# Patient Record
Sex: MALE | Race: BLACK OR AFRICAN AMERICAN | NOT HISPANIC OR LATINO | URBAN - METROPOLITAN AREA
[De-identification: names, ages, dates, MRNs, and addresses within clinical notes are randomized per-mention and may not be internally consistent; named-entity substitution may affect disease eponyms.]

---

## 2017-01-04 ENCOUNTER — OUTPATIENT (OUTPATIENT)
Dept: OUTPATIENT SERVICES | Facility: HOSPITAL | Age: 82
LOS: 1 days | Discharge: HOME | End: 2017-01-04

## 2017-01-04 ENCOUNTER — APPOINTMENT (OUTPATIENT)
Dept: HEMATOLOGY ONCOLOGY | Facility: CLINIC | Age: 82
End: 2017-01-04

## 2017-01-04 VITALS
DIASTOLIC BLOOD PRESSURE: 75 MMHG | RESPIRATION RATE: 12 BRPM | TEMPERATURE: 97 F | HEART RATE: 57 BPM | SYSTOLIC BLOOD PRESSURE: 146 MMHG | WEIGHT: 158 LBS

## 2017-01-04 DIAGNOSIS — R39.9 UNSPECIFIED SYMPTOMS AND SIGNS INVOLVING THE GENITOURINARY SYSTEM: ICD-10-CM

## 2017-01-04 RX ORDER — TAMSULOSIN HYDROCHLORIDE 0.4 MG/1
0.4 CAPSULE ORAL
Qty: 90 | Refills: 3 | Status: ACTIVE | COMMUNITY
Start: 2017-01-04 | End: 1900-01-01

## 2017-01-05 LAB — PSA FLD-MCNC: 1.73 NG/ML

## 2017-06-27 DIAGNOSIS — C61 MALIGNANT NEOPLASM OF PROSTATE: ICD-10-CM

## 2017-07-12 ENCOUNTER — OUTPATIENT (OUTPATIENT)
Dept: OUTPATIENT SERVICES | Facility: HOSPITAL | Age: 82
LOS: 1 days | Discharge: HOME | End: 2017-07-12

## 2017-07-12 ENCOUNTER — APPOINTMENT (OUTPATIENT)
Dept: HEMATOLOGY ONCOLOGY | Facility: CLINIC | Age: 82
End: 2017-07-12

## 2017-07-12 VITALS
DIASTOLIC BLOOD PRESSURE: 69 MMHG | RESPIRATION RATE: 12 BRPM | SYSTOLIC BLOOD PRESSURE: 119 MMHG | BODY MASS INDEX: 20.3 KG/M2 | WEIGHT: 145 LBS | HEIGHT: 71 IN | TEMPERATURE: 97.6 F

## 2017-07-17 DIAGNOSIS — C61 MALIGNANT NEOPLASM OF PROSTATE: ICD-10-CM

## 2017-07-17 LAB — PSA FREE MFR FLD: 2.6 NG/ML

## 2018-01-10 ENCOUNTER — APPOINTMENT (OUTPATIENT)
Dept: HEMATOLOGY ONCOLOGY | Facility: CLINIC | Age: 83
End: 2018-01-10
Payer: MEDICARE

## 2018-01-10 ENCOUNTER — OUTPATIENT (OUTPATIENT)
Dept: OUTPATIENT SERVICES | Facility: HOSPITAL | Age: 83
LOS: 1 days | Discharge: HOME | End: 2018-01-10

## 2018-01-10 VITALS
HEIGHT: 71 IN | RESPIRATION RATE: 12 BRPM | HEART RATE: 60 BPM | SYSTOLIC BLOOD PRESSURE: 117 MMHG | TEMPERATURE: 96.7 F | BODY MASS INDEX: 21 KG/M2 | WEIGHT: 150 LBS | DIASTOLIC BLOOD PRESSURE: 65 MMHG

## 2018-01-10 PROCEDURE — 99213 OFFICE O/P EST LOW 20 MIN: CPT

## 2018-01-11 DIAGNOSIS — C61 MALIGNANT NEOPLASM OF PROSTATE: ICD-10-CM

## 2018-01-11 LAB — PSA FREE MFR FLD: 2.96 NG/ML

## 2018-02-28 ENCOUNTER — MEDICATION RENEWAL (OUTPATIENT)
Age: 83
End: 2018-02-28

## 2018-05-16 ENCOUNTER — OUTPATIENT (OUTPATIENT)
Dept: OUTPATIENT SERVICES | Facility: HOSPITAL | Age: 83
LOS: 1 days | Discharge: HOME | End: 2018-05-16

## 2018-05-16 ENCOUNTER — APPOINTMENT (OUTPATIENT)
Dept: HEMATOLOGY ONCOLOGY | Facility: CLINIC | Age: 83
End: 2018-05-16
Payer: MEDICARE

## 2018-05-16 VITALS
DIASTOLIC BLOOD PRESSURE: 70 MMHG | RESPIRATION RATE: 12 BRPM | BODY MASS INDEX: 21.84 KG/M2 | TEMPERATURE: 96 F | HEART RATE: 55 BPM | SYSTOLIC BLOOD PRESSURE: 144 MMHG | HEIGHT: 71 IN | WEIGHT: 156 LBS

## 2018-05-17 LAB — PSA SERPL-MCNC: 4.5 NG/ML

## 2018-05-18 DIAGNOSIS — C61 MALIGNANT NEOPLASM OF PROSTATE: ICD-10-CM

## 2018-06-04 ENCOUNTER — LABORATORY RESULT (OUTPATIENT)
Age: 83
End: 2018-06-04

## 2018-06-04 ENCOUNTER — OUTPATIENT (OUTPATIENT)
Dept: OUTPATIENT SERVICES | Facility: HOSPITAL | Age: 83
LOS: 1 days | Discharge: HOME | End: 2018-06-04

## 2018-06-04 ENCOUNTER — APPOINTMENT (OUTPATIENT)
Dept: HEMATOLOGY ONCOLOGY | Facility: CLINIC | Age: 83
End: 2018-06-04

## 2018-06-04 DIAGNOSIS — Z87.81 PRESENCE OF OTHER BONE AND TENDON IMPLANTS: ICD-10-CM

## 2018-06-04 DIAGNOSIS — Z86.79 PERSONAL HISTORY OF OTHER DISEASES OF THE CIRCULATORY SYSTEM: ICD-10-CM

## 2018-06-04 DIAGNOSIS — Z96.7 PRESENCE OF OTHER BONE AND TENDON IMPLANTS: ICD-10-CM

## 2018-06-04 DIAGNOSIS — Z87.438 PERSONAL HISTORY OF OTHER DISEASES OF MALE GENITAL ORGANS: ICD-10-CM

## 2018-06-04 LAB
HCT VFR BLD CALC: 36.1 %
HGB BLD-MCNC: 12 G/DL
MCHC RBC-ENTMCNC: 29.8 PG
MCHC RBC-ENTMCNC: 33.2 G/DL
MCV RBC AUTO: 89.6 FL
PLATELET # BLD AUTO: 299 K/UL
PMV BLD: 9.1 FL
RBC # BLD: 4.03 M/UL
RBC # FLD: 13.9 %
WBC # FLD AUTO: 5.15 K/UL

## 2018-06-04 RX ORDER — ATORVASTATIN CALCIUM 20 MG/1
20 TABLET, FILM COATED ORAL
Refills: 0 | Status: ACTIVE | COMMUNITY

## 2018-06-04 RX ORDER — CHROMIUM 200 MCG
1000 TABLET ORAL
Refills: 0 | Status: ACTIVE | COMMUNITY

## 2018-06-04 RX ORDER — HYDROCHLOROTHIAZIDE 12.5 MG/1
12.5 CAPSULE ORAL
Refills: 0 | Status: ACTIVE | COMMUNITY

## 2018-06-04 RX ORDER — AMLODIPINE BESYLATE 10 MG/1
10 TABLET ORAL
Refills: 0 | Status: ACTIVE | COMMUNITY

## 2018-06-06 LAB
ALBUMIN SERPL ELPH-MCNC: 4.4 G/DL
ALP BLD-CCNC: 59 U/L
ALT SERPL-CCNC: 17 U/L
ANION GAP SERPL CALC-SCNC: 18 MMOL/L
AST SERPL-CCNC: 27 U/L
BILIRUB SERPL-MCNC: 0.3 MG/DL
BUN SERPL-MCNC: 22 MG/DL
CALCIUM SERPL-MCNC: 10 MG/DL
CHLORIDE SERPL-SCNC: 102 MMOL/L
CO2 SERPL-SCNC: 25 MMOL/L
CREAT SERPL-MCNC: 1.2 MG/DL
GLUCOSE SERPL-MCNC: 107 MG/DL
POTASSIUM SERPL-SCNC: 3.6 MMOL/L
PROT SERPL-MCNC: 7.5 G/DL
SODIUM SERPL-SCNC: 145 MMOL/L

## 2018-06-06 NOTE — REVIEW OF SYSTEMS
[Joint Pain] : joint pain [Muscle Pain] : muscle pain [Negative] : Neurological [Fatigue] : no fatigue [FreeTextEntry3] : Hearing loss

## 2018-06-06 NOTE — PHYSICAL EXAM
[Restricted in physically strenuous activity but ambulatory and able to carry out work of a light or sedentary nature] : Status 1- Restricted in physically strenuous activity but ambulatory and able to carry out work of a light or sedentary nature, e.g., light house work, office work [Thin] : thin [Normal] : grossly intact [de-identified] : Hearing aids

## 2018-06-06 NOTE — HISTORY OF PRESENT ILLNESS
[de-identified] : CC: I have pain in my hips\par \par This is a 86 year old male who presents for an initial evaluation for prostate cancer at the request of Dr. Barnes. He was diagnosed with prostate cancer in 2011 and was treated with external beam radiation and brachytherapy . In 1/2013  he had biochemical recurrence with PSA  as high as 111 and he has been on   Trelstar  since than.  Imaging at that time just showed pulmonary nodules. They do not know the [place] score.\par \par CT chest 1/2013: 5.5 mm pulmonary nodule new in medial right, stable 4.8 mm nodule anterior right lobe, 10 mm groudnglass nodule,.other benign noduels seen \par CT abdo/pelvis: liver and renal cysts.\par Bone scan 2/2013: No bony mets\par \par His PSA has been increasing and he is here to see me.\par \par 05- is most recent 4.5 1.43 is from  8/2015\par ?? 4.50 ? 2.96 ? 2.60 ? 1.730 ? 1.59 ? 1.20 ? 1.43 \par  HIs PSA doubiling time is PSA Doubling Time\par \par MSK base on PSA \par Doubling Time 19.4 months \par [place] Log (PSA) 0.0 \par Velocity 0.1 ng/mL/mo \par \par Years\par \par Doubling Time 1.6 years \par [place] Log (PSA) 0.4 \par Velocity 1.0 ng/mL/yr \par \par \par His main complaints are pain in his hips and discomfort in thighs on ambulation. He remains active

## 2018-06-06 NOTE — REASON FOR VISIT
[Initial Consultation] : an initial consultation [Spouse] : spouse [FreeTextEntry2] : Prostate Cancer

## 2018-06-06 NOTE — ASSESSMENT
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 If he has mets will start him on abiraterone with prednisone\par -will check CBC, CMP as well\par -he is on D instructed him to start Calcium\par -if no bony mets on scans will get DEXA\par -RTC in 3 weeks \par -information on  apalutimide and abiraterone provided

## 2018-06-06 NOTE — CONSULT LETTER
[Dear  ___] : Dear  [unfilled], [Consult Letter:] : I had the pleasure of evaluating your patient, [unfilled]. [Please see my note below.] : Please see my note below. [Sincerely,] : Sincerely, [FreeTextEntry3] : Nathaniel

## 2018-06-07 DIAGNOSIS — C61 MALIGNANT NEOPLASM OF PROSTATE: ICD-10-CM

## 2018-06-10 ENCOUNTER — FORM ENCOUNTER (OUTPATIENT)
Age: 83
End: 2018-06-10

## 2018-06-11 ENCOUNTER — OUTPATIENT (OUTPATIENT)
Dept: OUTPATIENT SERVICES | Facility: HOSPITAL | Age: 83
LOS: 1 days | Discharge: HOME | End: 2018-06-11

## 2018-06-11 DIAGNOSIS — C61 MALIGNANT NEOPLASM OF PROSTATE: ICD-10-CM

## 2018-06-25 ENCOUNTER — FORM ENCOUNTER (OUTPATIENT)
Age: 83
End: 2018-06-25

## 2018-06-26 ENCOUNTER — OUTPATIENT (OUTPATIENT)
Dept: OUTPATIENT SERVICES | Facility: HOSPITAL | Age: 83
LOS: 1 days | Discharge: HOME | End: 2018-06-26

## 2018-06-26 DIAGNOSIS — C61 MALIGNANT NEOPLASM OF PROSTATE: ICD-10-CM

## 2018-07-10 ENCOUNTER — APPOINTMENT (OUTPATIENT)
Dept: HEMATOLOGY ONCOLOGY | Facility: CLINIC | Age: 83
End: 2018-07-10

## 2018-10-08 ENCOUNTER — APPOINTMENT (OUTPATIENT)
Dept: UROLOGY | Facility: CLINIC | Age: 83
End: 2018-10-08
Payer: MEDICARE

## 2018-10-08 VITALS
WEIGHT: 156 LBS | HEART RATE: 65 BPM | DIASTOLIC BLOOD PRESSURE: 62 MMHG | SYSTOLIC BLOOD PRESSURE: 97 MMHG | HEIGHT: 71 IN | BODY MASS INDEX: 21.84 KG/M2

## 2018-10-08 RX ORDER — CHROMIUM 200 MCG
1000 TABLET ORAL
Refills: 0 | Status: ACTIVE | COMMUNITY

## 2018-10-17 ENCOUNTER — APPOINTMENT (OUTPATIENT)
Dept: HEMATOLOGY ONCOLOGY | Facility: CLINIC | Age: 83
End: 2018-10-17

## 2018-10-17 ENCOUNTER — LABORATORY RESULT (OUTPATIENT)
Age: 83
End: 2018-10-17

## 2018-10-17 ENCOUNTER — OUTPATIENT (OUTPATIENT)
Dept: OUTPATIENT SERVICES | Facility: HOSPITAL | Age: 83
LOS: 1 days | Discharge: HOME | End: 2018-10-17

## 2018-10-17 VITALS
DIASTOLIC BLOOD PRESSURE: 63 MMHG | RESPIRATION RATE: 12 BRPM | SYSTOLIC BLOOD PRESSURE: 146 MMHG | TEMPERATURE: 96.6 F | BODY MASS INDEX: 21.84 KG/M2 | WEIGHT: 156 LBS | HEIGHT: 71 IN | HEART RATE: 64 BPM

## 2018-10-17 DIAGNOSIS — C61 MALIGNANT NEOPLASM OF PROSTATE: ICD-10-CM

## 2018-10-17 RX ORDER — PNV NO.95/FERROUS FUM/FOLIC AC 28MG-0.8MG
500-400 TABLET ORAL
Qty: 120 | Refills: 3 | Status: ACTIVE | COMMUNITY
Start: 2018-10-17 | End: 1900-01-01

## 2018-10-17 RX ORDER — PREDNISONE 5 MG/1
5 TABLET ORAL DAILY
Qty: 90 | Refills: 3 | Status: ACTIVE | COMMUNITY
Start: 2018-10-17 | End: 1900-01-01

## 2018-10-17 RX ORDER — ABIRATERONE ACETATE 500 MG/1
500 TABLET, FILM COATED ORAL DAILY
Qty: 60 | Refills: 6 | Status: ACTIVE | COMMUNITY
Start: 2018-10-17 | End: 1900-01-01

## 2018-10-18 LAB
25(OH)D3 SERPL-MCNC: 76 NG/ML
ALBUMIN SERPL ELPH-MCNC: 4.7 G/DL
ALP BLD-CCNC: 61 U/L
ALT SERPL-CCNC: 20 U/L
ANION GAP SERPL CALC-SCNC: 16 MMOL/L
AST SERPL-CCNC: 26 U/L
BILIRUB SERPL-MCNC: 0.3 MG/DL
BUN SERPL-MCNC: 14 MG/DL
CALCIUM SERPL-MCNC: 9.5 MG/DL
CALCIUM SERPL-MCNC: 9.8 MG/DL
CHLORIDE SERPL-SCNC: 103 MMOL/L
CO2 SERPL-SCNC: 26 MMOL/L
CREAT SERPL-MCNC: 1.2 MG/DL
GLUCOSE SERPL-MCNC: 99 MG/DL
HCT VFR BLD CALC: 36 %
HGB BLD-MCNC: 11.8 G/DL
MCHC RBC-ENTMCNC: 30.2 PG
MCHC RBC-ENTMCNC: 32.8 G/DL
MCV RBC AUTO: 92.1 FL
PARATHYROID HORMONE INTACT: 33 PG/ML
PLATELET # BLD AUTO: 262 K/UL
PMV BLD: 8.7 FL
POTASSIUM SERPL-SCNC: 4.4 MMOL/L
PROT SERPL-MCNC: 7.5 G/DL
PSA SERPL-MCNC: 11.72 NG/ML
RBC # BLD: 3.91 M/UL
RBC # FLD: 15 %
SODIUM SERPL-SCNC: 145 MMOL/L
TESTOST SERPL-MCNC: 22.2 NG/DL
WBC # FLD AUTO: 5.69 K/UL

## 2018-10-19 NOTE — HISTORY OF PRESENT ILLNESS
[de-identified] : CC: I have pain in my hips\par \par This is a 86 year old male who presents for an initial evaluation for prostate cancer at the request of Dr. Barnes. He was diagnosed with prostate cancer in 2011 and was treated with external beam radiation and brachytherapy . In 1/2013  he had biochemical recurrence with PSA  as high as 111 and he has been on   Trelstar  since than.  Imaging at that time just showed pulmonary nodules. They do not know the [place] score.\par \par CT chest 1/2013: 5.5 mm pulmonary nodule new in medial right, stable 4.8 mm nodule anterior right lobe, 10 mm groudnglass nodule,.other benign noduels seen \par CT abdo/pelvis: liver and renal cysts.\par Bone scan 2/2013: No bony mets\par \par His PSA has been increasing and he is here to see me.\par \par 05- is most recent 4.5 1.43 is from  8/2015\par ?? 4.50 ? 2.96 ? 2.60 ? 1.730 ? 1.59 ? 1.20 ? 1.43 \par  HIs PSA doubiling time is PSA Doubling Time\par \par MSK base on PSA \par Doubling Time 19.4 months \par [place] Log (PSA) 0.0 \par Velocity 0.1 ng/mL/mo \par \par Years\par \par Doubling Time 1.6 years \par [place] Log (PSA) 0.4 \par Velocity 1.0 ng/mL/yr \par \par \par His main complaints are pain in his hips and discomfort in thighs on ambulation. He remains active  [de-identified] : 10/17/18\par He returns for follow up, several months later. He imaging in 6/2018 that showed Bone scan: 2 definite new bony abnormalities (right  side of T12 and left fifth posterior rib) and probably a new bony  abnormality (C5 or C6) suspicious for metastatic bone disease by pattern  of distribution and change over time. CT chest: Since November 2006.   Multiple blastic lesions, T2, T12 and left fifth rib, consistent with  osseous metastases (positive on recent bone scan).  Additional 5 mm sclerotic lesion, T6 vertebral body.   New approximately 11 mm groundglass nodule, right apex (series 3/87) Follow-up CT scan in 3 months time to assess stability suggested.  Stable partially calcified bilateral hilar and lower right paratracheal  lymph nodes., Likely result of previous granulomatous infection. CTa bdomen: New blastic metastatic lesion in the T12 vertebral body as identified on  nuclear medicine bone scan dated June 11, 2018  No evidence of abdominal or pelvic adenopathy.

## 2018-10-19 NOTE — ASSESSMENT
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 I could offer him Prov[facility] but they need to travel  so will start him on abiraterone with prednisone 5 mg daily they are aware of side effect we discussed last time and we did again today and litirature provided\par -on DT wit Dr Barnes \par -will check CBC, CMP as PSA \par -he is on D and Calcium\par -he has no teeth and given bone mets will start Xgeva, risk  discussed including osteonecrosis of jaw and wishes to start with next visit , will check PTH and Vitamin D \par -RTC in 4 weeks

## 2018-10-19 NOTE — REVIEW OF SYSTEMS
[Joint Pain] : joint pain [Muscle Pain] : muscle pain [Fatigue] : no fatigue [Negative] : Psychiatric [FreeTextEntry3] : Hearing loss

## 2018-10-19 NOTE — PHYSICAL EXAM
[Restricted in physically strenuous activity but ambulatory and able to carry out work of a light or sedentary nature] : Status 1- Restricted in physically strenuous activity but ambulatory and able to carry out work of a light or sedentary nature, e.g., light house work, office work [Thin] : thin [Normal] : grossly intact [de-identified] : Hearing aids

## 2018-10-22 DIAGNOSIS — C61 MALIGNANT NEOPLASM OF PROSTATE: ICD-10-CM

## 2018-11-14 ENCOUNTER — APPOINTMENT (OUTPATIENT)
Dept: HEMATOLOGY ONCOLOGY | Facility: CLINIC | Age: 83
End: 2018-11-14

## 2018-11-14 ENCOUNTER — APPOINTMENT (OUTPATIENT)
Dept: INFUSION THERAPY | Facility: CLINIC | Age: 83
End: 2018-11-14

## 2018-11-20 ENCOUNTER — APPOINTMENT (OUTPATIENT)
Dept: HEMATOLOGY ONCOLOGY | Facility: CLINIC | Age: 83
End: 2018-11-20

## 2019-01-14 ENCOUNTER — APPOINTMENT (OUTPATIENT)
Dept: UROLOGY | Facility: CLINIC | Age: 84
End: 2019-01-14

## 2019-02-05 ENCOUNTER — OUTPATIENT (OUTPATIENT)
Dept: OUTPATIENT SERVICES | Facility: HOSPITAL | Age: 84
LOS: 1 days | Discharge: HOME | End: 2019-02-05

## 2019-02-05 DIAGNOSIS — C61 MALIGNANT NEOPLASM OF PROSTATE: ICD-10-CM

## 2019-04-01 ENCOUNTER — APPOINTMENT (OUTPATIENT)
Dept: UROLOGY | Facility: CLINIC | Age: 84
End: 2019-04-01
Payer: MEDICARE

## 2019-04-01 VITALS — BODY MASS INDEX: 21.84 KG/M2 | WEIGHT: 156 LBS | HEIGHT: 71 IN

## 2019-04-01 DIAGNOSIS — C61 MALIGNANT NEOPLASM OF PROSTATE: ICD-10-CM

## 2019-04-03 RX ORDER — TAMSULOSIN HYDROCHLORIDE 0.4 MG/1
0.4 CAPSULE ORAL
Qty: 90 | Refills: 3 | Status: ACTIVE | COMMUNITY
Start: 2018-02-28 | End: 1900-01-01

## 2019-07-01 ENCOUNTER — APPOINTMENT (OUTPATIENT)
Dept: UROLOGY | Facility: CLINIC | Age: 84
End: 2019-07-01

## 2019-07-26 ENCOUNTER — INPATIENT (INPATIENT)
Facility: HOSPITAL | Age: 84
LOS: 0 days | Discharge: LEFT BEFORE DISCHARGE | End: 2019-07-26
Attending: INTERNAL MEDICINE | Admitting: INTERNAL MEDICINE
Payer: MEDICARE

## 2019-07-26 VITALS
SYSTOLIC BLOOD PRESSURE: 139 MMHG | RESPIRATION RATE: 18 BRPM | OXYGEN SATURATION: 97 % | TEMPERATURE: 97 F | DIASTOLIC BLOOD PRESSURE: 68 MMHG | HEART RATE: 86 BPM

## 2019-07-26 VITALS
RESPIRATION RATE: 18 BRPM | TEMPERATURE: 97 F | OXYGEN SATURATION: 100 % | DIASTOLIC BLOOD PRESSURE: 51 MMHG | HEART RATE: 57 BPM | SYSTOLIC BLOOD PRESSURE: 94 MMHG

## 2019-07-26 LAB
ALBUMIN SERPL ELPH-MCNC: 3.4 G/DL — LOW (ref 3.5–5.2)
ALP SERPL-CCNC: 79 U/L — SIGNIFICANT CHANGE UP (ref 30–115)
ALT FLD-CCNC: 31 U/L — SIGNIFICANT CHANGE UP (ref 0–41)
ANION GAP SERPL CALC-SCNC: 12 MMOL/L — SIGNIFICANT CHANGE UP (ref 7–14)
APPEARANCE UR: ABNORMAL
APTT BLD: 41.9 SEC — HIGH (ref 27–39.2)
AST SERPL-CCNC: 47 U/L — HIGH (ref 0–41)
BACTERIA # UR AUTO: ABNORMAL
BASE EXCESS BLDV CALC-SCNC: 15.1 MMOL/L — HIGH (ref -2–2)
BASOPHILS # BLD AUTO: 0.03 K/UL — SIGNIFICANT CHANGE UP (ref 0–0.2)
BASOPHILS NFR BLD AUTO: 0.4 % — SIGNIFICANT CHANGE UP (ref 0–1)
BILIRUB SERPL-MCNC: 0.5 MG/DL — SIGNIFICANT CHANGE UP (ref 0.2–1.2)
BILIRUB UR-MCNC: NEGATIVE — SIGNIFICANT CHANGE UP
BUN SERPL-MCNC: 14 MG/DL — SIGNIFICANT CHANGE UP (ref 10–20)
CA-I SERPL-SCNC: 1.12 MMOL/L — SIGNIFICANT CHANGE UP (ref 1.12–1.3)
CALCIUM SERPL-MCNC: 8.9 MG/DL — SIGNIFICANT CHANGE UP (ref 8.5–10.1)
CHLORIDE SERPL-SCNC: 101 MMOL/L — SIGNIFICANT CHANGE UP (ref 98–110)
CO2 SERPL-SCNC: 31 MMOL/L — SIGNIFICANT CHANGE UP (ref 17–32)
COLOR SPEC: YELLOW — SIGNIFICANT CHANGE UP
CREAT SERPL-MCNC: 1.1 MG/DL — SIGNIFICANT CHANGE UP (ref 0.7–1.5)
DIFF PNL FLD: SIGNIFICANT CHANGE UP
EOSINOPHIL # BLD AUTO: 0.1 K/UL — SIGNIFICANT CHANGE UP (ref 0–0.7)
EOSINOPHIL NFR BLD AUTO: 1.2 % — SIGNIFICANT CHANGE UP (ref 0–8)
EPI CELLS # UR: SIGNIFICANT CHANGE UP /HPF (ref 0–5)
GAS PNL BLDV: 141 MMOL/L — SIGNIFICANT CHANGE UP (ref 136–145)
GAS PNL BLDV: SIGNIFICANT CHANGE UP
GLUCOSE SERPL-MCNC: 96 MG/DL — SIGNIFICANT CHANGE UP (ref 70–99)
GLUCOSE UR QL: NEGATIVE — SIGNIFICANT CHANGE UP
HCO3 BLDV-SCNC: 40 MMOL/L — HIGH (ref 22–29)
HCT VFR BLD CALC: 29.8 % — LOW (ref 42–52)
HCT VFR BLDA CALC: 33.6 % — LOW (ref 34–44)
HGB BLD CALC-MCNC: 11 G/DL — LOW (ref 14–18)
HGB BLD-MCNC: 10.4 G/DL — LOW (ref 14–18)
IMM GRANULOCYTES NFR BLD AUTO: 0.2 % — SIGNIFICANT CHANGE UP (ref 0.1–0.3)
INR BLD: 1.06 RATIO — SIGNIFICANT CHANGE UP (ref 0.65–1.3)
KETONES UR-MCNC: ABNORMAL
LACTATE BLDV-MCNC: 1.3 MMOL/L — SIGNIFICANT CHANGE UP (ref 0.5–1.6)
LEUKOCYTE ESTERASE UR-ACNC: NEGATIVE — SIGNIFICANT CHANGE UP
LYMPHOCYTES # BLD AUTO: 2.22 K/UL — SIGNIFICANT CHANGE UP (ref 1.2–3.4)
LYMPHOCYTES # BLD AUTO: 27.4 % — SIGNIFICANT CHANGE UP (ref 20.5–51.1)
MAGNESIUM SERPL-MCNC: 2.2 MG/DL — SIGNIFICANT CHANGE UP (ref 1.8–2.4)
MCHC RBC-ENTMCNC: 30.6 PG — SIGNIFICANT CHANGE UP (ref 27–31)
MCHC RBC-ENTMCNC: 34.9 G/DL — SIGNIFICANT CHANGE UP (ref 32–37)
MCV RBC AUTO: 87.6 FL — SIGNIFICANT CHANGE UP (ref 80–94)
MONOCYTES # BLD AUTO: 0.75 K/UL — HIGH (ref 0.1–0.6)
MONOCYTES NFR BLD AUTO: 9.3 % — SIGNIFICANT CHANGE UP (ref 1.7–9.3)
NEUTROPHILS # BLD AUTO: 4.98 K/UL — SIGNIFICANT CHANGE UP (ref 1.4–6.5)
NEUTROPHILS NFR BLD AUTO: 61.5 % — SIGNIFICANT CHANGE UP (ref 42.2–75.2)
NITRITE UR-MCNC: NEGATIVE — SIGNIFICANT CHANGE UP
NRBC # BLD: 0 /100 WBCS — SIGNIFICANT CHANGE UP (ref 0–0)
PCO2 BLDV: 48 MMHG — SIGNIFICANT CHANGE UP (ref 41–51)
PH BLDV: 7.53 — HIGH (ref 7.26–7.43)
PH UR: 7 — SIGNIFICANT CHANGE UP (ref 5–8)
PLATELET # BLD AUTO: 290 K/UL — SIGNIFICANT CHANGE UP (ref 130–400)
PO2 BLDV: 28 MMHG — SIGNIFICANT CHANGE UP (ref 20–40)
POTASSIUM BLDV-SCNC: 3 MMOL/L — LOW (ref 3.3–5.6)
POTASSIUM SERPL-MCNC: 2.5 MMOL/L — CRITICAL LOW (ref 3.5–5)
POTASSIUM SERPL-SCNC: 2.5 MMOL/L — CRITICAL LOW (ref 3.5–5)
PROT SERPL-MCNC: 6.5 G/DL — SIGNIFICANT CHANGE UP (ref 6–8)
PROT UR-MCNC: ABNORMAL
PROTHROM AB SERPL-ACNC: 12.2 SEC — SIGNIFICANT CHANGE UP (ref 9.95–12.87)
RBC # BLD: 3.4 M/UL — LOW (ref 4.7–6.1)
RBC # FLD: 14.5 % — SIGNIFICANT CHANGE UP (ref 11.5–14.5)
RBC CASTS # UR COMP ASSIST: SIGNIFICANT CHANGE UP /HPF (ref 0–4)
SAO2 % BLDV: 53 % — SIGNIFICANT CHANGE UP
SODIUM SERPL-SCNC: 144 MMOL/L — SIGNIFICANT CHANGE UP (ref 135–146)
SP GR SPEC: 1.02 — SIGNIFICANT CHANGE UP (ref 1.01–1.02)
UROBILINOGEN FLD QL: ABNORMAL
WBC # BLD: 8.1 K/UL — SIGNIFICANT CHANGE UP (ref 4.8–10.8)
WBC # FLD AUTO: 8.1 K/UL — SIGNIFICANT CHANGE UP (ref 4.8–10.8)
WBC UR QL: SIGNIFICANT CHANGE UP /HPF (ref 0–5)

## 2019-07-26 PROCEDURE — 99285 EMERGENCY DEPT VISIT HI MDM: CPT

## 2019-07-26 PROCEDURE — 93010 ELECTROCARDIOGRAM REPORT: CPT

## 2019-07-26 PROCEDURE — 71045 X-RAY EXAM CHEST 1 VIEW: CPT | Mod: 26

## 2019-07-26 RX ORDER — TAMSULOSIN HYDROCHLORIDE 0.4 MG/1
0.4 CAPSULE ORAL AT BEDTIME
Refills: 0 | Status: DISCONTINUED | OUTPATIENT
Start: 2019-07-26 | End: 2019-07-26

## 2019-07-26 RX ORDER — ATORVASTATIN CALCIUM 80 MG/1
1 TABLET, FILM COATED ORAL
Qty: 0 | Refills: 0 | DISCHARGE

## 2019-07-26 RX ORDER — ATORVASTATIN CALCIUM 80 MG/1
20 TABLET, FILM COATED ORAL AT BEDTIME
Refills: 0 | Status: DISCONTINUED | OUTPATIENT
Start: 2019-07-26 | End: 2019-07-26

## 2019-07-26 RX ORDER — POTASSIUM CHLORIDE 20 MEQ
40 PACKET (EA) ORAL
Qty: 40 | Refills: 0
Start: 2019-07-26 | End: 2019-07-30

## 2019-07-26 RX ORDER — TAMSULOSIN HYDROCHLORIDE 0.4 MG/1
1 CAPSULE ORAL
Qty: 0 | Refills: 0 | DISCHARGE

## 2019-07-26 RX ORDER — POTASSIUM CHLORIDE 20 MEQ
20 PACKET (EA) ORAL ONCE
Refills: 0 | Status: COMPLETED | OUTPATIENT
Start: 2019-07-26 | End: 2019-07-26

## 2019-07-26 RX ORDER — AMLODIPINE BESYLATE 2.5 MG/1
1 TABLET ORAL
Qty: 0 | Refills: 0 | DISCHARGE

## 2019-07-26 RX ORDER — POTASSIUM CHLORIDE 20 MEQ
40 PACKET (EA) ORAL ONCE
Refills: 0 | Status: COMPLETED | OUTPATIENT
Start: 2019-07-26 | End: 2019-07-26

## 2019-07-26 RX ADMIN — Medication 20 MILLIEQUIVALENT(S): at 15:29

## 2019-07-26 RX ADMIN — Medication 40 MILLIEQUIVALENT(S): at 17:17

## 2019-07-26 RX ADMIN — Medication 50 MILLIEQUIVALENT(S): at 15:29

## 2019-07-26 NOTE — ED PROVIDER NOTE - PHYSICAL EXAMINATION
VITAL SIGNS: I have reviewed nursing notes and confirm.  CONSTITUTIONAL: Well-developed; well-nourished; in no acute distress.  SKIN: Skin exam is warm and dry, no acute rash.  HEAD: Normocephalic; atraumatic.  EYES: PERRL, EOM intact; conjunctiva and sclera clear. ?exophthalmos  ENT: No nasal discharge; airway clear. TMs clear. dry mm  NECK: Supple; non tender.  CARD: S1, S2 normal; no murmurs, gallops, or rubs. Regular rate and rhythm.  RESP: No wheezes, rales or rhonchi.  ABD: Normal bowel sounds; soft; non-distended; non-tender;  EXT: Normal ROM. No clubbing, cyanosis , +pitting edema. 2+  NEURO: aox3, cn2-12 grossly normal, 5/5 strength all ext, sensation intact,  PSYCH: Cooperative, appropriate.

## 2019-07-26 NOTE — CHART NOTE - NSCHARTNOTEFT_GEN_A_CORE
Before admitting the patient - the patient stated that he wants to leave AMA - patient has capacity and his son is at bedside   he understands the risks of arrythmia and death with the low K that he has and that he needs assistance but he insisted that he wants to leave   he has no active or passive suicidal thoughts Before admitting the patient - the patient stated that he wants to leave AMA - patient has capacity and his son is at bedside   he understands the risks of arrythmia and death with the low K that he has and that he needs assistance but he insisted that he wants to leave   he has no active or passive suicidal thoughts  left a message for Dr Vernon group   he signed an AMA form as well as his son - I sent potassium to his pharmacy and advised urgently to come back if any symptoms and to repeat BMP tomorrow

## 2019-07-26 NOTE — ED ADULT NURSE NOTE - OBJECTIVE STATEMENT
Patient with PMH of hHTN, stage 4 prostate ca (onc at [facility] dr. freddie saxena- on zytiga, HLD, sent in by Dr. Vernon for hypokalemia.  as per son, pt has been unable to care for himself since his wife was hospitalized about a month ago.  pt was seen by Dr. Saxena 7/24 and labs showed k of 2.1.  pt was prescribed kdur and son thinks pt may have taken a pill.  yesterday, pt was at dr. vernon's office and labs drawn.  son was called today to say that his k is still low (doesn't know value), and to go to ED.  as per son, pt lives alone now and the house appeared unkempt.  pt only complains of weakness x 1 month and b/l leg swelling.  pt appears to only be on HCTZ for diuretic

## 2019-07-26 NOTE — ED PROVIDER NOTE - NS ED ROS FT
Review of Systems:  	•	CONSTITUTIONAL - no fever, no diaphoresis, no weight change, weakness  	•	SKIN - no rash  	•	HEMATOLOGIC - no bleeding, no bruising  	•	EYES - no eye pain, no blurred vision  	•	ENT - no change in hearing, no pain  	•	RESPIRATORY - no shortness of breath, no cough  	•	CARDIAC - no chest pain, no palpitations  	•	GI - no abd pain, no nausea, no vomiting, no diarrhea, no constipation, no bleeding  	•	 - no dysuria, no hematuria, no flank pain, no urinary retention  	•	MUSCULOSKELETAL - no joint paint, no redness, +leg swelling  	•	NEUROLOGIC - no focal weakness, no headache, no paresthesias, no dizziness  All other systems negative, unless specified in HPI

## 2019-07-26 NOTE — ED PROVIDER NOTE - CLINICAL SUMMARY MEDICAL DECISION MAKING FREE TEXT BOX
Pt with hypokalemia, appears difficulty living at home by himself since wife , son concerned about his safety at home, will admit to tele

## 2019-07-26 NOTE — ED PROVIDER NOTE - OBJECTIVE STATEMENT
86 yo m with pmh of htn, stage 4 prostate ca (onc at [facility] dr. freddie pal- on zytiga, and "injections"), hld, sent in by dr. negrete for hypokalemia.  as per son, pt has been unable to care for himself since his wife was hospitalized about a month ago.  pt was seen by dr. pal 7/24 and labs showed k of 2.1.  pt was prescribed kdur (?mg) and son thinks pt may have taken a pill.  yesterday, pt was at dr. negrete's office and labs drawn.  son was called today to say that his k is still low (doesn't know value), and to go to ED.  as per son, pt lives alone now and the house appeared unkempt.  pt only complains of weakness x 1 month and b/l leg swelling.  pt appears to only be on HCTZ for diuretic

## 2019-08-01 DIAGNOSIS — I10 ESSENTIAL (PRIMARY) HYPERTENSION: ICD-10-CM

## 2019-08-01 DIAGNOSIS — C61 MALIGNANT NEOPLASM OF PROSTATE: ICD-10-CM

## 2019-08-01 DIAGNOSIS — E78.5 HYPERLIPIDEMIA, UNSPECIFIED: ICD-10-CM

## 2019-08-01 DIAGNOSIS — E87.6 HYPOKALEMIA: ICD-10-CM

## 2019-09-27 ENCOUNTER — EMERGENCY (EMERGENCY)
Facility: HOSPITAL | Age: 84
LOS: 0 days | Discharge: HOME | End: 2019-09-27
Attending: EMERGENCY MEDICINE | Admitting: EMERGENCY MEDICINE
Payer: MEDICARE

## 2019-09-27 VITALS
HEART RATE: 60 BPM | SYSTOLIC BLOOD PRESSURE: 137 MMHG | OXYGEN SATURATION: 100 % | TEMPERATURE: 97 F | DIASTOLIC BLOOD PRESSURE: 65 MMHG | RESPIRATION RATE: 18 BRPM

## 2019-09-27 DIAGNOSIS — R41.0 DISORIENTATION, UNSPECIFIED: ICD-10-CM

## 2019-09-27 DIAGNOSIS — R79.9 ABNORMAL FINDING OF BLOOD CHEMISTRY, UNSPECIFIED: ICD-10-CM

## 2019-09-27 LAB
ALBUMIN SERPL ELPH-MCNC: 4.5 G/DL — SIGNIFICANT CHANGE UP (ref 3.5–5.2)
ALP SERPL-CCNC: 45 U/L — SIGNIFICANT CHANGE UP (ref 30–115)
ALT FLD-CCNC: 11 U/L — SIGNIFICANT CHANGE UP (ref 0–41)
ANION GAP SERPL CALC-SCNC: 12 MMOL/L — SIGNIFICANT CHANGE UP (ref 7–14)
APPEARANCE UR: CLEAR — SIGNIFICANT CHANGE UP
AST SERPL-CCNC: 22 U/L — SIGNIFICANT CHANGE UP (ref 0–41)
BACTERIA # UR AUTO: NEGATIVE — SIGNIFICANT CHANGE UP
BASE EXCESS BLDV CALC-SCNC: 4.1 MMOL/L — HIGH (ref -2–2)
BASOPHILS # BLD AUTO: 0.03 K/UL — SIGNIFICANT CHANGE UP (ref 0–0.2)
BASOPHILS NFR BLD AUTO: 0.7 % — SIGNIFICANT CHANGE UP (ref 0–1)
BILIRUB SERPL-MCNC: 0.4 MG/DL — SIGNIFICANT CHANGE UP (ref 0.2–1.2)
BILIRUB UR-MCNC: NEGATIVE — SIGNIFICANT CHANGE UP
BUN SERPL-MCNC: 19 MG/DL — SIGNIFICANT CHANGE UP (ref 10–20)
CA-I SERPL-SCNC: 1.24 MMOL/L — SIGNIFICANT CHANGE UP (ref 1.12–1.3)
CALCIUM SERPL-MCNC: 9.8 MG/DL — SIGNIFICANT CHANGE UP (ref 8.5–10.1)
CHLORIDE SERPL-SCNC: 104 MMOL/L — SIGNIFICANT CHANGE UP (ref 98–110)
CO2 SERPL-SCNC: 26 MMOL/L — SIGNIFICANT CHANGE UP (ref 17–32)
COLOR SPEC: SIGNIFICANT CHANGE UP
CREAT SERPL-MCNC: 1.1 MG/DL — SIGNIFICANT CHANGE UP (ref 0.7–1.5)
DIFF PNL FLD: ABNORMAL
EOSINOPHIL # BLD AUTO: 0.04 K/UL — SIGNIFICANT CHANGE UP (ref 0–0.7)
EOSINOPHIL NFR BLD AUTO: 0.9 % — SIGNIFICANT CHANGE UP (ref 0–8)
EPI CELLS # UR: 0 /HPF — SIGNIFICANT CHANGE UP (ref 0–5)
GAS PNL BLDV: 145 MMOL/L — SIGNIFICANT CHANGE UP (ref 136–145)
GAS PNL BLDV: SIGNIFICANT CHANGE UP
GLUCOSE SERPL-MCNC: 96 MG/DL — SIGNIFICANT CHANGE UP (ref 70–99)
GLUCOSE UR QL: NEGATIVE — SIGNIFICANT CHANGE UP
HCO3 BLDV-SCNC: 29 MMOL/L — SIGNIFICANT CHANGE UP (ref 22–29)
HCT VFR BLD CALC: 33.7 % — LOW (ref 42–52)
HCT VFR BLDA CALC: 15.3 % — LOW (ref 34–44)
HGB BLD CALC-MCNC: 5 G/DL — LOW (ref 14–18)
HGB BLD-MCNC: 11.2 G/DL — LOW (ref 14–18)
HYALINE CASTS # UR AUTO: 0 /LPF — SIGNIFICANT CHANGE UP (ref 0–7)
IMM GRANULOCYTES NFR BLD AUTO: 0.2 % — SIGNIFICANT CHANGE UP (ref 0.1–0.3)
KETONES UR-MCNC: NEGATIVE — SIGNIFICANT CHANGE UP
LACTATE BLDV-MCNC: 0.9 MMOL/L — SIGNIFICANT CHANGE UP (ref 0.5–1.6)
LACTATE SERPL-SCNC: 0.9 MMOL/L — SIGNIFICANT CHANGE UP (ref 0.5–2.2)
LEUKOCYTE ESTERASE UR-ACNC: NEGATIVE — SIGNIFICANT CHANGE UP
LYMPHOCYTES # BLD AUTO: 1.79 K/UL — SIGNIFICANT CHANGE UP (ref 1.2–3.4)
LYMPHOCYTES # BLD AUTO: 39.1 % — SIGNIFICANT CHANGE UP (ref 20.5–51.1)
MAGNESIUM SERPL-MCNC: 2.1 MG/DL — SIGNIFICANT CHANGE UP (ref 1.8–2.4)
MCHC RBC-ENTMCNC: 30.3 PG — SIGNIFICANT CHANGE UP (ref 27–31)
MCHC RBC-ENTMCNC: 33.2 G/DL — SIGNIFICANT CHANGE UP (ref 32–37)
MCV RBC AUTO: 91.1 FL — SIGNIFICANT CHANGE UP (ref 80–94)
MONOCYTES # BLD AUTO: 0.43 K/UL — SIGNIFICANT CHANGE UP (ref 0.1–0.6)
MONOCYTES NFR BLD AUTO: 9.4 % — HIGH (ref 1.7–9.3)
NEUTROPHILS # BLD AUTO: 2.28 K/UL — SIGNIFICANT CHANGE UP (ref 1.4–6.5)
NEUTROPHILS NFR BLD AUTO: 49.7 % — SIGNIFICANT CHANGE UP (ref 42.2–75.2)
NITRITE UR-MCNC: NEGATIVE — SIGNIFICANT CHANGE UP
NRBC # BLD: 0 /100 WBCS — SIGNIFICANT CHANGE UP (ref 0–0)
PCO2 BLDV: 49 MMHG — SIGNIFICANT CHANGE UP (ref 41–51)
PH BLDV: 7.39 — SIGNIFICANT CHANGE UP (ref 7.26–7.43)
PH UR: 6.5 — SIGNIFICANT CHANGE UP (ref 5–8)
PHOSPHATE SERPL-MCNC: 3.1 MG/DL — SIGNIFICANT CHANGE UP (ref 2.1–4.9)
PLATELET # BLD AUTO: 256 K/UL — SIGNIFICANT CHANGE UP (ref 130–400)
PO2 BLDV: 20 MMHG — SIGNIFICANT CHANGE UP (ref 20–40)
POTASSIUM BLDV-SCNC: 3.8 MMOL/L — SIGNIFICANT CHANGE UP (ref 3.3–5.6)
POTASSIUM SERPL-MCNC: 4.2 MMOL/L — SIGNIFICANT CHANGE UP (ref 3.5–5)
POTASSIUM SERPL-SCNC: 4.2 MMOL/L — SIGNIFICANT CHANGE UP (ref 3.5–5)
PROT SERPL-MCNC: 7.3 G/DL — SIGNIFICANT CHANGE UP (ref 6–8)
PROT UR-MCNC: ABNORMAL
RBC # BLD: 3.7 M/UL — LOW (ref 4.7–6.1)
RBC # FLD: 14.9 % — HIGH (ref 11.5–14.5)
RBC CASTS # UR COMP ASSIST: 14 /HPF — HIGH (ref 0–4)
SAO2 % BLDV: 30 % — SIGNIFICANT CHANGE UP
SODIUM SERPL-SCNC: 142 MMOL/L — SIGNIFICANT CHANGE UP (ref 135–146)
SP GR SPEC: 1.02 — SIGNIFICANT CHANGE UP (ref 1.01–1.02)
UROBILINOGEN FLD QL: SIGNIFICANT CHANGE UP
WBC # BLD: 4.58 K/UL — LOW (ref 4.8–10.8)
WBC # FLD AUTO: 4.58 K/UL — LOW (ref 4.8–10.8)
WBC UR QL: 0 /HPF — SIGNIFICANT CHANGE UP (ref 0–5)

## 2019-09-27 PROCEDURE — 99284 EMERGENCY DEPT VISIT MOD MDM: CPT

## 2019-09-27 PROCEDURE — 93010 ELECTROCARDIOGRAM REPORT: CPT

## 2019-09-27 PROCEDURE — 71045 X-RAY EXAM CHEST 1 VIEW: CPT | Mod: 26

## 2019-09-27 NOTE — ED PROVIDER NOTE - NS ED ROS FT
Constitutional:  See HPI.   Eyes:  No visual changes, eye pain or discharge.  ENMT:  No hearing changes, pain, discharge or infections. No neck pain or stiffness.  Cardiac:  No chest pain, SOB or edema. No chest pain with exertion.  Respiratory:  No cough or respiratory distress. No hemoptysis.  GI:  No nausea, vomiting, diarrhea, abdominal pain.  :  No dysuria, frequency, hematuria  MS:  No joint pain or back pain.  Neuro:  No LOC. No headache or weakness, confused   Skin:  No skin rash.  Except as in HPI, all other review of systems is negative

## 2019-09-27 NOTE — ED ADULT NURSE NOTE - CHIEF COMPLAINT QUOTE
sent by primary MD for possible low potassium, alert and in no distress. alert and oriented x 3, ambulating

## 2019-09-27 NOTE — ED PROVIDER NOTE - NSFOLLOWUPINSTRUCTIONS_ED_ALL_ED_FT
Altered Mental Status    WHAT YOU NEED TO KNOW:    What is altered mental status? Altered mental status (AMS) is a disruption in how your brain works that causes a change in behavior. This change can happen suddenly or over days. AMS ranges from slight confusion to total disorientation and increased sleepiness to coma.     What causes AMS? AMS can be caused by physical, psychological, and environmental factors. In older adults, AMS may be caused by a combination of these factors. The following are some of the most common causes of AMS:    Diseases or conditions in the body that can affect your brain and nervous system:   Hypoxia (low oxygen levels)       Low or high blood sugar levels, or diabetic ketoacidosis      Heart attack       Dehydration, low or high blood sodium levels       Thyroid or adrenal gland disease       Urinary tract infection or renal failure       Diseases or conditions within your skull:   Seizures       Head traumas, concussions       Brain tumors or strokes       Brain disease, such as encephalopathy      High altitude cerebral edema (brain tissue swelling at high altitude)       Other factors:   Burns      Hypothermia (low body temperature), hyperthermia (high body temperature), or heat stroke       Toxic plants, such as mushrooms      Carbon monoxide       Drug or alcohol withdrawal       Psychiatric problems     What factors increase the risk for AMS?     Older adults may have AMS after changes in their medicines, heart attacks, or hip fractures. Infections, such as urinary tract infections or pneumonia, can also increase the risk for AMS.      A medical history of high blood pressure, heart problems, diabetes, or psychiatric illness increases the risk for AMS.     What are the signs and symptoms of AMS? You, or those close to you, will notice changes in how you think and in your awareness, movements, and routines. Some of the more common signs are:     Lack of concentration or forgetfulness       Slow responses       Hallucinations and changes in sleep patterns       Decreased or increased movement       Agitation or rambling speech       Cannot or will not follow reasonable requests       Cannot be aroused from sleep     How is AMS diagnosed? Your healthcare provider will ask you and your family about your usual mental status. He will want know if the changes happened suddenly or over time. He will want to know about recent events, such as falls or other traumas or illnesses. He may ask witnesses about your behavior. Your healthcare provider will ask about the medicines you take and any problems with substance abuse. He will do a physical exam. You may also need the following tests to learn the cause of your AMS:    A neurological exam tells healthcare providers if you are having problems with your brain or nerves. During the exam, your reflexes will be tested.       Vital signs give healthcare providers information about your current health. Healthcare providers will check your blood pressure, heart rate, breathing rate, and temperature. They will also ask about your pain. They will measure the amount of oxygen in your blood with a device called a pulse oximeter.       Blood tests are done to check the function of your liver, kidneys, and lungs. They will also show if you have an infection or other toxins in your body. Your blood glucose level will also be checked.      A chest x-ray is a picture of your lungs and heart. Healthcare providers use the x-ray to look for signs of infection, such as pneumonia, that could be causing your AMS.      A CT scan is also called a CAT scan. An x-ray machine uses a computer to take pictures of your head. The pictures may show a tumor, swelling, or bleeding on the brain.       A lumbar puncture is also called a spinal tap. During a lumbar puncture, you will need to lie still. Healthcare providers may give you medicine to numb a small area of your back. A needle will be put in and fluid will be removed from around your spinal cord. The fluid will be sent to a lab for tests. The tests check for infection, bleeding around your brain and spinal cord, or other problems.     How is AMS treated? Treatment will depend on the cause of your AMS. Treatments with oxygen and medicines may be needed to improve your symptoms. You may be placed in the hospital if your symptoms are severe.     When should I or someone close to me contact my healthcare provider?     You have sudden changes in behavior.       You are more sleepy or confused than usual.       You have questions or concerns about your condition or care.     When should I or someone close to me seek immediate care?     Your speech is slurred or you are rambling.       You have a seizure.       You are not able to move any part of your body freely.       You cannot be woken up.     CARE AGREEMENT:    You have the right to help plan your care. Learn about your health condition and how it may be treated. Discuss treatment options with your healthcare providers to decide what care you want to receive. You always have the right to refuse treatment.

## 2019-09-27 NOTE — ED PROVIDER NOTE - PHYSICAL EXAMINATION
CONSTITUTIONAL: Sitting comfortably, no acute distress.   SKIN: warm, dry  HEAD: Normocephalic; atraumatic.  EYES: PERRL, EOMI, no conjunctival erythema, + Cataracts  ENT: No nasal discharge; airway clear.  NECK: Supple; non tender.  CARD: S1, S2 normal; no murmurs, gallops, or rubs. Regular rate and rhythm.   RESP: No wheezes, rales or rhonchi.  ABD: soft ntnd  EXT: Normal ROM.  No clubbing, cyanosis or edema.   LYMPH: No acute cervical adenopathy.  NEURO: Alert, orientedx2 (Not to time), grossly unremarkable  PSYCH: Cooperative, appropriate.

## 2019-09-27 NOTE — ED PROVIDER NOTE - CLINICAL SUMMARY MEDICAL DECISION MAKING FREE TEXT BOX
86yo M presents for confusion yesterday, baseline mental status today. Labs/urine, including potassium, WNL. No signs/symptoms of infection, UA negative, CXR w/o consolidation. Vitals stable.   Patient to be discharged from ED. Any available test results were discussed with patient and/or family. Verbal instructions given, including instructions to return to ED immediately for any new, worsening, or concerning symptoms. Patient endorsed understanding. Written discharge instructions additionally given, including follow-up plan.

## 2019-09-27 NOTE — ED PROVIDER NOTE - PATIENT PORTAL LINK FT
You can access the FollowMyHealth Patient Portal offered by [facility] by registering at the following website: [email or web address]. By joining [employer]’s FollowMyHealth portal, you will also be able to view your health information using other applications (apps) compatible with our system.

## 2019-09-27 NOTE — ED PROVIDER NOTE - ATTENDING CONTRIBUTION TO CARE
I personally evaluated the patient. I reviewed the Resident’s or Physician Assistant’s note (as assigned above), and agree with the findings and plan except as documented in my note.    88yo M with PMHX prostate CA, HTN, DLD, p/w AMS/confusion. Here with son. Son states yesterday patient seemed more confused, calling to his late wife, not making sense when he speaks which is unusual for him. Called PMD's office, told to go to ED for eval, concern for hypokalemia as pt had prior episodes of confusion when he becomes hypokalemic, last time in July (signed AMA at the time). Currently in ED, patient is AAOx3, son states currently at baseline and not confused. Denies fever, headache, dizziness, lightheadedness, CP, SOB, cough, nausea, vomiting, diarrhea, abd pain, dysuria, leg swelling.    Vital signs reviewed  GENERAL: Patient nontoxic appearing, NAD  HEAD: NCAT  EYES: Anicteric  ENT: MMM  RESPIRATORY: Normal respiratory effort. CTA B/L. No wheezing, rales, rhonchi  CARDIOVASCULAR: Regular rate and rhythm  ABDOMEN: Soft. Nondistended. Nontender. No guarding or rebound.   MUSCULOSKELETAL/EXTREMITIES: Brisk cap refill. 2+ radial pulses. No leg edema.  SKIN:  Warm and dry  NEURO: AAOx3. GCS 15. Speech clear and coherent. Answering questions appropriately. Face symmetric, no facial droop. Strength 5/5 x4. No gross FND.

## 2019-09-27 NOTE — ED PROVIDER NOTE - OBJECTIVE STATEMENT
88 yo m with pmh of htn, stage 4 prostate cahld, sent in by dr. negrete for hypokalemia, pt yesterday was altered, mentioning his late wife, denies fevers chills/ rash/n/v/d/abdominal pain/urinary symptoms. As per son, this happened last time he was hypokalemic.

## 2019-09-27 NOTE — ED ADULT TRIAGE NOTE - CHIEF COMPLAINT QUOTE
sent by primary MD for possible low potassium, alert and in no distress. sent by primary MD for possible low potassium, alert and in no distress. alert and oriented x 3, ambulating

## 2019-09-28 LAB
CULTURE RESULTS: NO GROWTH — SIGNIFICANT CHANGE UP
SPECIMEN SOURCE: SIGNIFICANT CHANGE UP

## 2019-10-14 ENCOUNTER — INPATIENT (INPATIENT)
Facility: HOSPITAL | Age: 84
LOS: 14 days | Discharge: NURSING, REHAB OR LONG-TERM CARE | End: 2019-10-29
Attending: INTERNAL MEDICINE | Admitting: INTERNAL MEDICINE
Payer: COMMERCIAL

## 2019-10-14 VITALS
OXYGEN SATURATION: 100 % | RESPIRATION RATE: 20 BRPM | SYSTOLIC BLOOD PRESSURE: 115 MMHG | HEART RATE: 57 BPM | DIASTOLIC BLOOD PRESSURE: 56 MMHG | TEMPERATURE: 97 F

## 2019-10-14 LAB
ALBUMIN SERPL ELPH-MCNC: 4.2 G/DL — SIGNIFICANT CHANGE UP (ref 3.5–5.2)
ALP SERPL-CCNC: 43 U/L — SIGNIFICANT CHANGE UP (ref 30–115)
ALT FLD-CCNC: 13 U/L — SIGNIFICANT CHANGE UP (ref 0–41)
ANION GAP SERPL CALC-SCNC: 11 MMOL/L — SIGNIFICANT CHANGE UP (ref 7–14)
APPEARANCE UR: CLEAR — SIGNIFICANT CHANGE UP
AST SERPL-CCNC: 23 U/L — SIGNIFICANT CHANGE UP (ref 0–41)
BACTERIA # UR AUTO: NEGATIVE — SIGNIFICANT CHANGE UP
BASE EXCESS BLDV CALC-SCNC: 5.1 MMOL/L — HIGH (ref -2–2)
BASOPHILS # BLD AUTO: 0.05 K/UL — SIGNIFICANT CHANGE UP (ref 0–0.2)
BASOPHILS NFR BLD AUTO: 1 % — SIGNIFICANT CHANGE UP (ref 0–1)
BILIRUB SERPL-MCNC: 0.3 MG/DL — SIGNIFICANT CHANGE UP (ref 0.2–1.2)
BILIRUB UR-MCNC: NEGATIVE — SIGNIFICANT CHANGE UP
BUN SERPL-MCNC: 18 MG/DL — SIGNIFICANT CHANGE UP (ref 10–20)
CA-I SERPL-SCNC: 1.25 MMOL/L — SIGNIFICANT CHANGE UP (ref 1.12–1.3)
CALCIUM SERPL-MCNC: 9.5 MG/DL — SIGNIFICANT CHANGE UP (ref 8.5–10.1)
CHLORIDE SERPL-SCNC: 106 MMOL/L — SIGNIFICANT CHANGE UP (ref 98–110)
CO2 SERPL-SCNC: 27 MMOL/L — SIGNIFICANT CHANGE UP (ref 17–32)
COLOR SPEC: SIGNIFICANT CHANGE UP
CREAT SERPL-MCNC: 1 MG/DL — SIGNIFICANT CHANGE UP (ref 0.7–1.5)
DIFF PNL FLD: SIGNIFICANT CHANGE UP
EOSINOPHIL # BLD AUTO: 0.04 K/UL — SIGNIFICANT CHANGE UP (ref 0–0.7)
EOSINOPHIL NFR BLD AUTO: 0.8 % — SIGNIFICANT CHANGE UP (ref 0–8)
EPI CELLS # UR: 0 /HPF — SIGNIFICANT CHANGE UP (ref 0–5)
GAS PNL BLDV: 144 MMOL/L — SIGNIFICANT CHANGE UP (ref 136–145)
GAS PNL BLDV: SIGNIFICANT CHANGE UP
GLUCOSE SERPL-MCNC: 123 MG/DL — HIGH (ref 70–99)
GLUCOSE UR QL: NEGATIVE — SIGNIFICANT CHANGE UP
HCO3 BLDV-SCNC: 32 MMOL/L — HIGH (ref 22–29)
HCT VFR BLD CALC: 34.2 % — LOW (ref 42–52)
HCT VFR BLDA CALC: 36.3 % — SIGNIFICANT CHANGE UP (ref 34–44)
HGB BLD CALC-MCNC: 11.8 G/DL — LOW (ref 14–18)
HGB BLD-MCNC: 11.2 G/DL — LOW (ref 14–18)
HYALINE CASTS # UR AUTO: 0 /LPF — SIGNIFICANT CHANGE UP (ref 0–7)
IMM GRANULOCYTES NFR BLD AUTO: 0.2 % — SIGNIFICANT CHANGE UP (ref 0.1–0.3)
KETONES UR-MCNC: NEGATIVE — SIGNIFICANT CHANGE UP
LACTATE BLDV-MCNC: 1 MMOL/L — SIGNIFICANT CHANGE UP (ref 0.5–1.6)
LEUKOCYTE ESTERASE UR-ACNC: NEGATIVE — SIGNIFICANT CHANGE UP
LYMPHOCYTES # BLD AUTO: 1.88 K/UL — SIGNIFICANT CHANGE UP (ref 1.2–3.4)
LYMPHOCYTES # BLD AUTO: 39.2 % — SIGNIFICANT CHANGE UP (ref 20.5–51.1)
MCHC RBC-ENTMCNC: 30.1 PG — SIGNIFICANT CHANGE UP (ref 27–31)
MCHC RBC-ENTMCNC: 32.7 G/DL — SIGNIFICANT CHANGE UP (ref 32–37)
MCV RBC AUTO: 91.9 FL — SIGNIFICANT CHANGE UP (ref 80–94)
MONOCYTES # BLD AUTO: 0.37 K/UL — SIGNIFICANT CHANGE UP (ref 0.1–0.6)
MONOCYTES NFR BLD AUTO: 7.7 % — SIGNIFICANT CHANGE UP (ref 1.7–9.3)
NEUTROPHILS # BLD AUTO: 2.44 K/UL — SIGNIFICANT CHANGE UP (ref 1.4–6.5)
NEUTROPHILS NFR BLD AUTO: 51.1 % — SIGNIFICANT CHANGE UP (ref 42.2–75.2)
NITRITE UR-MCNC: NEGATIVE — SIGNIFICANT CHANGE UP
NRBC # BLD: 0 /100 WBCS — SIGNIFICANT CHANGE UP (ref 0–0)
PCO2 BLDV: 55 MMHG — HIGH (ref 41–51)
PH BLDV: 7.37 — SIGNIFICANT CHANGE UP (ref 7.26–7.43)
PH UR: 7.5 — SIGNIFICANT CHANGE UP (ref 5–8)
PLATELET # BLD AUTO: 267 K/UL — SIGNIFICANT CHANGE UP (ref 130–400)
PO2 BLDV: 18 MMHG — LOW (ref 20–40)
POTASSIUM BLDV-SCNC: 4 MMOL/L — SIGNIFICANT CHANGE UP (ref 3.3–5.6)
POTASSIUM SERPL-MCNC: 4.5 MMOL/L — SIGNIFICANT CHANGE UP (ref 3.5–5)
POTASSIUM SERPL-SCNC: 4.5 MMOL/L — SIGNIFICANT CHANGE UP (ref 3.5–5)
PROT SERPL-MCNC: 7 G/DL — SIGNIFICANT CHANGE UP (ref 6–8)
PROT UR-MCNC: ABNORMAL
RBC # BLD: 3.72 M/UL — LOW (ref 4.7–6.1)
RBC # FLD: 14.8 % — HIGH (ref 11.5–14.5)
RBC CASTS # UR COMP ASSIST: 10 /HPF — HIGH (ref 0–4)
SAO2 % BLDV: 20 % — SIGNIFICANT CHANGE UP
SODIUM SERPL-SCNC: 144 MMOL/L — SIGNIFICANT CHANGE UP (ref 135–146)
SP GR SPEC: 1.02 — SIGNIFICANT CHANGE UP (ref 1.01–1.02)
UROBILINOGEN FLD QL: SIGNIFICANT CHANGE UP
WBC # BLD: 4.79 K/UL — LOW (ref 4.8–10.8)
WBC # FLD AUTO: 4.79 K/UL — LOW (ref 4.8–10.8)
WBC UR QL: 0 /HPF — SIGNIFICANT CHANGE UP (ref 0–5)

## 2019-10-14 PROCEDURE — 71046 X-RAY EXAM CHEST 2 VIEWS: CPT | Mod: 26

## 2019-10-14 PROCEDURE — 93010 ELECTROCARDIOGRAM REPORT: CPT

## 2019-10-14 PROCEDURE — 70450 CT HEAD/BRAIN W/O DYE: CPT | Mod: 26

## 2019-10-14 PROCEDURE — 99285 EMERGENCY DEPT VISIT HI MDM: CPT

## 2019-10-14 PROCEDURE — 72170 X-RAY EXAM OF PELVIS: CPT | Mod: 26

## 2019-10-14 RX ORDER — HYDROCHLOROTHIAZIDE 25 MG
12.5 TABLET ORAL DAILY
Refills: 0 | Status: DISCONTINUED | OUTPATIENT
Start: 2019-10-14 | End: 2019-10-29

## 2019-10-14 RX ORDER — POTASSIUM CHLORIDE 20 MEQ
20 PACKET (EA) ORAL DAILY
Refills: 0 | Status: DISCONTINUED | OUTPATIENT
Start: 2019-10-14 | End: 2019-10-15

## 2019-10-14 RX ORDER — ENOXAPARIN SODIUM 100 MG/ML
40 INJECTION SUBCUTANEOUS DAILY
Refills: 0 | Status: DISCONTINUED | OUTPATIENT
Start: 2019-10-14 | End: 2019-10-18

## 2019-10-14 RX ORDER — AMLODIPINE BESYLATE 2.5 MG/1
5 TABLET ORAL DAILY
Refills: 0 | Status: DISCONTINUED | OUTPATIENT
Start: 2019-10-14 | End: 2019-10-29

## 2019-10-14 RX ORDER — ATORVASTATIN CALCIUM 80 MG/1
1 TABLET, FILM COATED ORAL
Qty: 0 | Refills: 0 | DISCHARGE

## 2019-10-14 RX ORDER — AMLODIPINE BESYLATE 2.5 MG/1
1 TABLET ORAL
Qty: 0 | Refills: 0 | DISCHARGE

## 2019-10-14 RX ORDER — ATORVASTATIN CALCIUM 80 MG/1
20 TABLET, FILM COATED ORAL AT BEDTIME
Refills: 0 | Status: DISCONTINUED | OUTPATIENT
Start: 2019-10-14 | End: 2019-10-29

## 2019-10-14 RX ORDER — TAMSULOSIN HYDROCHLORIDE 0.4 MG/1
1 CAPSULE ORAL
Qty: 0 | Refills: 0 | DISCHARGE

## 2019-10-14 RX ORDER — TAMSULOSIN HYDROCHLORIDE 0.4 MG/1
0.4 CAPSULE ORAL AT BEDTIME
Refills: 0 | Status: DISCONTINUED | OUTPATIENT
Start: 2019-10-14 | End: 2019-10-29

## 2019-10-14 RX ADMIN — ATORVASTATIN CALCIUM 20 MILLIGRAM(S): 80 TABLET, FILM COATED ORAL at 21:44

## 2019-10-14 RX ADMIN — TAMSULOSIN HYDROCHLORIDE 0.4 MILLIGRAM(S): 0.4 CAPSULE ORAL at 21:44

## 2019-10-14 NOTE — ED PROVIDER NOTE - NS ED ROS FT
Constitutional: No fevers.   Eyes:  No visual changes, eye pain or discharge.  ENMT:  No hearing changes, pain, no sore throat or runny nose, no difficulty swallowing  Cardiac:  No chest pain, SOB or edema.   Respiratory:  No cough or respiratory distress. No hemoptysis.   GI:  No nausea, vomiting, diarrhea or abdominal pain.  :  No dysuria, frequency or burning.  MS:  No myalgia, muscle weakness, joint pain or back pain.  Neuro:  +diffuse weakness.   Skin:  No skin rash.   Endocrine: No history of thyroid disease or diabetes.  Psych: +hallucinations.

## 2019-10-14 NOTE — H&P ADULT - HISTORY OF PRESENT ILLNESS
CC: Hallucinations     87 year old male   PMH: Metastatic prostate cancer, hypertension, dyslipidemia, hypokalemia    Past Surgical History: No known past surgical history     History of Present Illness goes back to 9/2019 when patient suddenly started having altered behaviour per the son. He was more angry and defiant than usual. The patient then started having hallucinations witnessed by the son as he was called by the patient saying there is a lady in his bed, described as a dressed skeleton however she would not talk. He would also see children playing in his living room silently without responding to him. He denies hearing voices when no one was present. He says he believes these people were trying to steal thing from his home and take his home which is worrying him the most to the point he called the police 3 times. These hallucinations occur mainly at nighttime.   Of note, patient's wife passed away in 6/2019 after long illness and per the son the patient has not been the same since.     In the ED, vitals were stable, labs were unremarkable, UA was negative and a CT scan showed cerebral and cerebellar atrophy.

## 2019-10-14 NOTE — H&P ADULT - ASSESSMENT
================= ASSESSMENT/PLAN ==================  Patient is a 87y old Male who presents with a chief complaint of Currently admitted to medicine with the primary diagnosis of Hallucinations    # Acute onset visual hallucinations   No known withdrawals. No evident metabolic causes. No evident infectious etiologies. No acute intracranial pathologies on CT scan   DDX:   1-Metabolic disease: B12, thyroid disease   2-Infectious: Neurosyphilis   3-Psych: Schizophrenia, depression  4-Neurological: ?lewy body dementia with cerebral atrophy, new brain mets   PLAN  - Will send TSH, B12, RPR and ammonia   - Neurology consult for EEG and possible MRI   - Psych consult     # Hyperlipidemia  # Hypertension   - Continue home medications for now   - Monitor     # Metastatic prostate cancer   - Patient was on Zytiga however possibly stopped recently   - Contact Dr. Juan Diego Saxena for confirmation at [phone]    # Hypokalemia   - Continue with supplementation   - Monitor BMP     GI PPX: Not indicated    DVT PPX: Lovenox     DIET: DASH   ACTIVITY:  () Ad Joselyn  /  (X) Advance as Tolerated  /  () Bed Rest  /  () Fall Precaution  /  () Seizure precaution    ================= DISPOSITION ==================    Patient to be discharged when condition(s) optimized.            Discharge to: Home when cleared. ?home health aide             Home services:              Counseled on/Discussed cessation of:  () Risky behaviors  /  () Smoking cessation  /  () Diet  /  () Exercise  /  () Other    ================= CODE STATUS =================                  (X) FULL CODE     |     () DNR     |     () DNI    () Discussion with patient and/or family regarding goals of care ================= ASSESSMENT/PLAN ==================  Patient is a 87y old Male who presents with a chief complaint of Currently admitted to medicine with the primary diagnosis of Hallucinations    # Acute onset visual hallucinations   No known withdrawals. No evident metabolic causes. No evident infectious etiologies. No acute intracranial pathologies on CT scan   DDX:   1-Metabolic disease: B12, thyroid disease   2-Infectious: Neurosyphilis   3-Psych: Schizophrenia, depression or bereavement   4-Neurological: ?lewy body dementia with cerebral atrophy, new brain mets   PLAN  - Will send TSH, B12, RPR and ammonia   - Neurology consult for EEG and possible MRI   - Psych consult     # Hyperlipidemia  # Hypertension   - Continue home medications for now   - Monitor     # Metastatic prostate cancer   - Patient was on Zytiga however possibly stopped recently   - Contact Dr. Juan Diego Saxena for confirmation at [phone]    # Hypokalemia   - Continue with supplementation   - Monitor BMP     GI PPX: Not indicated    DVT PPX: Lovenox     DIET: DASH   ACTIVITY:  () Ad Joselyn  /  (X) Advance as Tolerated  /  () Bed Rest  /  () Fall Precaution  /  () Seizure precaution    ================= DISPOSITION ==================    Patient to be discharged when condition(s) optimized.            Discharge to: Home when cleared. ?home health aide             Home services:              Counseled on/Discussed cessation of:  () Risky behaviors  /  () Smoking cessation  /  () Diet  /  () Exercise  /  () Other    ================= CODE STATUS =================                  (X) FULL CODE     |     () DNR     |     () DNI    () Discussion with patient and/or family regarding goals of care

## 2019-10-14 NOTE — ED PROVIDER NOTE - CLINICAL SUMMARY MEDICAL DECISION MAKING FREE TEXT BOX
88 y/o male with multiple medical problems presented to ED with son for intermittent visual hallucinations over the several months worse recently, causing him to call police. No other complaints. Labs, imaging obtained in ED and reviewed with pt and son. No evidence of brain mets, ICH, obvious source of infection. Will admit for further medical management. Pt stable on admission.

## 2019-10-14 NOTE — ED PROVIDER NOTE - ATTENDING CONTRIBUTION TO CARE
86 yo M with hx of stage 4 prostate CA with meds to bone, htn, episodes of hypokalemia, here with hallucinations or unknown duration. Pt's wife recently  and pt lives alone. Pt in the ED with his son who has concerns about patients current living situation. NO fever, no pain or other complaints. Pt is well appearing and exam nonfocal/nonrevealing. Labs CT head and likely admit for placement vs identified medical issue.

## 2019-10-14 NOTE — ED ADULT NURSE NOTE - NSIMPLEMENTINTERV_GEN_ALL_ED
Implemented All Fall with Harm Risk Interventions:  [place] to call system. Call bell, personal items and telephone within reach. Instruct patient to call for assistance. Room bathroom lighting operational. Non-slip footwear when patient is off stretcher. Physically safe environment: no spills, clutter or unnecessary equipment. Stretcher in lowest position, wheels locked, appropriate side rails in place. Provide visual cue, wrist band, yellow gown, etc. Monitor gait and stability. Monitor for mental status changes and reorient to person, place, and time. Review medications for side effects contributing to fall risk. Reinforce activity limits and safety measures with patient and family. Provide visual clues: red socks.

## 2019-10-14 NOTE — H&P ADULT - NSHPLABSRESULTS_GEN_ALL_CORE
===================== LABS =====================                        11.2   4.79  )-----------( 267      ( 14 Oct 2019 15:20 )             34.2     10-14    144  |  106  |  18  ----------------------------<  123<H>  4.5   |  27  |  1.0    Ca    9.5      14 Oct 2019 15:20    TPro  7.0  /  Alb  4.2  /  TBili  0.3  /  DBili  x   /  AST  23  /  ALT  13  /  AlkPhos  43  10-14      Urinalysis Basic - ( 14 Oct 2019 14:50 )    Color: Light Yellow / Appearance: Clear / S.021 / pH: x  Gluc: x / Ketone: Negative  / Bili: Negative / Urobili: <2 mg/dL   Blood: x / Protein: 30 mg/dL / Nitrite: Negative   Leuk Esterase: Negative / RBC: 10 /HPF / WBC 0 /HPF   Sq Epi: x / Non Sq Epi: 0 /HPF / Bacteria: Negative    ================= MICROBIOLOGY ================    ================== IMAGING ==================    < from: CT Head No Cont (10.14.19 @ 16:06) >    The third and lateral ventricles are mildly enlarged as are the cortical   sulci consistent with a mild degree of cortical atrophy.  The fourth   ventricle is normal in size and position.    There is no shift of the midline structures, evidence of acute   intracranial hemorrhage, or depressed skull fracture.    The right frontal sinus is hypoplastic.    Sclerosis of the mastoid tips consistent with chronic inflammatory   changes.    Bilateral basal ganglia calcifications.    Bilateral medial scleral calcifications.    Mild prominence of the cerebellar folia consistent with a mild degree of   cerebellar atrophy.    Widening of the parietal extra-axial compartments (right greater than   left).    In the left frontal extracalvarial soft tissues there is a hypodense   lesion measuring approximately 2.8 cm in maximum diameter consistent with   a lipoma.    IMPRESSION:    Cerebral and cerebellar atrophy.    ================== EKG ==================    < from: 12 Lead ECG (10.14.19 @ 14:06) >    Diagnosis Line Sinus bradycardia with 1st degree A-V block  ST & T wave abnormality, consider anterior ischemia  Abnormal ECG

## 2019-10-14 NOTE — ED PROVIDER NOTE - OBJECTIVE STATEMENT
Patient is an 88 yo M w/ hx of prostate cancer w/ mets to bones, HTN, HLD, hypokalemia p/w hallucinations. Patient has had visual and auditory hallucinations x 5 months; patient lives alone where he has called his son endorsing people in his house and a women visiting him in bed; patient denies psych hx. Patient's wife  few months ago and since patient's health has declined; he has  lost weight, has had multiple falls. Patient had recent fall 3 weeks ago. Otherwise patient denies pain currently, denies symptoms. Son has tried to get home health aid or placement for patient but has not reached success.

## 2019-10-14 NOTE — ED ADULT TRIAGE NOTE - CHIEF COMPLAINT QUOTE
Patient brought in by son for hallucinations. Patient hallucinating that people are there that aren't and objects are moving around him. Patient lives alone and unsteady. Denies suicidal/homicidal ideations. Son at bedside.

## 2019-10-14 NOTE — H&P ADULT - ATTENDING COMMENTS
pt seen and examined independently, I have read and agree with a shlomo exam and poa,    poor histoiran, refuses to answer questions    h/o from chart    halluications  recent forgetfulness  dyslip  htn  metastatic prostate ca  hypkalemia    eeg  rpr/fol  psych eval  neuro eval  social service for placement

## 2019-10-14 NOTE — ED PROVIDER NOTE - PHYSICAL EXAMINATION
CONSTITUTIONAL: Well-developed; frail; in no acute distress.   SKIN: warm, dry.  HEAD: Normocephalic; atraumatic.  EYES: chronic changes to right eye. no conjunctival erythema  ENT: No nasal discharge; airway clear.  NECK: Supple; non tender.  CARD: S1, S2 normal; no murmurs, gallops, or rubs. Regular rate and rhythm.   RESP: No wheezes, rales or rhonchi.  ABD: soft ntnd.  EXT: Normal ROM.  No clubbing, cyanosis or edema.   NEURO: Alert, oriented, grossly unremarkable  PSYCH: Cooperative, appropriate.

## 2019-10-14 NOTE — H&P ADULT - NSHPPHYSICALEXAM_GEN_ALL_CORE
=================== OBJECTIVE ===================    VITAL SIGNS: Last 24 Hours  T(C): 35.9 (14 Oct 2019 12:02), Max: 35.9 (14 Oct 2019 12:02)  T(F): 96.7 (14 Oct 2019 12:02), Max: 96.7 (14 Oct 2019 12:02)  HR: 57 (14 Oct 2019 12:02) (57 - 57)  BP: 115/56 (14 Oct 2019 12:02) (115/56 - 115/56)  BP(mean): --  RR: 20 (14 Oct 2019 12:02) (20 - 20)  SpO2: 100% (14 Oct 2019 12:02) (100% - 100%)    PHYSICAL EXAM:  GENERAL: NAD, well-developed  HEAD:  Atraumatic, Normocephalic  CHEST/LUNG: Clear to auscultation bilaterally; No wheeze  HEART: Regular rate and rhythm; No murmurs, rubs, or gallops  ABDOMEN: Soft, Nontender, Nondistended; Bowel sounds present in all 4 quadrants   PSYCH: AAOx4, combative at first eventually calmed down. Has ?paranoid thoughts and tangantiel thoughts at time. Patient head down during interview barely making eye contact. Sometimes breaking down and crying.    NEUROLOGY: non-focal. No ataxia, no wide based gait, no nystagmus, no romberg. Plantar reflex downwards.   General:  Appearance is consistent with chronologic age.  No abnormal facies.   SKIN: No rashes or lesions

## 2019-10-14 NOTE — H&P ADULT - NSHPREVIEWOFSYSTEMS_GEN_ALL_CORE
REVIEW OF SYSTEMS:    CONSTITUTIONAL: + weakness, + weight loss, no fevers or chills  EYES/ENT: No visual changes;  No vertigo or throat pain   NECK: No pain or stiffness  RESPIRATORY: No cough, wheezing, hemoptysis; No shortness of breath  CARDIOVASCULAR: No chest pain or palpitations  GASTROINTESTINAL: No abdominal or epigastric pain. No nausea, vomiting, or hematemesis; No diarrhea or constipation. No melena or hematochezia.  GENITOURINARY: No dysuria, frequency or hematuria  NEUROLOGICAL: + hallucinations   SKIN: No itching, rashes

## 2019-10-15 DIAGNOSIS — R41.0 DISORIENTATION, UNSPECIFIED: ICD-10-CM

## 2019-10-15 DIAGNOSIS — F03.90 UNSPECIFIED DEMENTIA WITHOUT BEHAVIORAL DISTURBANCE: ICD-10-CM

## 2019-10-15 LAB
ALBUMIN SERPL ELPH-MCNC: 4.3 G/DL — SIGNIFICANT CHANGE UP (ref 3.5–5.2)
ALP SERPL-CCNC: 47 U/L — SIGNIFICANT CHANGE UP (ref 30–115)
ALT FLD-CCNC: 14 U/L — SIGNIFICANT CHANGE UP (ref 0–41)
AMMONIA BLD-MCNC: 28 UMOL/L — SIGNIFICANT CHANGE UP (ref 11–55)
ANION GAP SERPL CALC-SCNC: 12 MMOL/L — SIGNIFICANT CHANGE UP (ref 7–14)
AST SERPL-CCNC: 23 U/L — SIGNIFICANT CHANGE UP (ref 0–41)
BASOPHILS # BLD AUTO: 0.03 K/UL — SIGNIFICANT CHANGE UP (ref 0–0.2)
BASOPHILS NFR BLD AUTO: 0.6 % — SIGNIFICANT CHANGE UP (ref 0–1)
BILIRUB SERPL-MCNC: 0.5 MG/DL — SIGNIFICANT CHANGE UP (ref 0.2–1.2)
BUN SERPL-MCNC: 16 MG/DL — SIGNIFICANT CHANGE UP (ref 10–20)
CALCIUM SERPL-MCNC: 9.3 MG/DL — SIGNIFICANT CHANGE UP (ref 8.5–10.1)
CHLORIDE SERPL-SCNC: 103 MMOL/L — SIGNIFICANT CHANGE UP (ref 98–110)
CO2 SERPL-SCNC: 25 MMOL/L — SIGNIFICANT CHANGE UP (ref 17–32)
CREAT SERPL-MCNC: 0.9 MG/DL — SIGNIFICANT CHANGE UP (ref 0.7–1.5)
EOSINOPHIL # BLD AUTO: 0.05 K/UL — SIGNIFICANT CHANGE UP (ref 0–0.7)
EOSINOPHIL NFR BLD AUTO: 1 % — SIGNIFICANT CHANGE UP (ref 0–8)
FOLATE SERPL-MCNC: >20 NG/ML — SIGNIFICANT CHANGE UP
GLUCOSE SERPL-MCNC: 97 MG/DL — SIGNIFICANT CHANGE UP (ref 70–99)
HCT VFR BLD CALC: 34.6 % — LOW (ref 42–52)
HGB BLD-MCNC: 11.3 G/DL — LOW (ref 14–18)
IMM GRANULOCYTES NFR BLD AUTO: 0.2 % — SIGNIFICANT CHANGE UP (ref 0.1–0.3)
LYMPHOCYTES # BLD AUTO: 1.63 K/UL — SIGNIFICANT CHANGE UP (ref 1.2–3.4)
LYMPHOCYTES # BLD AUTO: 32.3 % — SIGNIFICANT CHANGE UP (ref 20.5–51.1)
MAGNESIUM SERPL-MCNC: 2 MG/DL — SIGNIFICANT CHANGE UP (ref 1.8–2.4)
MCHC RBC-ENTMCNC: 29.9 PG — SIGNIFICANT CHANGE UP (ref 27–31)
MCHC RBC-ENTMCNC: 32.7 G/DL — SIGNIFICANT CHANGE UP (ref 32–37)
MCV RBC AUTO: 91.5 FL — SIGNIFICANT CHANGE UP (ref 80–94)
MONOCYTES # BLD AUTO: 0.42 K/UL — SIGNIFICANT CHANGE UP (ref 0.1–0.6)
MONOCYTES NFR BLD AUTO: 8.3 % — SIGNIFICANT CHANGE UP (ref 1.7–9.3)
NEUTROPHILS # BLD AUTO: 2.91 K/UL — SIGNIFICANT CHANGE UP (ref 1.4–6.5)
NEUTROPHILS NFR BLD AUTO: 57.6 % — SIGNIFICANT CHANGE UP (ref 42.2–75.2)
NRBC # BLD: 0 /100 WBCS — SIGNIFICANT CHANGE UP (ref 0–0)
PLATELET # BLD AUTO: 237 K/UL — SIGNIFICANT CHANGE UP (ref 130–400)
POTASSIUM SERPL-MCNC: 4.6 MMOL/L — SIGNIFICANT CHANGE UP (ref 3.5–5)
POTASSIUM SERPL-SCNC: 4.6 MMOL/L — SIGNIFICANT CHANGE UP (ref 3.5–5)
PROT SERPL-MCNC: 7.3 G/DL — SIGNIFICANT CHANGE UP (ref 6–8)
RBC # BLD: 3.78 M/UL — LOW (ref 4.7–6.1)
RBC # FLD: 14.5 % — SIGNIFICANT CHANGE UP (ref 11.5–14.5)
SODIUM SERPL-SCNC: 140 MMOL/L — SIGNIFICANT CHANGE UP (ref 135–146)
TSH SERPL-MCNC: 2.12 UIU/ML — SIGNIFICANT CHANGE UP (ref 0.27–4.2)
VIT B12 SERPL-MCNC: 457 PG/ML — SIGNIFICANT CHANGE UP (ref 232–1245)
WBC # BLD: 5.05 K/UL — SIGNIFICANT CHANGE UP (ref 4.8–10.8)
WBC # FLD AUTO: 5.05 K/UL — SIGNIFICANT CHANGE UP (ref 4.8–10.8)

## 2019-10-15 PROCEDURE — 99222 1ST HOSP IP/OBS MODERATE 55: CPT

## 2019-10-15 RX ORDER — HALOPERIDOL DECANOATE 100 MG/ML
1 INJECTION INTRAMUSCULAR ONCE
Refills: 0 | Status: COMPLETED | OUTPATIENT
Start: 2019-10-15 | End: 2019-10-15

## 2019-10-15 RX ORDER — DONEPEZIL HYDROCHLORIDE 10 MG/1
5 TABLET, FILM COATED ORAL AT BEDTIME
Refills: 0 | Status: DISCONTINUED | OUTPATIENT
Start: 2019-10-15 | End: 2019-10-29

## 2019-10-15 RX ADMIN — HALOPERIDOL DECANOATE 1 MILLIGRAM(S): 100 INJECTION INTRAMUSCULAR at 13:53

## 2019-10-15 RX ADMIN — HALOPERIDOL DECANOATE 1 MILLIGRAM(S): 100 INJECTION INTRAMUSCULAR at 23:28

## 2019-10-15 RX ADMIN — Medication 1 TABLET(S): at 12:11

## 2019-10-15 RX ADMIN — Medication 1 TABLET(S): at 12:12

## 2019-10-15 NOTE — PROGRESS NOTE ADULT - ASSESSMENT
# Acute onset visual hallucinations   - No known withdrawals. No evident metabolic causes. No evident infectious etiologies. No acute intracranial pathologies on CT scan   - CTH with no acute pathology  - pending psych and neurology input   - normal TSH, B12, RPR and ammonia     # Hyperlipidemia: c/w statin   # Hypertension: c/w amlodipine and HCTZ  # Metastatic prostate cancer - Patient was on Zytiga however possibly stopped recently, c/w tamsulosin     GI PPX: Not indicated    DVT PPX: Lovenox   Dispo: from home, social input needed # Acute onset visual hallucinations   - No known withdrawals. No evident metabolic causes. No evident infectious etiologies. No acute intracranial pathologies on CT scan   - CTH with no acute pathology  - per psych most likely dementia related with possible superimposed delirium , no intervention  - discussed with neurology, will start donepezil, can start small dose Seroquel   - normal TSH, B12, RPR and ammonia     # Hyperlipidemia: c/w statin   # Hypertension: c/w amlodipine and HCTZ  # Metastatic prostate cancer - Patient was on Zytiga however possibly stopped recently, c/w tamsulosin     GI PPX: Not indicated    DVT PPX: Lovenox   Dispo: from home, social input needed

## 2019-10-15 NOTE — BEHAVIORAL HEALTH ASSESSMENT NOTE - NSBHCHARTREVIEWLAB_PSY_A_CORE FT
11.3   5.05  )-----------( 237      ( 15 Oct 2019 08:32 )             34.6                10-15    140  |  103  |  16  ----------------------------<  97  4.6   |  25  |  0.9    Ca    9.3      15 Oct 2019 08:32  Mg     2.0     10-15    TPro  7.3  /  Alb  4.3  /  TBili  0.5  /  DBili  x   /  AST  23  /  ALT  14  /  AlkPhos  47  10-15

## 2019-10-15 NOTE — PROGRESS NOTE ADULT - SUBJECTIVE AND OBJECTIVE BOX
SUBJECTIVE:    Patient is a 87y old Male who presents with a chief complaint of Hallucinations (15 Oct 2019 10:07)  Currently admitted to medicine with the primary diagnosis of Hallucinations  Today is hospital day 1d. This morning pt is agitated and hallucinating, put on 1:1 pending psych evaluation.     PAST MEDICAL & SURGICAL HISTORY  H/O hypokalemia  Dyslipidemia  Hypertension  Prostate cancer    SOCIAL HISTORY:  Negative for smoking/alcohol/drug use.     ALLERGIES:  No Known Allergies    MEDICATIONS:  STANDING MEDICATIONS  amLODIPine   Tablet 5 milliGRAM(s) Oral daily  atorvastatin 20 milliGRAM(s) Oral at bedtime  calcium carbonate 1250 mG  + Vitamin D (OsCal 500 + D) 1 Tablet(s) Oral daily  enoxaparin Injectable 40 milliGRAM(s) SubCutaneous daily  hydrochlorothiazide 12.5 milliGRAM(s) Oral daily  multivitamin 1 Tablet(s) Oral daily  tamsulosin 0.4 milliGRAM(s) Oral at bedtime    PRN MEDICATIONS    VITALS:   T(F): 97.8  HR: 70  BP: 146/66  RR: 18  SpO2: 99%    LABS:                        11.3   5.05  )-----------( 237      ( 15 Oct 2019 08:32 )             34.6     10-15    140  |  103  |  16  ----------------------------<  97  4.6   |  25  |  0.9    Ca    9.3      15 Oct 2019 08:32  Mg     2.0     10-15    TPro  7.3  /  Alb  4.3  /  TBili  0.5  /  DBili  x   /  AST  23  /  ALT  14  /  AlkPhos  47  10-15      Urinalysis Basic - ( 14 Oct 2019 14:50 )    Color: Light Yellow / Appearance: Clear / S.021 / pH: x  Gluc: x / Ketone: Negative  / Bili: Negative / Urobili: <2 mg/dL   Blood: x / Protein: 30 mg/dL / Nitrite: Negative   Leuk Esterase: Negative / RBC: 10 /HPF / WBC 0 /HPF   Sq Epi: x / Non Sq Epi: 0 /HPF / Bacteria: Negative    RADIOLOGY:    < from: CT Head No Cont (10.14.19 @ 16:06) >  IMPRESSION:    Cerebral and cerebellar atrophy.    < end of copied text >      PHYSICAL EXAM:  GEN: in mild distress  LUNGS: Clear to auscultation bilaterally   HEART: S1/S2 present.  .   ABD: Soft, non-tender, non-distended. Bowel sounds present  EXT: No ll edema   NEURO: not cooperative, does not answer question

## 2019-10-15 NOTE — BEHAVIORAL HEALTH ASSESSMENT NOTE - NSBHCONSULTFOLLOWAFTERCARE_PSY_A_CORE FT
If patient and son are agreeable , patient can be referred to [facility] , [facility] [address], [place] [address], phone number: [phone]   for supportive psychotherapy at home If patient and son are agreeable , patient can be referred to [facility] , [facility] [address], [place] [address], phone number: [phone]  for supportive psychotherapy at home

## 2019-10-15 NOTE — CONSULT NOTE ADULT - ASSESSMENT
ASSESSMENT    Patient lying in bed comfortably. Moves all extremities. Patient is very sure that he saw 3 children aged almost 12 - 15 dressed as skeleton when he called the police. He says that the police didn't came inside that's why they were unable to see the children. Also reports history of difficulty remembering names since the last 1.5 year.   He used to live with her wife before her death and now lives alone    PLAN:  MRI Brain  B12, Folate  RPR      *Note will be finalized after discussion with the attending*

## 2019-10-15 NOTE — CONSULT NOTE ADULT - SUBJECTIVE AND OBJECTIVE BOX
Neurology Consult    Patient is a 87y old  Male who presents with a chief complaint of Hallucinations      HPI:  CC: Hallucinations     87 year old male   PMH: Metastatic prostate cancer, hypertension, dyslipidemia, hypokalemia    Past Surgical History: No known past surgical history     History of Present Illness goes back to 2019 when patient suddenly started having altered behaviour per the son. He was more angry and defiant than usual. The patient then started having hallucinations witnessed by the son as he was called by the patient saying there is a lady in his bed, described as a dressed skeleton however she would not talk. He would also see children playing in his living room silently without responding to him. He denies hearing voices when no one was present. He says he believes these people were trying to steal thing from his home and take his home which is worrying him the most to the point he called the police 3 times. These hallucinations occur mainly at nighttime.   Of note, patient's wife passed away in 2019 after long illness and per the son the patient has not been the same since.     In the ED, vitals were stable, labs were unremarkable, UA was negative and a CT scan showed cerebral and cerebellar atrophy. (14 Oct 2019 19:49)      PAST MEDICAL & SURGICAL HISTORY:  H/O hypokalemia  Dyslipidemia  Hypertension  Prostate cancer      FAMILY HISTORY:      Social History: (-) x 3    Allergies    No Known Allergies    Intolerances        MEDICATIONS  (STANDING):  amLODIPine   Tablet 5 milliGRAM(s) Oral daily  atorvastatin 20 milliGRAM(s) Oral at bedtime  calcium carbonate 1250 mG  + Vitamin D (OsCal 500 + D) 1 Tablet(s) Oral daily  enoxaparin Injectable 40 milliGRAM(s) SubCutaneous daily  hydrochlorothiazide 12.5 milliGRAM(s) Oral daily  multivitamin 1 Tablet(s) Oral daily  tamsulosin 0.4 milliGRAM(s) Oral at bedtime    MEDICATIONS  (PRN):      Review of systems:    Constitutional: as per HPI  Eyes: No eye pain or discharge  ENMT:  No difficulty hearing; No sinus or throat pain  Neck: No pain or stiffness  Respiratory: No cough, wheezing, chills or hemoptysis  Cardiovascular: No chest pain, palpitations, shortness of breath, dyspnea on exertion  Gastrointestinal: No abdominal pain, nausea, vomiting or hematemesis; No diarrhea or constipation.   Genitourinary: No dysuria, frequency, hematuria or incontinence  Neurological: As per HPI  Skin: No rashes or lesions   Endocrine: No heat or cold intolerance; No hair loss  Musculoskeletal: No joint pain or swelling  Psychiatric: No depression, anxiety, mood swings  Heme/Lymph: No easy bruising or bleeding gums    Vital Signs Last 24 Hrs  T(C): 35.8 (15 Oct 2019 08:06), Max: 36.3 (15 Oct 2019 00:29)  T(F): 96.4 (15 Oct 2019 08:06), Max: 97.4 (15 Oct 2019 00:29)  HR: 54 (15 Oct 2019 08:06) (54 - 57)  BP: 142/66 (15 Oct 2019 08:06) (88/53 - 142/66)  BP(mean): --  RR: 18 (15 Oct 2019 08:06) (18 - 20)  SpO2: 100% (15 Oct 2019 08:06) (100% - 100%)    Examination:  General:  Appearance is consistent with chronologic age.  No abnormal facies.  Gross skin survey within normal limits.    Cognitive/Language:  The patient is oriented to person, place, time and date. Language with normal repetition, comprehension and naming. Speaks slowly.  Eyes: intact VA  Face:  Facial sensation normal V1 - 3, no facial asymmetry.    Ears/Nose/Throat:  Hearing grossly intact b/l.  Palate elevates midline.  Tongue and uvula midline.   Motor examination:   Normal tone, bulk and range of motion. Slight tremor bilaterally  Formal Muscle Strength Testing: (MRC grade R/L) 5/5 UE; 5/5 LE.  No observable drift.  Reflexes:   2+ b/l pectoralis, biceps, triceps, brachioradialis, patella and Achilles.  Plantar response downgoing b/l.  Jaw jerk, Angela, clonus absent.  Sensory examination: Intact to light touch and pinprick, pain, temperature and proprioception and vibration in all extremities.  Cerebellum: FTN/HKS intact with normal CASSANDRA in all limbs.  No dysmetria or dysdiadokinesia.  Gait narrow based and normal.  Respiratory:  no audible wheezing or inspiratory stridor.  no use of accessory muscles.   Cardiac: pulse palpable, no audible bruits  Abdomen: supple, no guarding, no TTP    Labs:   CBC Full  -  ( 15 Oct 2019 08:32 )  WBC Count : 5.05 K/uL  RBC Count : 3.78 M/uL  Hemoglobin : 11.3 g/dL  Hematocrit : 34.6 %  Platelet Count - Automated : 237 K/uL  Mean Cell Volume : 91.5 fL  Mean Cell Hemoglobin : 29.9 pg  Mean Cell Hemoglobin Concentration : 32.7 g/dL  Auto Neutrophil # : 2.91 K/uL  Auto Lymphocyte # : 1.63 K/uL  Auto Monocyte # : 0.42 K/uL  Auto Eosinophil # : 0.05 K/uL  Auto Basophil # : 0.03 K/uL  Auto Neutrophil % : 57.6 %  Auto Lymphocyte % : 32.3 %  Auto Monocyte % : 8.3 %  Auto Eosinophil % : 1.0 %  Auto Basophil % : 0.6 %    10-15    140  |  103  |  16  ----------------------------<  97  4.6   |  25  |  0.9    Ca    9.3      15 Oct 2019 08:32  Mg     2.0     10-15    TPro  7.3  /  Alb  4.3  /  TBili  0.5  /  DBili  x   /  AST  23  /  ALT  14  /  AlkPhos  47  10-15    LIVER FUNCTIONS - ( 15 Oct 2019 08:32 )  Alb: 4.3 g/dL / Pro: 7.3 g/dL / ALK PHOS: 47 U/L / ALT: 14 U/L / AST: 23 U/L / GGT: x             Urinalysis Basic - ( 14 Oct 2019 14:50 )    Color: Light Yellow / Appearance: Clear / S.021 / pH: x  Gluc: x / Ketone: Negative  / Bili: Negative / Urobili: <2 mg/dL   Blood: x / Protein: 30 mg/dL / Nitrite: Negative   Leuk Esterase: Negative / RBC: 10 /HPF / WBC 0 /HPF   Sq Epi: x / Non Sq Epi: 0 /HPF / Bacteria: Negative          Neuroimaging:  NCHCT: CT Head No Cont:   EXAM:  CT BRAIN            PROCEDURE DATE:  10/14/2019            INTERPRETATION:  Clinical History / Reason for exam: Hallucinations,   change in mental status.    CT SCAN OF THE BRAIN WITHOUT CONTRAST    TECHNIQUE:    Multiple transaxial noncontrast CT images of the brain were obtained from   base to vertex. Sagittal and coronal reformatted images were obtained.    FINDINGS:    The third and lateral ventricles are mildly enlarged as are the cortical   sulci consistent with a mild degree of cortical atrophy.  The fourth   ventricle is normal in size and position.    There is no shift of the midline structures, evidence of acute   intracranial hemorrhage, or depressed skull fracture.    The right frontal sinus is hypoplastic.    Sclerosis of the mastoid tips consistent with chronic inflammatory   changes.    Bilateral basal ganglia calcifications.    Bilateral medial scleral calcifications.    Mild prominence of the cerebellar folia consistent with a mild degree of   cerebellar atrophy.    Widening of the parietal extra-axial compartments (right greater than   left).    In the left frontal extracalvarial soft tissues there is a hypodense   lesion measuring approximately 2.8 cm in maximum diameter consistent with   a lipoma.    IMPRESSION:    Cerebral and cerebellar atrophy.                  ANTWON MATOS M.D., ATTENDING RADIOLOGIST  This document has been electronically signed. Oct 14 2019  4:31PM             (10-14-19 @ 16:06)      10-15-19 @ 10:08

## 2019-10-15 NOTE — BEHAVIORAL HEALTH ASSESSMENT NOTE - HPI (INCLUDE ILLNESS QUALITY, SEVERITY, DURATION, TIMING, CONTEXT, MODIFYING FACTORS, ASSOCIATED SIGNS AND SYMPTOMS)
Mr Stearns is an 87 year old  man , , has one adult son, resides on his own with no history of  a psychiatric illness who was admitted to the medical services for altered mental status. Psychiatry consult was called for the evaluation of visual Hallucinations . Patient reports that he would sometimes see strange people in his house . however he reports that his son has made it known to him several times that there is no one there. He reports that it can be distressing at times. He reports that he started seeing strange people after the death of his wife of 47 years one and a half moths ago. patient reports that he is very sad about the fact that he his wife passed away , however he cherishes the memories  of the things they did together and he andrew by continuing to listen and play his Jazz music. patient reports  that as long as he has his Jazz , he is fine. he report that he notices that his memory is not as good as it used to be but he reports that he can still play the piano and his bass.   Patient denies symptoms suggestive of major depression, acute psychosis or acute meredith. He denies current suicidal ideations, intent or plan . he denies current use of illicit drugs or alcohol.     Collateral information was obtained from patient's son Mr Stearns([phone]). he reports that in the last 1 month, patient has been seeing people in his room , the furniture moving , and items in his home talking to him he confirms that patient has been having memory problems  and that the visual hallucinations occur mostly at night . he report that patient calls him every night and would often call the police who would come but not find anyone in patient's house. He reports that it has been getting worse since his mother passed away , however he reports that he is unsure if patient already had this problems and both patient and his mother hid them from him. he reports that patient can become irritable but does not become agitated or aggressive . he reports that he realizes that it is becoming more difficult for patient to live alone.

## 2019-10-15 NOTE — BEHAVIORAL HEALTH ASSESSMENT NOTE - NSBHCONSULTRECOMMENDOTHER_PSY_A_CORE FT
Recommend higher level of care like a home health aide Recommend higher level of care like a home health aide  Consider neurology consult for Dementia

## 2019-10-15 NOTE — BEHAVIORAL HEALTH ASSESSMENT NOTE - RISK ASSESSMENT
Low Acute Suicide Risk Risks factors for suicide in this patient is his being in an older age group, multiple medical morbidities, however these are mitigated by the fact that patient has no current mood symptoms, has no history of suicide, has no current suicidal ideations  , does not  have a primary psychiatric illness has good support symptom in his family

## 2019-10-15 NOTE — BEHAVIORAL HEALTH ASSESSMENT NOTE - NSBHCHARTREVIEWVS_PSY_A_CORE FT
Vital Signs Last 24 Hrs  T(C): 35.8 (15 Oct 2019 08:06), Max: 36.3 (15 Oct 2019 00:29)  T(F): 96.4 (15 Oct 2019 08:06), Max: 97.4 (15 Oct 2019 00:29)  HR: 54 (15 Oct 2019 08:06) (54 - 56)  BP: 142/66 (15 Oct 2019 08:06) (88/53 - 142/66)  BP(mean): --  RR: 18 (15 Oct 2019 08:06) (18 - 18)  SpO2: 100% (15 Oct 2019 08:06) (100% - 100%)

## 2019-10-15 NOTE — BEHAVIORAL HEALTH ASSESSMENT NOTE - SUMMARY
Mr Stearns is an 87 year old man with no history of  a psychiatric illness who was admitted to the medical services for altered mental status. Psychiatry consult was called for the evaluation of visual hallucinations.  Patient appears to have some cognitive impairment , and based on his age , his progressive memory loss , the impairment in his attention, it is likely that patient may have a dementia. The visual hallucinations may be in the context of a delirium superimposed on an underling dementia or sundowning  which will cause altered mental status  and confusion  especially in the latter parts of the day in most geriatric patients. Patient also does not seem to have any associated behavioral disturbance aside from calling his son and the police for re-assurance about his visual hallucinations. patient has no acute symptoms of psychosis , meredith nor is he depressed. He appears to have appropriate feelings of dysphoria in the context of the recent passing of his wife . Patient appears to be coping with this severe stressor by finding solace in the music that he loves.   At this time, patient is not considered an imminent danger to himself or others and does not need inpatient psychiatric hospitalization. He may benefit from a higher level of care given his cognitive impairment however patient will benefit from behavioral interventions and environmental modifications to help him with his orientation.

## 2019-10-15 NOTE — BEHAVIORAL HEALTH ASSESSMENT NOTE - SUICIDE PROTECTIVE FACTORS
Supportive social network of family or friends/Ability to cope with stress/Identifies reasons for living

## 2019-10-16 LAB
RPR SER-TITR: SIGNIFICANT CHANGE UP
RPR SERPL-ACNC: SIGNIFICANT CHANGE UP
T PALLIDUM AB TITR SER: POSITIVE
T PALLIDUM IGG SER QL IF: REACTIVE

## 2019-10-16 RX ADMIN — ENOXAPARIN SODIUM 40 MILLIGRAM(S): 100 INJECTION SUBCUTANEOUS at 11:45

## 2019-10-16 NOTE — CHART NOTE - NSCHARTNOTEFT_GEN_A_CORE
Attempted to place an IV line for Mr. Hyde.  Informed that he needs the IV line for the sake of getting IV fluids to get him to be more hydrated.  He blatantly refused. I explained him that we need an IV line in case of emergency, he still refused.

## 2019-10-16 NOTE — PROGRESS NOTE ADULT - SUBJECTIVE AND OBJECTIVE BOX
Patient was seen and examined. Spoke with HO and aide at bedside. Chart reviewed.  No events overnight.  Vital Signs Last 24 Hrs  T(F): 96.9 (16 Oct 2019 04:48), Max: 96.9 (16 Oct 2019 04:48)  HR: 67 (16 Oct 2019 04:48) (66 - 67)  BP: 153/71 (16 Oct 2019 04:48) (153/71 - 154/70)  SpO2: 95% (16 Oct 2019 09:17) (95% - 98%)  MEDICATIONS  (STANDING):  amLODIPine   Tablet 5 milliGRAM(s) Oral daily  atorvastatin 20 milliGRAM(s) Oral at bedtime  calcium carbonate 1250 mG  + Vitamin D (OsCal 500 + D) 1 Tablet(s) Oral daily  donepezil 5 milliGRAM(s) Oral at bedtime  enoxaparin Injectable 40 milliGRAM(s) SubCutaneous daily  hydrochlorothiazide 12.5 milliGRAM(s) Oral daily  multivitamin 1 Tablet(s) Oral daily  tamsulosin 0.4 milliGRAM(s) Oral at bedtime    MEDICATIONS  (PRN):    Labs:                        11.3   5.05  )-----------( 237      ( 15 Oct 2019 08:32 )             34.6                         11.2   4.79  )-----------( 267      ( 14 Oct 2019 15:20 )             34.2     15 Oct 2019 08:32    140    |  103    |  16     ----------------------------<  97     4.6     |  25     |  0.9    14 Oct 2019 15:20    144    |  106    |  18     ----------------------------<  123    4.5     |  27     |  1.0      Ca    9.3        15 Oct 2019 08:32  Ca    9.5        14 Oct 2019 15:20  Mg     2.0       15 Oct 2019 08:32    TPro  7.3    /  Alb  4.3    /  TBili  0.5    /  DBili  x      /  AST  23     /  ALT  14     /  AlkPhos  47     15 Oct 2019 08:32  TPro  7.0    /  Alb  4.2    /  TBili  0.3    /  DBili  x      /  AST  23     /  ALT  13     /  AlkPhos  43     14 Oct 2019 15:20      Urinalysis Basic - ( 14 Oct 2019 14:50 )    Color: Light Yellow / Appearance: Clear / S.021 / pH: x  Gluc: x / Ketone: Negative  / Bili: Negative / Urobili: <2 mg/dL   Blood: x / Protein: 30 mg/dL / Nitrite: Negative   Leuk Esterase: Negative / RBC: 10 /HPF / WBC 0 /HPF   Sq Epi: x / Non Sq Epi: 0 /HPF / Bacteria: Negative        A/P:  88 yo man with dementia- hallucinations    psych f/u    case managment- DC planning to SNF    meds as prescribed    aspiration precautions    fall precautions  DVT prophylaxis  Decubitus prevention- all measures as per RN protocol  Please call or text me with any questions or updates

## 2019-10-16 NOTE — PROGRESS NOTE ADULT - SUBJECTIVE AND OBJECTIVE BOX
SUBJECTIVE:    Patient is a 87y old  Male who presents with a chief complaint of Hallucinations (16 Oct 2019 16:14)    Currently admitted to medicine with the primary diagnosis of Hallucinations     Today is hospital day 2d.     Patient was seen at bedside this AM.  I was able to communicate with the patient as to how he is feeling.  He didn't report any acute symptoms such as chest pain, sob, abdominal pain.  He did seem dehydrated when I saw him.    He called out to "Remington", which is presumed to be his wife.    PAST MEDICAL & SURGICAL HISTORY  PAST MEDICAL & SURGICAL HISTORY:  H/O hypokalemia  Dyslipidemia  Hypertension  Prostate cancer      ALLERGIES:  No Known Allergies    MEDICATIONS:  STANDING MEDICATIONS  amLODIPine   Tablet 5 milliGRAM(s) Oral daily  atorvastatin 20 milliGRAM(s) Oral at bedtime  calcium carbonate 1250 mG  + Vitamin D (OsCal 500 + D) 1 Tablet(s) Oral daily  donepezil 5 milliGRAM(s) Oral at bedtime  enoxaparin Injectable 40 milliGRAM(s) SubCutaneous daily  hydrochlorothiazide 12.5 milliGRAM(s) Oral daily  multivitamin 1 Tablet(s) Oral daily  tamsulosin 0.4 milliGRAM(s) Oral at bedtime    PRN MEDICATIONS    VITALS:   T(F): 96.9  HR: 69  BP: 144/67  RR: 18  SpO2: 95%    LABS:                        11.3   5.05  )-----------( 237      ( 15 Oct 2019 08:32 )             34.6     10-15    140  |  103  |  16  ----------------------------<  97  4.6   |  25  |  0.9    Ca    9.3      15 Oct 2019 08:32  Mg     2.0     10-15    TPro  7.3  /  Alb  4.3  /  TBili  0.5  /  DBili  x   /  AST  23  /  ALT  14  /  AlkPhos  47  10-15                  RADIOLOGY  < from: CT Head No Cont (10.14.19 @ 16:06) >  IMPRESSION:    Cerebral and cerebellar atrophy.    < end of copied text >      PHYSICAL EXAM:  GEN: No acute distress  HEENT: NCAT. Dry mucus membranes.    LUNGS: Clear to auscultation bilaterally   HEART: RRR. no murmurs  ABD: Soft, non-tender, non-distended  EXT: no edema  NEURO: AAOX2.  Patient was able to state who he is, where is, what month and year it is.   SKIN: Very dry and turgid diffusely     Assessment and Plan    #Visual Hallucinations 2/2 Delirium likely due to Dehydration   CTH revealed cerebellar and cerebral atrophy-->Not significant in a patient of this age   Patient refused IV line for IVF-->Encouraged more PO intake   E-Lytes do not reflected dehydration despite fact that patient looked dry clinically (Normal Na-->Less likely that patient has free water loss)   B12, Folate, TSH, RPR all within normal limits  Psych recommendations: Requires high level of care than at home.   Likely due to underlying delirium or the patient is sundowning.  But symptoms are not of a true psychiatric etiology   Currently working to set up home w/home care to provide better care for the patient.  Son does not want SNF   Patient currently on 1:1 sit w/PCA     # Hyperlipidemia:   c/w statin   # Hypertension:   c/w amlodipine and HCTZ    # Metastatic prostate cancer -  Patient was on Zytiga however possibly stopped recently. Will clarify as to why he stopped.  c/w tamsulosin     GI PPX: Not indicated    DVT PPX: Lovenox   Dispo: Home w/Home care SUBJECTIVE:    Patient is a 87y old  Male who presents with a chief complaint of Hallucinations (16 Oct 2019 16:14)    Currently admitted to medicine with the primary diagnosis of Hallucinations     Today is hospital day 2d.     Patient was seen at bedside this AM.  I was able to communicate with the patient as to how he is feeling.  He didn't report any acute symptoms such as chest pain, sob, abdominal pain.  He did seem dehydrated when I saw him.    He called out to "Remington", which is presumed to be his wife.    PAST MEDICAL & SURGICAL HISTORY  PAST MEDICAL & SURGICAL HISTORY:  H/O hypokalemia  Dyslipidemia  Hypertension  Prostate cancer      ALLERGIES:  No Known Allergies    MEDICATIONS:  STANDING MEDICATIONS  amLODIPine   Tablet 5 milliGRAM(s) Oral daily  atorvastatin 20 milliGRAM(s) Oral at bedtime  calcium carbonate 1250 mG  + Vitamin D (OsCal 500 + D) 1 Tablet(s) Oral daily  donepezil 5 milliGRAM(s) Oral at bedtime  enoxaparin Injectable 40 milliGRAM(s) SubCutaneous daily  hydrochlorothiazide 12.5 milliGRAM(s) Oral daily  multivitamin 1 Tablet(s) Oral daily  tamsulosin 0.4 milliGRAM(s) Oral at bedtime    PRN MEDICATIONS    VITALS:   T(F): 96.9  HR: 69  BP: 144/67  RR: 18  SpO2: 95%    LABS:                        11.3   5.05  )-----------( 237      ( 15 Oct 2019 08:32 )             34.6     10-15    140  |  103  |  16  ----------------------------<  97  4.6   |  25  |  0.9    Ca    9.3      15 Oct 2019 08:32  Mg     2.0     10-15    TPro  7.3  /  Alb  4.3  /  TBili  0.5  /  DBili  x   /  AST  23  /  ALT  14  /  AlkPhos  47  10-15                  RADIOLOGY  < from: CT Head No Cont (10.14.19 @ 16:06) >  IMPRESSION:    Cerebral and cerebellar atrophy.    < end of copied text >      PHYSICAL EXAM:  GEN: No acute distress  HEENT: NCAT. Dry mucus membranes.    LUNGS: Clear to auscultation bilaterally   HEART: RRR. no murmurs  ABD: Soft, non-tender, non-distended  EXT: no edema  NEURO: AAOX2.  Patient was able to state who he is, where is, what month and year it is.   SKIN: Very dry and turgid diffusely     Assessment and Plan    #Visual Hallucinations 2/2 Delirium likely due to Dehydration   CTH revealed cerebellar and cerebral atrophy-->Not significant in a patient of this age   Patient refused IV line for IVF-->Encouraged more PO intake   E-Lytes do not reflected dehydration despite fact that patient looked dry clinically (Normal Na-->Less likely that patient has free water loss)   B12, Folate, TSH,  all within normal limits  Psych recommendations: Requires high level of care than at home.   Likely due to underlying delirium or the patient is sundowning.  But symptoms are not of a true psychiatric etiology   Currently working to set up home w/home care to provide better care for the patient.  Son does not want SNF   Patient currently on 1:1 sit w/PCA     # Hyperlipidemia:   c/w statin   # Hypertension:   c/w amlodipine and HCTZ    # Metastatic prostate cancer -  Patient was on Zytiga however possibly stopped recently. Will clarify as to why he stopped.  c/w tamsulosin     GI PPX: Not indicated    DVT PPX: Lovenox   Dispo: Home w/Home care

## 2019-10-16 NOTE — CONSULT NOTE ADULT - ASSESSMENT
IMPRESSION: Rehab of    dementia, hallucinations, irritability    PRECAUTIONS: [  ] Cardiac  [  ] Respiratory  [  ] Seizures [  ] Contact Isolation  [  ] Droplet Isolation  [  ] Other    Weight Bearing Status:     RECOMMENDATION:     He refused to alk with me and didn't want blood work done. It appears atypical antipsychotic should be considered.   there are considerable halfway care needs.    Out of Bed to Chair     DVT/Decubiti Prophylaxis    REHAB PLAN:     [ xx  ] Bedside P/T 3-5 times a week   [   ]   Bedside O/T  2-3 times a week             [   ] No Rehab Therapy Indicated                   [   ]  Speech Therapy   Conditioning/ROM                                    ADL  Bed Mobility                                               Conditioning/ROM  Transfers                                                     Bed Mobility  Sitting /Standing Balance                         Transfers                                        Gait Training                                               Sitting/Standing Balance  Stair Training [   ]Applicable                    Home equipment Eval                                                                        Splinting  [   ] Only      GOALS:   ADL   [   ]   Independent                    Transfers  [ x  ] Independent                          Ambulation  [x   ] Independent     [  x  ] With device                            [ x  ]  CG                                                         [   ]  CG                                                                  [   ] CG                            [    ] Min A                                                   [   ] Min A                                                              [   ] Min  A          DISCHARGE PLAN:   [   ]  Good candidate for Intensive Rehabilitation/Hospital based-[facility]                                             Will tolerate 3hrs Intensive Rehab Daily                                       [  xx  ]  Short Term Rehab in Skilled Nursing Facility                                       [    ]  Home with Outpatient or VN services                                         [    ]  Possible Candidate for Intensive Hospital based Rehab

## 2019-10-16 NOTE — CONSULT NOTE ADULT - SUBJECTIVE AND OBJECTIVE BOX
HPI:  CC: Hallucinations     87 year old male   PMH: Metastatic prostate cancer, hypertension, dyslipidemia, hypokalemia    Past Surgical History: No known past surgical history     History of Present Illness goes back to 9/2019 when patient suddenly started having altered behaviour per the son. He was more angry and defiant than usual. The patient then started having hallucinations witnessed by the son as he was called by the patient saying there is a lady in his bed, described as a dressed skeleton however she would not talk. He would also see children playing in his living room silently without responding to him. He denies hearing voices when no one was present. He says he believes these people were trying to steal thing from his home and take his home which is worrying him the most to the point he called the police 3 times. These hallucinations occur mainly at nighttime.   Of note, patient's wife passed away in 6/2019 after long illness and per the son the patient has not been the same since.     In the ED, vitals were stable, labs were unremarkable, UA was negative and a CT scan showed cerebral and cerebellar atrophy. (14 Oct 2019 19:49)      PAST MEDICAL & SURGICAL HISTORY:  H/O hypokalemia  Dyslipidemia  Hypertension  Prostate cancer      Hospital Course:  He refused to walk with me and he reports he does not feel like walking with me at this time. it was clear to staff that he was having hallucinations today. He is quite irritable with hospital staff.  TODAY'S SUBJECTIVE & REVIEW OF SYMPTOMS:     Constitutional WNL   Cardio WNL   Resp WNL   GI WNL  Heme WNL  Endo WNL  Skin WNL  MSK WNL  Neuro WNL  Cognitive WNL  Psych WNL      MEDICATIONS  (STANDING):  amLODIPine   Tablet 5 milliGRAM(s) Oral daily  atorvastatin 20 milliGRAM(s) Oral at bedtime  calcium carbonate 1250 mG  + Vitamin D (OsCal 500 + D) 1 Tablet(s) Oral daily  donepezil 5 milliGRAM(s) Oral at bedtime  enoxaparin Injectable 40 milliGRAM(s) SubCutaneous daily  hydrochlorothiazide 12.5 milliGRAM(s) Oral daily  multivitamin 1 Tablet(s) Oral daily  tamsulosin 0.4 milliGRAM(s) Oral at bedtime    MEDICATIONS  (PRN):      FAMILY HISTORY:      Allergies    No Known Allergies    Intolerances        SOCIAL HISTORY:    [  ] Etoh  [  ] Smoking  [  ] Substance abuse     Home Environment:  [  ] Home Alone  [  ] Lives with Family  [  ] Home Health Aid    Dwelling:  [  ] Apartment  [  ] Private House  [  ] Adult Home  [  ] Skilled Nursing Facility      [  ] Short Term  [  ] Long Term  [  ] Stairs       Elevator [  ]    FUNCTIONAL STATUS PTA: (Check all that apply)  Ambulation: [   ]Independent    [  ] Dependent     [  ] Non-Ambulatory  Assistive Device: [  ] SA Cane  [  ]  Q Cane  [  ] Walker  [  ]  Wheelchair  ADL : [  ] Independent  [  ]  Dependent       Vital Signs Last 24 Hrs  T(C): 36.1 (16 Oct 2019 14:15), Max: 36.1 (16 Oct 2019 04:48)  T(F): 96.9 (16 Oct 2019 14:15), Max: 96.9 (16 Oct 2019 04:48)  HR: 69 (16 Oct 2019 14:15) (67 - 69)  BP: 144/67 (16 Oct 2019 14:15) (144/67 - 153/71)  BP(mean): --  RR: 18 (16 Oct 2019 14:15) (18 - 18)  SpO2: 95% (16 Oct 2019 09:17) (95% - 95%)      PHYSICAL EXAM: Alert & Oriented X3  GENERAL: NAD, well-groomed, well-developed  HEAD:  Atraumatic, Normocephalic  EYES: EOMI, PERRLA, conjunctiva and sclera clear  NECK: Supple, No JVD, Normal thyroid  CHEST/LUNG: Clear to percussion bilaterally; No rales, rhonchi, wheezing, or rubs  HEART: Regular rate and rhythm; No murmurs, rubs, or gallops  ABDOMEN: Soft, Nontender, Nondistended; Bowel sounds present  EXTREMITIES:  2+ Peripheral Pulses, No clubbing, cyanosis, or edema    NERVOUS SYSTEM:  Cranial Nerves 2-12 intact [  ] Abnormal  [  ]  ROM: WFL all extremities [  ]  Abnormal [  ]  Motor Strength: WFL all extremities  [x  ]  Abnormal [  ]  Sensation: intact to light touch [  ] Abnormal [  ]  Reflexes: Symmetric [  ]  Abnormal [  ]    FUNCTIONAL STATUS:  Bed Mobility: Independent [  ]  Supervision [  ]  Needs Assistance [  ]  N/A [  ]  Transfers: Independent [  ]  Supervision [  ]  Needs Assistance [  ]  N/A [  ]   Ambulation: Independent [  ]  Supervision [  ]  Needs Assistance [  ]  N/A [  ]  ADL: Independent [  ] Requires Assistance [  ] N/A [  ]      LABS:                        11.3   5.05  )-----------( 237      ( 15 Oct 2019 08:32 )             34.6     10-15    140  |  103  |  16  ----------------------------<  97  4.6   |  25  |  0.9    Ca    9.3      15 Oct 2019 08:32  Mg     2.0     10-15    TPro  7.3  /  Alb  4.3  /  TBili  0.5  /  DBili  x   /  AST  23  /  ALT  14  /  AlkPhos  47  10-15          RADIOLOGY & ADDITIONAL STUDIES:    Assesment: negative...

## 2019-10-17 LAB
ALBUMIN SERPL ELPH-MCNC: 3.7 G/DL — SIGNIFICANT CHANGE UP (ref 3.5–5.2)
ALP SERPL-CCNC: 47 U/L — SIGNIFICANT CHANGE UP (ref 30–115)
ALT FLD-CCNC: 18 U/L — SIGNIFICANT CHANGE UP (ref 0–41)
ANION GAP SERPL CALC-SCNC: 14 MMOL/L — SIGNIFICANT CHANGE UP (ref 7–14)
AST SERPL-CCNC: 50 U/L — HIGH (ref 0–41)
BASOPHILS # BLD AUTO: 0.05 K/UL — SIGNIFICANT CHANGE UP (ref 0–0.2)
BASOPHILS NFR BLD AUTO: 0.9 % — SIGNIFICANT CHANGE UP (ref 0–1)
BILIRUB SERPL-MCNC: 0.5 MG/DL — SIGNIFICANT CHANGE UP (ref 0.2–1.2)
BUN SERPL-MCNC: 14 MG/DL — SIGNIFICANT CHANGE UP (ref 10–20)
CALCIUM SERPL-MCNC: 8.6 MG/DL — SIGNIFICANT CHANGE UP (ref 8.5–10.1)
CHLORIDE SERPL-SCNC: 106 MMOL/L — SIGNIFICANT CHANGE UP (ref 98–110)
CO2 SERPL-SCNC: 23 MMOL/L — SIGNIFICANT CHANGE UP (ref 17–32)
CREAT SERPL-MCNC: 1 MG/DL — SIGNIFICANT CHANGE UP (ref 0.7–1.5)
EOSINOPHIL # BLD AUTO: 0.04 K/UL — SIGNIFICANT CHANGE UP (ref 0–0.7)
EOSINOPHIL NFR BLD AUTO: 0.7 % — SIGNIFICANT CHANGE UP (ref 0–8)
GLUCOSE SERPL-MCNC: 70 MG/DL — SIGNIFICANT CHANGE UP (ref 70–99)
HCT VFR BLD CALC: 36.1 % — LOW (ref 42–52)
HGB BLD-MCNC: 12.1 G/DL — LOW (ref 14–18)
IMM GRANULOCYTES NFR BLD AUTO: 0.2 % — SIGNIFICANT CHANGE UP (ref 0.1–0.3)
LYMPHOCYTES # BLD AUTO: 1.65 K/UL — SIGNIFICANT CHANGE UP (ref 1.2–3.4)
LYMPHOCYTES # BLD AUTO: 29.1 % — SIGNIFICANT CHANGE UP (ref 20.5–51.1)
MAGNESIUM SERPL-MCNC: 2.2 MG/DL — SIGNIFICANT CHANGE UP (ref 1.8–2.4)
MCHC RBC-ENTMCNC: 30 PG — SIGNIFICANT CHANGE UP (ref 27–31)
MCHC RBC-ENTMCNC: 33.5 G/DL — SIGNIFICANT CHANGE UP (ref 32–37)
MCV RBC AUTO: 89.4 FL — SIGNIFICANT CHANGE UP (ref 80–94)
MONOCYTES # BLD AUTO: 0.53 K/UL — SIGNIFICANT CHANGE UP (ref 0.1–0.6)
MONOCYTES NFR BLD AUTO: 9.3 % — SIGNIFICANT CHANGE UP (ref 1.7–9.3)
NEUTROPHILS # BLD AUTO: 3.39 K/UL — SIGNIFICANT CHANGE UP (ref 1.4–6.5)
NEUTROPHILS NFR BLD AUTO: 59.8 % — SIGNIFICANT CHANGE UP (ref 42.2–75.2)
NRBC # BLD: 0 /100 WBCS — SIGNIFICANT CHANGE UP (ref 0–0)
PLATELET # BLD AUTO: 262 K/UL — SIGNIFICANT CHANGE UP (ref 130–400)
POTASSIUM SERPL-MCNC: 4.3 MMOL/L — SIGNIFICANT CHANGE UP (ref 3.5–5)
POTASSIUM SERPL-SCNC: 4.3 MMOL/L — SIGNIFICANT CHANGE UP (ref 3.5–5)
PROT SERPL-MCNC: 6.6 G/DL — SIGNIFICANT CHANGE UP (ref 6–8)
RBC # BLD: 4.04 M/UL — LOW (ref 4.7–6.1)
RBC # FLD: 13.7 % — SIGNIFICANT CHANGE UP (ref 11.5–14.5)
SODIUM SERPL-SCNC: 143 MMOL/L — SIGNIFICANT CHANGE UP (ref 135–146)
WBC # BLD: 5.67 K/UL — SIGNIFICANT CHANGE UP (ref 4.8–10.8)
WBC # FLD AUTO: 5.67 K/UL — SIGNIFICANT CHANGE UP (ref 4.8–10.8)

## 2019-10-17 RX ORDER — SODIUM CHLORIDE 9 MG/ML
1000 INJECTION, SOLUTION INTRAVENOUS
Refills: 0 | Status: DISCONTINUED | OUTPATIENT
Start: 2019-10-17 | End: 2019-10-29

## 2019-10-17 RX ADMIN — Medication 1 TABLET(S): at 12:41

## 2019-10-17 RX ADMIN — ATORVASTATIN CALCIUM 20 MILLIGRAM(S): 80 TABLET, FILM COATED ORAL at 22:13

## 2019-10-17 RX ADMIN — TAMSULOSIN HYDROCHLORIDE 0.4 MILLIGRAM(S): 0.4 CAPSULE ORAL at 22:13

## 2019-10-17 RX ADMIN — ENOXAPARIN SODIUM 40 MILLIGRAM(S): 100 INJECTION SUBCUTANEOUS at 12:29

## 2019-10-17 RX ADMIN — SODIUM CHLORIDE 75 MILLILITER(S): 9 INJECTION, SOLUTION INTRAVENOUS at 16:08

## 2019-10-17 RX ADMIN — DONEPEZIL HYDROCHLORIDE 5 MILLIGRAM(S): 10 TABLET, FILM COATED ORAL at 22:13

## 2019-10-17 NOTE — CONSULT NOTE ADULT - ASSESSMENT
87y old  Male who presents with a chief complaint of Hallucinations.    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

## 2019-10-17 NOTE — PHYSICAL THERAPY INITIAL EVALUATION ADULT - GENERAL OBSERVATIONS, REHAB EVAL
Pt encountered in bed, +1:1 PCA, +Dr. Kelley at bs, in NAD but lethagic / eyes closed and slow to arouse

## 2019-10-17 NOTE — PHYSICAL THERAPY INITIAL EVALUATION ADULT - LEVEL OF INDEPENDENCE: BED TO CHAIR, REHAB EVAL
Bed to chair transfer deferred 2/2 pt lethargic t/o session, dependent for bed mobility. Dr. Kelley aware

## 2019-10-17 NOTE — PHYSICAL THERAPY INITIAL EVALUATION ADULT - ASR EQUIP NEEDS DISCH PT EVAL
Unable to fully assess pt's anticipated equipment needs but based on presentation today pt will require DME for ADLs.

## 2019-10-17 NOTE — PHYSICAL THERAPY INITIAL EVALUATION ADULT - RANGE OF MOTION EXAMINATION, REHAB EVAL
B/L UE's ~1/2 OH ROM; able to  with B/L hands to shake my hands; LE's ~1/2 ROM, movement slow and rigid

## 2019-10-17 NOTE — PHYSICAL THERAPY INITIAL EVALUATION ADULT - PERTINENT HX OF CURRENT PROBLEM, REHAB EVAL
Pt is an 87 year old male Currently admitted to medicine with the primary diagnosis of Hallucinations.

## 2019-10-17 NOTE — PHYSICAL THERAPY INITIAL EVALUATION ADULT - ADDITIONAL COMMENTS
Pt A&O x 1-2, very lethargic throughout session opting to keep eyes closed unless prompted. Pt reports to PT that he lives at home, does not use an assistive device, has 4-5 steps outside to enter house and 12 steps inside the house, but I am unsure how factual this information is.  Pt appeared confused t/o session, often saying he was "standing" or "crawling" when in fact he was in bed.

## 2019-10-17 NOTE — CONSULT NOTE ADULT - SUBJECTIVE AND OBJECTIVE BOX
OKSANA BRANHAM  87y, Male  Allergy: No Known Allergies      HPI:  CC: Hallucinations     87 year old male   PMH: Metastatic prostate cancer, hypertension, dyslipidemia, hypokalemia    Past Surgical History: No known past surgical history     History of Present Illness goes back to 9/2019 when patient suddenly started having altered behaviour per the son. He was more angry and defiant than usual. The patient then started having hallucinations witnessed by the son as he was called by the patient saying there is a lady in his bed, described as a dressed skeleton however she would not talk. He would also see children playing in his living room silently without responding to him. He denies hearing voices when no one was present. He says he believes these people were trying to steal thing from his home and take his home which is worrying him the most to the point he called the police 3 times. These hallucinations occur mainly at nighttime.   Of note, patient's wife passed away in 6/2019 after long illness and per the son the patient has not been the same since.     In the ED, vitals were stable, labs were unremarkable, UA was negative and a CT scan showed cerebral and cerebellar atrophy. (14 Oct 2019 19:49)    Pt ambulated into the ER ( as per kids )    FAMILY HISTORY:    PAST MEDICAL & SURGICAL HISTORY:  H/O hypokalemia  Dyslipidemia  Hypertension  Prostate cancer        VITALS:  T(F): 96.5, Max: 96.7 (10-16-19 @ 20:59)  HR: 61  BP: 117/52  RR: 18Vital Signs Last 24 Hrs  T(C): 35.8 (17 Oct 2019 13:19), Max: 35.9 (16 Oct 2019 20:59)  T(F): 96.5 (17 Oct 2019 13:19), Max: 96.7 (16 Oct 2019 20:59)  HR: 61 (17 Oct 2019 13:19) (61 - 74)  BP: 117/52 (17 Oct 2019 13:19) (117/52 - 139/67)  BP(mean): --  RR: 18 (17 Oct 2019 13:19) (18 - 18)  SpO2: 98% (17 Oct 2019 07:45) (95% - 98%)    TESTS & MEASUREMENTS:                        12.1   5.67  )-----------( 262      ( 17 Oct 2019 06:19 )             36.1     10-17    143  |  106  |  14  ----------------------------<  70  4.3   |  23  |  1.0    Ca    8.6      17 Oct 2019 06:19  Mg     2.2     10-17    TPro  6.6  /  Alb  3.7  /  TBili  0.5  /  DBili  x   /  AST  50<H>  /  ALT  18  /  AlkPhos  47  10-17    LIVER FUNCTIONS - ( 17 Oct 2019 06:19 )  Alb: 3.7 g/dL / Pro: 6.6 g/dL / ALK PHOS: 47 U/L / ALT: 18 U/L / AST: 50 U/L / GGT: x                   RADIOLOGY & ADDITIONAL TESTS:    ANTIBIOTICS:

## 2019-10-17 NOTE — PHYSICAL THERAPY INITIAL EVALUATION ADULT - SITTING BALANCE: STATIC
Pt able to tolerate supported sitting without complaint for 5+ minutes but required significant assistance to maintain upright sitting. Pt able to  bed rails when prompted but still unable to maintain sitting posture without help./poor minus

## 2019-10-17 NOTE — PROGRESS NOTE ADULT - SUBJECTIVE AND OBJECTIVE BOX
SUBJECTIVE:    Patient is a 87y old  Male who presents with a chief complaint of Hallucinations (16 Oct 2019 18:13)    Currently admitted to medicine with the primary diagnosis of Hallucinations     Today is hospital day 3d.     Patient is still only oriented to himself and his date of birth.  When I ask him about his whereabouts, he says he is in [place].  He is not able to answer my questions coherently.  No acute overnight events.      PAST MEDICAL & SURGICAL HISTORY  PAST MEDICAL & SURGICAL HISTORY:  H/O hypokalemia  Dyslipidemia  Hypertension  Prostate cancer      ALLERGIES:  No Known Allergies    MEDICATIONS:  STANDING MEDICATIONS  amLODIPine   Tablet 5 milliGRAM(s) Oral daily  atorvastatin 20 milliGRAM(s) Oral at bedtime  calcium carbonate 1250 mG  + Vitamin D (OsCal 500 + D) 1 Tablet(s) Oral daily  donepezil 5 milliGRAM(s) Oral at bedtime  enoxaparin Injectable 40 milliGRAM(s) SubCutaneous daily  hydrochlorothiazide 12.5 milliGRAM(s) Oral daily  multivitamin 1 Tablet(s) Oral daily  tamsulosin 0.4 milliGRAM(s) Oral at bedtime    PRN MEDICATIONS    VITALS:   T(F): 96.7  HR: 61  BP: 139/67  RR: 18  SpO2: 98%    LABS:                        12.1   5.67  )-----------( 262      ( 17 Oct 2019 06:19 )             36.1     10-17    143  |  106  |  14  ----------------------------<  70  4.3   |  23  |  1.0    Ca    8.6      17 Oct 2019 06:19  Mg     2.2     10-17    TPro  6.6  /  Alb  3.7  /  TBili  0.5  /  DBili  x   /  AST  50<H>  /  ALT  18  /  AlkPhos  47  10-17                  RADIOLOGY  < from: CT Head No Cont (10.14.19 @ 16:06) >  IMPRESSION:    Cerebral and cerebellar atrophy.    < end of copied text >      PHYSICAL EXAM:  GEN: No acute distress  HEENT: NCAT. Dry mucus membranes.    LUNGS: Clear to auscultation bilaterally   HEART: RRR. no murmurs  ABD: Soft, non-tender, non-distended  EXT: no edema  NEURO: AAOX1.  Patient was able to state who he is only.  But not oriented to time and place.   SKIN: Very dry and turgid diffusely     Assessment and Plan    #Visual Hallucinations 2/2 Delirium likely due to Dehydration vs. Neurosyphilis   CTH revealed cerebellar and cerebral atrophy-->Not significant in a patient of this age   Patient refused IV line for IVF-->Encouraged more PO intake   E-Lytes do not reflected dehydration despite fact that patient looked dry clinically (Normal Na-->Less likely that patient has free water loss)   B12, Folate, TSH wnl.   RPR negative, but FTA-ABS positive-->  Psych recommendations: Requires high level of care than at home.   Likely due to underlying delirium or the patient is sundowning.  But symptoms are not of a true psychiatric etiology   Currently working to set up home w/home care to provide better care for the patient.  Son does not want SNF   Patient currently on 1:1 sit w/PCA     # Hyperlipidemia:   c/w statin   # Hypertension:   c/w amlodipine and HCTZ    # Metastatic prostate cancer -  Patient was on Zytiga however possibly stopped recently. Will clarify as to why he stopped.  c/w tamsulosin     GI PPX: Not indicated    DVT PPX: Lovenox   Dispo: Home w/Home care SUBJECTIVE:    Patient is a 87y old  Male who presents with a chief complaint of Hallucinations (16 Oct 2019 18:13)    Currently admitted to medicine with the primary diagnosis of Hallucinations     Today is hospital day 3d.     Patient is still only oriented to himself and his date of birth.  When I ask him about his whereabouts, he says he is in [place].  He is not able to answer my questions coherently.  No acute overnight events.      PAST MEDICAL & SURGICAL HISTORY  PAST MEDICAL & SURGICAL HISTORY:  H/O hypokalemia  Dyslipidemia  Hypertension  Prostate cancer      ALLERGIES:  No Known Allergies    MEDICATIONS:  STANDING MEDICATIONS  amLODIPine   Tablet 5 milliGRAM(s) Oral daily  atorvastatin 20 milliGRAM(s) Oral at bedtime  calcium carbonate 1250 mG  + Vitamin D (OsCal 500 + D) 1 Tablet(s) Oral daily  donepezil 5 milliGRAM(s) Oral at bedtime  enoxaparin Injectable 40 milliGRAM(s) SubCutaneous daily  hydrochlorothiazide 12.5 milliGRAM(s) Oral daily  multivitamin 1 Tablet(s) Oral daily  tamsulosin 0.4 milliGRAM(s) Oral at bedtime    PRN MEDICATIONS    VITALS:   T(F): 96.7  HR: 61  BP: 139/67  RR: 18  SpO2: 98%    LABS:                        12.1   5.67  )-----------( 262      ( 17 Oct 2019 06:19 )             36.1     10-17    143  |  106  |  14  ----------------------------<  70  4.3   |  23  |  1.0    Ca    8.6      17 Oct 2019 06:19  Mg     2.2     10-17    TPro  6.6  /  Alb  3.7  /  TBili  0.5  /  DBili  x   /  AST  50<H>  /  ALT  18  /  AlkPhos  47  10-17                  RADIOLOGY  < from: CT Head No Cont (10.14.19 @ 16:06) >  IMPRESSION:    Cerebral and cerebellar atrophy.    < end of copied text >      PHYSICAL EXAM:  GEN: No acute distress  HEENT: NCAT. Dry mucus membranes.    LUNGS: Clear to auscultation bilaterally   HEART: RRR. no murmurs  ABD: Soft, non-tender, non-distended  EXT: no edema  NEURO: AAOX1.  Patient was able to state who he is only.  But not oriented to time and place.     Assessment and Plan    #Visual Hallucinations 2/2 Delirium likely due to Dehydration vs. Neurosyphilis   CTH revealed cerebellar and cerebral atrophy-->Not significant in a patient of this age   E-Lytes do not reflected dehydration despite fact that patient looked dry clinically (Normal Na-->Less likely that patient has free water loss)   B12, Folate, TSH wnl.   RPR negative, but FTA-ABS positive-->Unknown whether or not patient was treated for syphilis in the past.  ID consulted.   Psych recommendations: Requires high level of care than at home.   Likely due to underlying delirium or the patient is sundowning.  But symptoms are not of a true psychiatric etiology   Currently working to set up home w/home care to provide better care for the patient.  Son does not want SNF   Patient currently on 1:1 sit w/PCA     # Hyperlipidemia:   c/w statin   # Hypertension:   c/w amlodipine and HCTZ    # Metastatic prostate cancer -  Patient was on Zytiga however possibly stopped recently. Will clarify as to why he stopped.  c/w tamsulosin     GI PPX: Not indicated    DVT PPX: Lovenox   Dispo: Home w/Home care

## 2019-10-17 NOTE — PROGRESS NOTE ADULT - SUBJECTIVE AND OBJECTIVE BOX
Patient was seen and examined. Spoke with RN. Chart reviewed.  No events overnight. 1:1 at bedside  Vital Signs Last 24 Hrs  T(F): 96.7 (17 Oct 2019 05:18), Max: 96.9 (16 Oct 2019 14:15)  HR: 61 (17 Oct 2019 07:45) (61 - 74)  BP: 139/67 (17 Oct 2019 05:18) (130/61 - 144/67)  SpO2: 98% (17 Oct 2019 07:45) (95% - 98%)  MEDICATIONS  (STANDING):  amLODIPine   Tablet 5 milliGRAM(s) Oral daily  atorvastatin 20 milliGRAM(s) Oral at bedtime  calcium carbonate 1250 mG  + Vitamin D (OsCal 500 + D) 1 Tablet(s) Oral daily  donepezil 5 milliGRAM(s) Oral at bedtime  enoxaparin Injectable 40 milliGRAM(s) SubCutaneous daily  hydrochlorothiazide 12.5 milliGRAM(s) Oral daily  multivitamin 1 Tablet(s) Oral daily  tamsulosin 0.4 milliGRAM(s) Oral at bedtime    MEDICATIONS  (PRN):    Labs:                        12.1   5.67  )-----------( 262      ( 17 Oct 2019 06:19 )             36.1     17 Oct 2019 06:19    143    |  106    |  14     ----------------------------<  70     4.3     |  23     |  1.0      Ca    8.6        17 Oct 2019 06:19  Mg     2.2       17 Oct 2019 06:19    TPro  6.6    /  Alb  3.7    /  TBili  0.5    /  DBili  x      /  AST  50     /  ALT  18     /  AlkPhos  47     17 Oct 2019 06:19          General: comfortable, NAD        A/P:  86 yo man with dementia- hallucinations    psych f/u    case managment- DC planning to SNF    meds as prescribed    outpt  f/u    aspiration precautions    fall precautions    awaiting ID input for FTA  DVT prophylaxis  Decubitus prevention- all measures as per RN protocol  Please call or text me with any questions or updates

## 2019-10-18 LAB
ALBUMIN SERPL ELPH-MCNC: 3.7 G/DL — SIGNIFICANT CHANGE UP (ref 3.5–5.2)
ALP SERPL-CCNC: 50 U/L — SIGNIFICANT CHANGE UP (ref 30–115)
ALT FLD-CCNC: 17 U/L — SIGNIFICANT CHANGE UP (ref 0–41)
ANION GAP SERPL CALC-SCNC: 12 MMOL/L — SIGNIFICANT CHANGE UP (ref 7–14)
APPEARANCE CSF: CLEAR — SIGNIFICANT CHANGE UP
APPEARANCE SPUN FLD: SIGNIFICANT CHANGE UP
APTT BLD: 34.3 SEC — SIGNIFICANT CHANGE UP (ref 27–39.2)
AST SERPL-CCNC: 39 U/L — SIGNIFICANT CHANGE UP (ref 0–41)
BASOPHILS # BLD AUTO: 0.04 K/UL — SIGNIFICANT CHANGE UP (ref 0–0.2)
BASOPHILS NFR BLD AUTO: 0.7 % — SIGNIFICANT CHANGE UP (ref 0–1)
BILIRUB SERPL-MCNC: 0.3 MG/DL — SIGNIFICANT CHANGE UP (ref 0.2–1.2)
BLD GP AB SCN SERPL QL: SIGNIFICANT CHANGE UP
BUN SERPL-MCNC: 14 MG/DL — SIGNIFICANT CHANGE UP (ref 10–20)
CALCIUM SERPL-MCNC: 8.3 MG/DL — LOW (ref 8.5–10.1)
CHLORIDE SERPL-SCNC: 107 MMOL/L — SIGNIFICANT CHANGE UP (ref 98–110)
CO2 SERPL-SCNC: 23 MMOL/L — SIGNIFICANT CHANGE UP (ref 17–32)
COLOR CSF: SIGNIFICANT CHANGE UP
CREAT SERPL-MCNC: 0.9 MG/DL — SIGNIFICANT CHANGE UP (ref 0.7–1.5)
EOSINOPHIL # BLD AUTO: 0.03 K/UL — SIGNIFICANT CHANGE UP (ref 0–0.7)
EOSINOPHIL NFR BLD AUTO: 0.5 % — SIGNIFICANT CHANGE UP (ref 0–8)
GLUCOSE CSF-MCNC: 76 MG/DL — HIGH (ref 45–75)
GLUCOSE SERPL-MCNC: 127 MG/DL — HIGH (ref 70–99)
GRAM STN FLD: SIGNIFICANT CHANGE UP
HCT VFR BLD CALC: 37.8 % — LOW (ref 42–52)
HGB BLD-MCNC: 12.7 G/DL — LOW (ref 14–18)
IMM GRANULOCYTES NFR BLD AUTO: 0.2 % — SIGNIFICANT CHANGE UP (ref 0.1–0.3)
INR BLD: 1.02 RATIO — SIGNIFICANT CHANGE UP (ref 0.65–1.3)
LYMPHOCYTES # BLD AUTO: 1.98 K/UL — SIGNIFICANT CHANGE UP (ref 1.2–3.4)
LYMPHOCYTES # BLD AUTO: 33.1 % — SIGNIFICANT CHANGE UP (ref 20.5–51.1)
MAGNESIUM SERPL-MCNC: 2.2 MG/DL — SIGNIFICANT CHANGE UP (ref 1.8–2.4)
MCHC RBC-ENTMCNC: 29.9 PG — SIGNIFICANT CHANGE UP (ref 27–31)
MCHC RBC-ENTMCNC: 33.6 G/DL — SIGNIFICANT CHANGE UP (ref 32–37)
MCV RBC AUTO: 88.9 FL — SIGNIFICANT CHANGE UP (ref 80–94)
MONOCYTES # BLD AUTO: 0.58 K/UL — SIGNIFICANT CHANGE UP (ref 0.1–0.6)
MONOCYTES NFR BLD AUTO: 9.7 % — HIGH (ref 1.7–9.3)
NEUTROPHILS # BLD AUTO: 3.34 K/UL — SIGNIFICANT CHANGE UP (ref 1.4–6.5)
NEUTROPHILS # CSF: SIGNIFICANT CHANGE UP % (ref 0–6)
NEUTROPHILS NFR BLD AUTO: 55.8 % — SIGNIFICANT CHANGE UP (ref 42.2–75.2)
NRBC # BLD: 0 /100 WBCS — SIGNIFICANT CHANGE UP (ref 0–0)
NRBC NFR CSF: 1 /UL — SIGNIFICANT CHANGE UP (ref 0–5)
PLATELET # BLD AUTO: 275 K/UL — SIGNIFICANT CHANGE UP (ref 130–400)
POTASSIUM SERPL-MCNC: 4 MMOL/L — SIGNIFICANT CHANGE UP (ref 3.5–5)
POTASSIUM SERPL-SCNC: 4 MMOL/L — SIGNIFICANT CHANGE UP (ref 3.5–5)
PROT CSF-MCNC: 48 MG/DL — HIGH (ref 15–45)
PROT SERPL-MCNC: 6.4 G/DL — SIGNIFICANT CHANGE UP (ref 6–8)
PROTHROM AB SERPL-ACNC: 11.7 SEC — SIGNIFICANT CHANGE UP (ref 9.95–12.87)
RBC # BLD: 4.25 M/UL — LOW (ref 4.7–6.1)
RBC # CSF: 1 /UL — SIGNIFICANT CHANGE UP (ref 0–0)
RBC # FLD: 13.9 % — SIGNIFICANT CHANGE UP (ref 11.5–14.5)
SODIUM SERPL-SCNC: 142 MMOL/L — SIGNIFICANT CHANGE UP (ref 135–146)
TUBE TYPE: SIGNIFICANT CHANGE UP
WBC # BLD: 5.98 K/UL — SIGNIFICANT CHANGE UP (ref 4.8–10.8)
WBC # FLD AUTO: 5.98 K/UL — SIGNIFICANT CHANGE UP (ref 4.8–10.8)

## 2019-10-18 PROCEDURE — 62270 DX LMBR SPI PNXR: CPT

## 2019-10-18 PROCEDURE — 77003 FLUOROGUIDE FOR SPINE INJECT: CPT | Mod: 26

## 2019-10-18 RX ORDER — LIDOCAINE HCL 20 MG/ML
2 VIAL (ML) INJECTION ONCE
Refills: 0 | Status: COMPLETED | OUTPATIENT
Start: 2019-10-18 | End: 2019-10-18

## 2019-10-18 RX ADMIN — SODIUM CHLORIDE 75 MILLILITER(S): 9 INJECTION, SOLUTION INTRAVENOUS at 03:00

## 2019-10-18 RX ADMIN — DONEPEZIL HYDROCHLORIDE 5 MILLIGRAM(S): 10 TABLET, FILM COATED ORAL at 22:07

## 2019-10-18 RX ADMIN — Medication 1 TABLET(S): at 12:39

## 2019-10-18 RX ADMIN — Medication 2 MILLILITER(S): at 14:38

## 2019-10-18 RX ADMIN — TAMSULOSIN HYDROCHLORIDE 0.4 MILLIGRAM(S): 0.4 CAPSULE ORAL at 22:07

## 2019-10-18 RX ADMIN — ATORVASTATIN CALCIUM 20 MILLIGRAM(S): 80 TABLET, FILM COATED ORAL at 22:07

## 2019-10-18 NOTE — PROCEDURE NOTE - ATTENDING PROVIDER
Neuro-ICU Progress Note    24 hour events:  Required straight cath x 2 overnight. No events.     Physical Examination:  Temp: 96.1  F (35.6  C) Temp src: Oral BP: 111/72 Pulse: 63 Heart Rate: 84 Resp: 18 SpO2: 100 % O2 Device: None (Room air)      Gen: non distressed man, sitting in chair  Cardiac: RRR  Lungs: Clear  Abdomen: non distended     Neurologic  Mental Status:  Awake/alert. Speech is fluent, no paraphasic errors.  Cranial Nerves:  Visual fields full,  EOM intact, no facial droop, hearing intact to conversation, no dysarthria, shoulder shrug strong bilaterally, tongue protrusion midline.  Motor:  No abnormal movements, normal muscle bulk, no pronator drift, able to move all limbs spontaneously, strength 5/5 throughout RUE, LUE, and LLE.   Sensory:  Intact to LT, right sided neglect noted intermittently.  Coordination:  FNF and HS intact without dysmetria  Station/Gait: Not assessed    Labs/Studies:  CBC and BMP wnl (12/7/18)    Imaging:  No new images     Assessment and Plan:  Simone Daniels is a 72 year old male with PMHx of anxiety, GERD who presents w/ 3 days of nausea, generalized chills and subjective fevers as well as generalized weakness and imbalance, was found to have L parietal hypodensity associated hemorrhagic conversion. MRI revealed hemorrhagic infarction. Probably HI2, vessels are patent. EKG revealed a fib, echo revealed moderate to severe left atrial enlargement and moderate right atrial enlargement. No PFO. The working diagnosis is cardio embolic ischemic stroke with hemorrhagic transformation in the setting of new A fib. The blood pressure also is high since admission. This could be due to the acute stroke +/- baseline untreated hypertension.  LDL was 78 and HbA1c is 5.5%     #Acute hemorrhagic infarct:  Probably cardioembolic due to a fib not on anticoagulant. Now on apixaban.  - apixaban 5 mg BID  - Neuro checks q4h  - Will need acute rehab, placement pending    #Afib  Newly  diagnosed on admission by EKG and telemetry. No RVR. [id number] score=4. Allowed 7 days before starting anticoagulation due to hemorrhagic conversion. Now on apixaban as of 12/4, heparin and ASA discontinued.   - apixaban 5 mg BID     #Elevated troponin  Troponin 0.057 on admission, downtrended to 0.149. Likely type 2 demand ischemia in the setting of afib and stroke, trop max 0.149. Echo completed, no evidence of infarct.   - If pt has chest pressure, get EKG     #High blood pressure (resolved)  BP running high since admission, could be 2/2 acute stroke vs untreated chronic hypertension. Started lisinopril and Flomax this admission, BPs now wnl.  - lisinopril 5 mg    #Urinary retention with incontinence  H/o retention and intermittent incontinence at home. Pt did not seek medical advice, was treating himself with natural remedies and was using pads. Bladder scans performed prn; patient unable to feel when bladder is full. Elena placed after repeated straight catheterization. Initially returning blood-tinged urine, now improving. Elena removed after void trial, though noted to have high PVRs requiring intermittent straight cath. Increasing difficulty with repeated catheterization initially reported; patient appears to now be recovering spontaneous voiding.   - Continue to monitor, consider replacing Elena if persistent issues with voiding and/or intermittent cath  - Continue Flomax 0.4 mg daily  - Inpt Urology consult completed, follow up as outpatient  - Discussed w urology 12/8/2018- will continue straight cath and pt needs to learn to straight cath (or have this continued at TCU), no elena.    #Constipation   No BM for several days. Senokot started, Fleet enema ordered 12/6.   - Senokot 8.6 mg PO BID prn  - repeat enema prn  - aggressive bowel reg to help alleviate urinary retention.     #GERD  Not on medications, asymptomatic.   - no need to start PPI for now  - defer management to PCP        FEN: regular  diet  PPx: apixaban  Code status: Full  Disposition: TCU for cognitive impairment per OT recs, medically ready for discharge       Patient was seen and discussed with Dr. Nicholson.    Mita Shaffer MD  Stroke fellow     [facility]

## 2019-10-18 NOTE — PROGRESS NOTE ADULT - ASSESSMENT
· Assessment		   87y old  Male who presents with a chief complaint of Hallucinations.    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

## 2019-10-18 NOTE — PROGRESS NOTE ADULT - SUBJECTIVE AND OBJECTIVE BOX
OKSANA BRANHAM  87y, Male    All available historical data reviewed    OVERNIGHT EVENTS:  none  LP pending  ROS:  General: no rigors  HEENT: no headache, sore throat, eye pain  CV: Denies CP, palpitations  PULM: Denies SOB, wheezing  GI: Denies hematemesis  : Denies ddysuria  MSK: Denies arthralgias, myalgias  NEURO: responds albeit very slowly ans not always    VITALS:  T(F): 98.1, Max: 98.2 (10-17-19 @ 19:50)  HR: 53  BP: 173/80  RR: 17Vital Signs Last 24 Hrs  T(C): 36.7 (18 Oct 2019 05:36), Max: 36.8 (17 Oct 2019 19:50)  T(F): 98.1 (18 Oct 2019 05:36), Max: 98.2 (17 Oct 2019 19:50)  HR: 53 (18 Oct 2019 08:25) (53 - 61)  BP: 173/80 (18 Oct 2019 05:36) (117/52 - 173/80)  BP(mean): --  RR: 17 (18 Oct 2019 05:36) (17 - 18)  SpO2: 98% (18 Oct 2019 08:25) (98% - 98%)    TESTS & MEASUREMENTS:                        12.7   5.98  )-----------( 275      ( 18 Oct 2019 06:43 )             37.8     10-18    142  |  107  |  14  ----------------------------<  127<H>  4.0   |  23  |  0.9    Ca    8.3<L>      18 Oct 2019 06:43  Mg     2.2     10-18    TPro  6.4  /  Alb  3.7  /  TBili  0.3  /  DBili  x   /  AST  39  /  ALT  17  /  AlkPhos  50  10-18    LIVER FUNCTIONS - ( 18 Oct 2019 06:43 )  Alb: 3.7 g/dL / Pro: 6.4 g/dL / ALK PHOS: 50 U/L / ALT: 17 U/L / AST: 39 U/L / GGT: x                   RADIOLOGY & ADDITIONAL TESTS:  Personal review of radiological diagnostics performed  Echo and EKG results noted when applicable.     ANTIBIOTICS:

## 2019-10-18 NOTE — PROGRESS NOTE ADULT - SUBJECTIVE AND OBJECTIVE BOX
SUBJECTIVE:    Patient is a 87y old  Male who presents with a chief complaint of Hallucinations (18 Oct 2019 16:52)    Currently admitted to medicine with the primary diagnosis of Hallucinations     Today is hospital day 4d. This morning he is resting comfortably in bed and reports no new issues or overnight events.     Patient's mental status is improving upon IVF. Patient able to state where he is, his name, month and year.      PAST MEDICAL & SURGICAL HISTORY  PAST MEDICAL & SURGICAL HISTORY:  H/O hypokalemia  Dyslipidemia  Hypertension  Prostate cancer      ALLERGIES:  No Known Allergies    MEDICATIONS:  STANDING MEDICATIONS  amLODIPine   Tablet 5 milliGRAM(s) Oral daily  atorvastatin 20 milliGRAM(s) Oral at bedtime  calcium carbonate 1250 mG  + Vitamin D (OsCal 500 + D) 1 Tablet(s) Oral daily  dextrose 5% + sodium chloride 0.9%. 1000 milliLiter(s) IV Continuous <Continuous>  donepezil 5 milliGRAM(s) Oral at bedtime  hydrochlorothiazide 12.5 milliGRAM(s) Oral daily  multivitamin 1 Tablet(s) Oral daily  tamsulosin 0.4 milliGRAM(s) Oral at bedtime    PRN MEDICATIONS    VITALS:   T(F): 97.7  HR: 63  BP: 168/72  RR: 18  SpO2: 98%    LABS:                        12.7   5.98  )-----------( 275      ( 18 Oct 2019 06:43 )             37.8     10-18    142  |  107  |  14  ----------------------------<  127<H>  4.0   |  23  |  0.9    Ca    8.3<L>      18 Oct 2019 06:43  Mg     2.2     10-18    TPro  6.4  /  Alb  3.7  /  TBili  0.3  /  DBili  x   /  AST  39  /  ALT  17  /  AlkPhos  50  10-18    PT/INR - ( 18 Oct 2019 11:32 )   PT: 11.70 sec;   INR: 1.02 ratio         PTT - ( 18 Oct 2019 11:32 )  PTT:34.3 sec              RADIOLOGY  < from: CT Head No Cont (10.14.19 @ 16:06) >    IMPRESSION:    Cerebral and cerebellar atrophy.    < end of copied text >    PHYSICAL EXAM:  GEN: No acute distress  HEENT: NCAT. Dry mucus membranes.    LUNGS: Clear to auscultation bilaterally   HEART: RRR. no murmurs  ABD: Soft, non-tender, non-distended  EXT: no edema  NEURO: AAOX3.     Assessment and Plan    Patient is a 87y old Male who presents with a chief complaint of Currently admitted to medicine with the primary diagnosis of Hallucinations    #Visual Hallucinations 2/2 Delirium likely due to Dehydration vs. Neurosyphilis   CTH revealed cerebellar and cerebral atrophy-->Not significant in a patient of this age     B12, Folate, TSH wnl.   RPR negative, but FTA-ABS positive  LP Performed-->Son gave verbal consent over phone and consent form is patient's chart   CSF fluid studies sent (VRDL, Lymes, Cryptococcus, HIV, Fungal, PCR, Gram stain)-->will follow up on results   c/w IVF-->Patient's mental status was improving upon hydration.   Psych recommendations: Requires high level of care than at home.   Likely due to underlying delirium or the patient is sundowning.  But symptoms are not of a true psychiatric etiology   Currently working to set up home w/home care to provide better care for the patient.  Son does not want SNF   1:1 discontinued.      # Hyperlipidemia:   c/w statin   # Hypertension:   c/w amlodipine and HCTZ    # Metastatic prostate cancer -  Patient was on Zytiga however possibly stopped recently. Will clarify as to why he stopped.  c/w tamsulosin     GI PPX: Not indicated    DVT PPX: Lovenox   Activity: OOB  Dispo: Home w/Home care

## 2019-10-19 LAB
GRAM STN FLD: SIGNIFICANT CHANGE UP
LDH CSF L TO P-CCNC: 29 U/L — SIGNIFICANT CHANGE UP
LDH FLD-CCNC: 29 U/L — SIGNIFICANT CHANGE UP
SPECIMEN SOURCE: SIGNIFICANT CHANGE UP

## 2019-10-19 RX ADMIN — AMLODIPINE BESYLATE 5 MILLIGRAM(S): 2.5 TABLET ORAL at 05:43

## 2019-10-19 RX ADMIN — ATORVASTATIN CALCIUM 20 MILLIGRAM(S): 80 TABLET, FILM COATED ORAL at 21:46

## 2019-10-19 RX ADMIN — Medication 12.5 MILLIGRAM(S): at 05:43

## 2019-10-19 RX ADMIN — DONEPEZIL HYDROCHLORIDE 5 MILLIGRAM(S): 10 TABLET, FILM COATED ORAL at 21:46

## 2019-10-19 RX ADMIN — Medication 1 TABLET(S): at 14:46

## 2019-10-19 RX ADMIN — TAMSULOSIN HYDROCHLORIDE 0.4 MILLIGRAM(S): 0.4 CAPSULE ORAL at 21:46

## 2019-10-19 RX ADMIN — SODIUM CHLORIDE 75 MILLILITER(S): 9 INJECTION, SOLUTION INTRAVENOUS at 19:48

## 2019-10-19 RX ADMIN — SODIUM CHLORIDE 75 MILLILITER(S): 9 INJECTION, SOLUTION INTRAVENOUS at 05:43

## 2019-10-19 RX ADMIN — Medication 1 TABLET(S): at 14:45

## 2019-10-19 NOTE — PROGRESS NOTE ADULT - ASSESSMENT
metabolic encephalopathy  dementia  r/o organic cause  id fu  neuro fu  psych fu      fu csf cx  ivf  continue current rx

## 2019-10-19 NOTE — PROGRESS NOTE ADULT - SUBJECTIVE AND OBJECTIVE BOX
Patient was seen and examined. Spoke with RN. Chart reviewed.    No events overnight.  Vital Signs Last 24 Hrs  T(F): 96.7 (19 Oct 2019 05:32), Max: 97.7 (18 Oct 2019 19:56)  HR: 63 (19 Oct 2019 06:45) (63 - 65)  BP: 142/70 (19 Oct 2019 06:45) (142/70 - 170/74)  SpO2: 94% (18 Oct 2019 22:17) (94% - 94%)  MEDICATIONS  (STANDING):  amLODIPine   Tablet 5 milliGRAM(s) Oral daily  atorvastatin 20 milliGRAM(s) Oral at bedtime  calcium carbonate 1250 mG  + Vitamin D (OsCal 500 + D) 1 Tablet(s) Oral daily  dextrose 5% + sodium chloride 0.9%. 1000 milliLiter(s) (75 mL/Hr) IV Continuous <Continuous>  donepezil 5 milliGRAM(s) Oral at bedtime  hydrochlorothiazide 12.5 milliGRAM(s) Oral daily  multivitamin 1 Tablet(s) Oral daily  tamsulosin 0.4 milliGRAM(s) Oral at bedtime    MEDICATIONS  (PRN):    Labs:                        12.7   5.98  )-----------( 275      ( 18 Oct 2019 06:43 )             37.8     18 Oct 2019 06:43    142    |  107    |  14     ----------------------------<  127    4.0     |  23     |  0.9      Ca    8.3        18 Oct 2019 06:43  Mg     2.2       18 Oct 2019 06:43    TPro  6.4    /  Alb  3.7    /  TBili  0.3    /  DBili  x      /  AST  39     /  ALT  17     /  AlkPhos  50     18 Oct 2019 06:43    PT/INR - ( 18 Oct 2019 11:32 )   PT: 11.70 sec;   INR: 1.02 ratio         PTT - ( 18 Oct 2019 11:32 )  PTT:34.3 sec      Culture - CSF with Gram Stain (collected 18 Oct 2019 16:24)  Source: .CSF CSF  Gram Stain (19 Oct 2019 03:48):    polymorphonuclear leukocytes seen    No organisms seen    by cytocentrifuge        Radiology:    General: comfortable, NAD  Neurology: lethargic, but arousable,confused,  Head:  Normocephalic, atraumatic  ENT:  Mucosa moist, no ulcerations  Neck:  Supple, no JVD,   Skin: no breakdowns (as per RN)  Resp: CTA B/L  CV: RRR, S1S2,   GI: Soft, NT, bowel sounds  MS: No edema, + peripheral pulses, FROM all 4 extremity

## 2019-10-19 NOTE — PROGRESS NOTE ADULT - SUBJECTIVE AND OBJECTIVE BOX
OKSANA BRANHAM  87y, Male    All available historical data reviewed    OVERNIGHT EVENTS:    none  ROS:  not attainable  VITALS:  T(F): 96.4, Max: 97.7 (10-18-19 @ 19:56)  HR: 58  BP: 113/59  RR: 20Vital Signs Last 24 Hrs  T(C): 35.8 (19 Oct 2019 15:05), Max: 36.5 (18 Oct 2019 19:56)  T(F): 96.4 (19 Oct 2019 15:05), Max: 97.7 (18 Oct 2019 19:56)  HR: 58 (19 Oct 2019 15:05) (58 - 65)  BP: 113/59 (19 Oct 2019 15:05) (113/59 - 170/74)  BP(mean): --  RR: 20 (19 Oct 2019 15:05) (17 - 20)  SpO2: 94% (18 Oct 2019 22:17) (94% - 94%)    TESTS & MEASUREMENTS:                        12.7   5.98  )-----------( 275      ( 18 Oct 2019 06:43 )             37.8     10-18    142  |  107  |  14  ----------------------------<  127<H>  4.0   |  23  |  0.9    Ca    8.3<L>      18 Oct 2019 06:43  Mg     2.2     10-18    TPro  6.4  /  Alb  3.7  /  TBili  0.3  /  DBili  x   /  AST  39  /  ALT  17  /  AlkPhos  50  10-18    LIVER FUNCTIONS - ( 18 Oct 2019 06:43 )  Alb: 3.7 g/dL / Pro: 6.4 g/dL / ALK PHOS: 50 U/L / ALT: 17 U/L / AST: 39 U/L / GGT: x             Culture - Fungal, CSF (collected 10-18-19 @ 16:24)  Source: .CSF CSF  Preliminary Report (10-19-19 @ 15:05):    Testing in progress    Culture - CSF with Gram Stain (collected 10-18-19 @ 16:24)  Source: .CSF CSF  Gram Stain (10-19-19 @ 03:48):    polymorphonuclear leukocytes seen    No organisms seen    by cytocentrifuge            RADIOLOGY & ADDITIONAL TESTS:  Personal review of radiological diagnostics performed  Echo and EKG results noted when applicable.     ANTIBIOTICS:

## 2019-10-19 NOTE — PROGRESS NOTE ADULT - ASSESSMENT
· Assessment		   87y old  Male who presents with a chief complaint of Hallucinations.    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

## 2019-10-19 NOTE — PROGRESS NOTE ADULT - SUBJECTIVE AND OBJECTIVE BOX
SUBJECTIVE:    Patient is a 87y old  Male who presents with a chief complaint of Hallucinations (18 Oct 2019 20:07)    Currently admitted to medicine with the primary diagnosis of Hallucinations     Today is hospital day 5d.     No acute overnight events.  The patient is oriented to himself, and where he is.  But when communicating with him, he keeps mentioning that he is looking for a job.  Despite repeatedly trying to orient him, he insists that he wants a job.  No acute overnight events. Currently on IVF.      PAST MEDICAL & SURGICAL HISTORY  PAST MEDICAL & SURGICAL HISTORY:  H/O hypokalemia  Dyslipidemia  Hypertension  Prostate cancer      ALLERGIES:  No Known Allergies    MEDICATIONS:  STANDING MEDICATIONS  amLODIPine   Tablet 5 milliGRAM(s) Oral daily  atorvastatin 20 milliGRAM(s) Oral at bedtime  calcium carbonate 1250 mG  + Vitamin D (OsCal 500 + D) 1 Tablet(s) Oral daily  dextrose 5% + sodium chloride 0.9%. 1000 milliLiter(s) IV Continuous <Continuous>  donepezil 5 milliGRAM(s) Oral at bedtime  hydrochlorothiazide 12.5 milliGRAM(s) Oral daily  multivitamin 1 Tablet(s) Oral daily  tamsulosin 0.4 milliGRAM(s) Oral at bedtime    PRN MEDICATIONS    VITALS:   T(F): 96.7  HR: 63  BP: 142/70  RR: 17  SpO2: 94%    LABS:                        12.7   5.98  )-----------( 275      ( 18 Oct 2019 06:43 )             37.8     10-18    142  |  107  |  14  ----------------------------<  127<H>  4.0   |  23  |  0.9    Ca    8.3<L>      18 Oct 2019 06:43  Mg     2.2     10-18    TPro  6.4  /  Alb  3.7  /  TBili  0.3  /  DBili  x   /  AST  39  /  ALT  17  /  AlkPhos  50  10-18    PT/INR - ( 18 Oct 2019 11:32 )   PT: 11.70 sec;   INR: 1.02 ratio         PTT - ( 18 Oct 2019 11:32 )  PTT:34.3 sec          Culture - CSF with Gram Stain (collected 18 Oct 2019 16:24)  Source: .CSF CSF  Gram Stain (19 Oct 2019 03:48):    polymorphonuclear leukocytes seen    No organisms seen    by cytocentrifuge          RADIOLOGY  < from: CT Head No Cont (10.14.19 @ 16:06) >    IMPRESSION:    Cerebral and cerebellar atrophy.    < end of copied text >    PHYSICAL EXAM:  GEN: No acute distress  HEENT: NCAT. Dry mucus membranes.    LUNGS: Clear to auscultation bilaterally   HEART: RRR. no murmurs  ABD: Soft, non-tender, non-distended  EXT: no edema  NEURO: AAOX2. No focal neurological deficits.      Assessment and Plan    Patient is a 87y old Male who presents with a chief complaint of Currently admitted to medicine with the primary diagnosis of Hallucinations    #Visual Hallucinations 2/2 Delirium likely due to Dehydration vs. Neurosyphilis   CTH revealed cerebellar and cerebral atrophy-->Not significant in a patient of this age   B12, Folate, TSH wnl.   RPR negative, but FTA-ABS positive  CSF fluid studies-->Negative for cell count.  Still pending VRDL, Lymes, cryptococcus, acid fast, fungal and west nile.   c/w IVF-->Patient's mental status was improving upon hydration. Although still altered, he is a lot more responsive than what he previously has been.  Psych recommendations: Requires high level of care than at home.   Likely due to underlying delirium or the patient is sundowning.  But symptoms are not of a true psychiatric etiology   Currently working to set up home w/home care to provide better care for the patient.  Son does not want SNF   1:1 discontinued.      # Hyperlipidemia:   c/w statin     # Hypertension (Stable at 142/70)  c/w amlodipine and HCTZ    # Metastatic prostate cancer -  Patient was on Zytiga however possibly stopped recently. Will clarify as to why he stopped.  c/w tamsulosin     GI PPX: Not indicated    DVT PPX: Lovenox   Activity: OOB  Dispo: Home w/Home care

## 2019-10-20 LAB
ALBUMIN SERPL ELPH-MCNC: 2.9 G/DL — LOW (ref 3.5–5.2)
ALP SERPL-CCNC: 41 U/L — SIGNIFICANT CHANGE UP (ref 30–115)
ALT FLD-CCNC: 17 U/L — SIGNIFICANT CHANGE UP (ref 0–41)
ANION GAP SERPL CALC-SCNC: 11 MMOL/L — SIGNIFICANT CHANGE UP (ref 7–14)
AST SERPL-CCNC: 34 U/L — SIGNIFICANT CHANGE UP (ref 0–41)
BASOPHILS # BLD AUTO: 0.02 K/UL — SIGNIFICANT CHANGE UP (ref 0–0.2)
BASOPHILS NFR BLD AUTO: 0.4 % — SIGNIFICANT CHANGE UP (ref 0–1)
BILIRUB SERPL-MCNC: 0.2 MG/DL — SIGNIFICANT CHANGE UP (ref 0.2–1.2)
BUN SERPL-MCNC: 12 MG/DL — SIGNIFICANT CHANGE UP (ref 10–20)
CALCIUM SERPL-MCNC: 7.5 MG/DL — LOW (ref 8.5–10.1)
CHLORIDE SERPL-SCNC: 110 MMOL/L — SIGNIFICANT CHANGE UP (ref 98–110)
CO2 SERPL-SCNC: 23 MMOL/L — SIGNIFICANT CHANGE UP (ref 17–32)
CREAT SERPL-MCNC: 0.9 MG/DL — SIGNIFICANT CHANGE UP (ref 0.7–1.5)
CSF PCR RESULT: SIGNIFICANT CHANGE UP
EOSINOPHIL # BLD AUTO: 0.08 K/UL — SIGNIFICANT CHANGE UP (ref 0–0.7)
EOSINOPHIL NFR BLD AUTO: 1.4 % — SIGNIFICANT CHANGE UP (ref 0–8)
GLUCOSE SERPL-MCNC: 100 MG/DL — HIGH (ref 70–99)
HCT VFR BLD CALC: 32 % — LOW (ref 42–52)
HGB BLD-MCNC: 10.9 G/DL — LOW (ref 14–18)
IMM GRANULOCYTES NFR BLD AUTO: 0.2 % — SIGNIFICANT CHANGE UP (ref 0.1–0.3)
LYMPHOCYTES # BLD AUTO: 1.76 K/UL — SIGNIFICANT CHANGE UP (ref 1.2–3.4)
LYMPHOCYTES # BLD AUTO: 30.9 % — SIGNIFICANT CHANGE UP (ref 20.5–51.1)
MAGNESIUM SERPL-MCNC: 2 MG/DL — SIGNIFICANT CHANGE UP (ref 1.8–2.4)
MCHC RBC-ENTMCNC: 30.4 PG — SIGNIFICANT CHANGE UP (ref 27–31)
MCHC RBC-ENTMCNC: 34.1 G/DL — SIGNIFICANT CHANGE UP (ref 32–37)
MCV RBC AUTO: 89.1 FL — SIGNIFICANT CHANGE UP (ref 80–94)
MONOCYTES # BLD AUTO: 0.51 K/UL — SIGNIFICANT CHANGE UP (ref 0.1–0.6)
MONOCYTES NFR BLD AUTO: 8.9 % — SIGNIFICANT CHANGE UP (ref 1.7–9.3)
NEUTROPHILS # BLD AUTO: 3.32 K/UL — SIGNIFICANT CHANGE UP (ref 1.4–6.5)
NEUTROPHILS NFR BLD AUTO: 58.2 % — SIGNIFICANT CHANGE UP (ref 42.2–75.2)
NRBC # BLD: 0 /100 WBCS — SIGNIFICANT CHANGE UP (ref 0–0)
PLATELET # BLD AUTO: 221 K/UL — SIGNIFICANT CHANGE UP (ref 130–400)
POTASSIUM SERPL-MCNC: 3.6 MMOL/L — SIGNIFICANT CHANGE UP (ref 3.5–5)
POTASSIUM SERPL-SCNC: 3.6 MMOL/L — SIGNIFICANT CHANGE UP (ref 3.5–5)
PROT SERPL-MCNC: 5.3 G/DL — LOW (ref 6–8)
RBC # BLD: 3.59 M/UL — LOW (ref 4.7–6.1)
RBC # FLD: 14.1 % — SIGNIFICANT CHANGE UP (ref 11.5–14.5)
SODIUM SERPL-SCNC: 144 MMOL/L — SIGNIFICANT CHANGE UP (ref 135–146)
WBC # BLD: 5.7 K/UL — SIGNIFICANT CHANGE UP (ref 4.8–10.8)
WBC # FLD AUTO: 5.7 K/UL — SIGNIFICANT CHANGE UP (ref 4.8–10.8)

## 2019-10-20 RX ORDER — PENICILLIN G BENZATHINE 1200000 [IU]/2ML
1.2 INJECTION, SUSPENSION INTRAMUSCULAR
Refills: 0 | Status: DISCONTINUED | OUTPATIENT
Start: 2019-10-20 | End: 2019-10-21

## 2019-10-20 RX ORDER — PENICILLIN G BENZATHINE 1200000 [IU]/2ML
1.2 INJECTION, SUSPENSION INTRAMUSCULAR
Refills: 0 | Status: DISCONTINUED | OUTPATIENT
Start: 2019-10-20 | End: 2019-10-29

## 2019-10-20 RX ADMIN — Medication 1 TABLET(S): at 12:08

## 2019-10-20 RX ADMIN — PENICILLIN G BENZATHINE 1.2 MILLION UNIT(S): 1200000 INJECTION, SUSPENSION INTRAMUSCULAR at 17:25

## 2019-10-20 RX ADMIN — ATORVASTATIN CALCIUM 20 MILLIGRAM(S): 80 TABLET, FILM COATED ORAL at 21:36

## 2019-10-20 RX ADMIN — SODIUM CHLORIDE 75 MILLILITER(S): 9 INJECTION, SOLUTION INTRAVENOUS at 21:36

## 2019-10-20 RX ADMIN — DONEPEZIL HYDROCHLORIDE 5 MILLIGRAM(S): 10 TABLET, FILM COATED ORAL at 21:36

## 2019-10-20 RX ADMIN — Medication 12.5 MILLIGRAM(S): at 05:44

## 2019-10-20 RX ADMIN — SODIUM CHLORIDE 75 MILLILITER(S): 9 INJECTION, SOLUTION INTRAVENOUS at 10:24

## 2019-10-20 RX ADMIN — AMLODIPINE BESYLATE 5 MILLIGRAM(S): 2.5 TABLET ORAL at 05:44

## 2019-10-20 RX ADMIN — TAMSULOSIN HYDROCHLORIDE 0.4 MILLIGRAM(S): 0.4 CAPSULE ORAL at 21:36

## 2019-10-20 NOTE — PROGRESS NOTE ADULT - ASSESSMENT
Assessment and Plan    Patient is a 87y old Male who presents with a chief complaint of Currently admitted to medicine with the primary diagnosis of Hallucinations    #Visual Hallucinations 2/2 Delirium likely due to Dehydration vs. Neurosyphilis   CTH revealed cerebellar and cerebral atrophy-->Not significant in a patient of this age   B12, Folate, TSH wnl.   RPR negative, but FTA-ABS positive - bneing treated as tertiary syphilis  , b/l intragluteal penicillin g ordered    CSF fluid studies-->Negative for cell count.  Still pending VRDL, Lymes, cryptococcus, acid fast, fungal and west nile.     # Hyperlipidemia:   c/w statin     # Hypertension (Stable at 142/70)  c/w amlodipine and HCTZ    # Metastatic prostate cancer -  Patient was on Zytiga however possibly stopped recently. Will clarify as to why he stopped.  c/w tamsulosin     GI PPX: Not indicated    DVT PPX: Lovenox   Activity: OOB  Dispo: Home w/Home care

## 2019-10-20 NOTE — PROGRESS NOTE ADULT - SUBJECTIVE AND OBJECTIVE BOX
Patient was seen and examined. Spoke with RN. Chart reviewed.  No events overnight.  Vital Signs Last 24 Hrs  T(F): 96.7 (20 Oct 2019 05:47), Max: 98.4 (19 Oct 2019 21:57)  HR: 56 (20 Oct 2019 05:47) (56 - 63)  BP: 140/66 (20 Oct 2019 05:47) (113/59 - 140/66)  SpO2: 96% (20 Oct 2019 08:36) (96% - 97%)  MEDICATIONS  (STANDING):  amLODIPine   Tablet 5 milliGRAM(s) Oral daily  atorvastatin 20 milliGRAM(s) Oral at bedtime  calcium carbonate 1250 mG  + Vitamin D (OsCal 500 + D) 1 Tablet(s) Oral daily  dextrose 5% + sodium chloride 0.9%. 1000 milliLiter(s) (75 mL/Hr) IV Continuous <Continuous>  donepezil 5 milliGRAM(s) Oral at bedtime  hydrochlorothiazide 12.5 milliGRAM(s) Oral daily  multivitamin 1 Tablet(s) Oral daily  tamsulosin 0.4 milliGRAM(s) Oral at bedtime    MEDICATIONS  (PRN):    Labs:                        10.9   5.70  )-----------( 221      ( 20 Oct 2019 06:25 )             32.0     20 Oct 2019 06:25    144    |  110    |  12     ----------------------------<  100    3.6     |  23     |  0.9      Ca    7.5        20 Oct 2019 06:25  Mg     2.0       20 Oct 2019 06:25    TPro  5.3    /  Alb  2.9    /  TBili  0.2    /  DBili  x      /  AST  34     /  ALT  17     /  AlkPhos  41     20 Oct 2019 06:25    PT/INR - ( 18 Oct 2019 11:32 )   PT: 11.70 sec;   INR: 1.02 ratio         PTT - ( 18 Oct 2019 11:32 )  PTT:34.3 sec      Culture - Fungal, CSF (collected 18 Oct 2019 16:24)  Source: .CSF CSF  Preliminary Report (19 Oct 2019 15:05):    Testing in progress    Culture - CSF with Gram Stain (collected 18 Oct 2019 16:24)  Source: .CSF CSF  Gram Stain (19 Oct 2019 03:48):    polymorphonuclear leukocytes seen    No organisms seen    by cytocentrifuge  Preliminary Report (20 Oct 2019 06:51):    No growth      General: comfortable, NAD  Neurology: a&a, confused, nonfocal  Head:  Normocephalic, atraumatic  ENT:  Mucosa moist, no ulcerations  Neck:  Supple, no JVD,   Skin: no breakdowns (as per RN)  Resp: CTA B/L  CV: RRR, S1S2,   GI: Soft, NT, bowel sounds  MS: No edema, + peripheral pulses, FROM all 4 extremity      A/P:    tertiary syphilis   dementia  encephalopathy    abx as per ID  ID following    neuro f/u   hydration   fall precautions   maintain safety   continue current care    DVT prophylaxis  Decubitus prevention- all measures as per RN protocol  Please call or text me with any questions or updates

## 2019-10-20 NOTE — PROGRESS NOTE ADULT - SUBJECTIVE AND OBJECTIVE BOX
SUBJECTIVE:    Patient is a 87y old Male who presents with a chief complaint of Hallucinations (20 Oct 2019 10:38)    Overnight Events:  Patient was seen at bed side this morning     PAST MEDICAL & SURGICAL HISTORY  H/O hypokalemia  Dyslipidemia  Hypertension  Prostate cancer    SOCIAL HISTORY:  Negative for smoking/alcohol/drug use.     ALLERGIES:  No Known Allergies    MEDICATIONS:  STANDING MEDICATIONS  amLODIPine   Tablet 5 milliGRAM(s) Oral daily  atorvastatin 20 milliGRAM(s) Oral at bedtime  calcium carbonate 1250 mG  + Vitamin D (OsCal 500 + D) 1 Tablet(s) Oral daily  dextrose 5% + sodium chloride 0.9%. 1000 milliLiter(s) IV Continuous <Continuous>  donepezil 5 milliGRAM(s) Oral at bedtime  hydrochlorothiazide 12.5 milliGRAM(s) Oral daily  multivitamin 1 Tablet(s) Oral daily  penicillin   G benzathine Injectable 1.2 Million Unit(s) IntraMuscular every 7 days  penicillin   G benzathine Injectable 1.2 Million Unit(s) IntraMuscular every 7 days  tamsulosin 0.4 milliGRAM(s) Oral at bedtime    PRN MEDICATIONS    VITALS:   T(F): 98.1  HR: 56  BP: 114/64  RR: 20  SpO2: 96%    10-19-19 @ 07:01  -  10-20-19 @ 07:00  --------------------------------------------------------  IN: 200 mL / OUT: 400 mL / NET: -200 mL      PHYSICAL EXAM:  . General: NAD  . HEENT NC AT   · Respiratory: Breath Sounds equal & clear to  auscultation, no accessory muscle use  · Cardiovascular: Regular rate & rhythm, normal S1, S2; no murmurs, gallops or rubs; no S3, S4  · Gastrointestinal	Soft, non-tender, no hepatosplenomegaly, normal bowel sounds  · Extremities:	No cyanosis, clubbing or edema  · Skin no macular rashs, no lacerations.   . Neuro:    LABS:                        10.9   5.70  )-----------( 221      ( 20 Oct 2019 06:25 )             32.0     10-20    144  |  110  |  12  ----------------------------<  100<H>  3.6   |  23  |  0.9    Ca    7.5<L>      20 Oct 2019 06:25  Mg     2.0     10-20    TPro  5.3<L>  /  Alb  2.9<L>  /  TBili  0.2  /  DBili  x   /  AST  34  /  ALT  17  /  AlkPhos  41  10-20              Culture - Fungal, CSF (collected 18 Oct 2019 16:24)  Source: .CSF CSF  Preliminary Report (19 Oct 2019 15:05):    Testing in progress    Culture - CSF with Gram Stain (collected 18 Oct 2019 16:24)  Source: .CSF CSF  Gram Stain (19 Oct 2019 03:48):    polymorphonuclear leukocytes seen    No organisms seen    by cytocentrifuge  Preliminary Report (20 Oct 2019 06:51):    No growth              10-19-19 @ 07:01  -  10-20-19 @ 07:00  --------------------------------------------------------  IN: 200 mL / OUT: 400 mL / NET: -200 mL          Radiology: SUBJECTIVE:    Patient is a 87y old Male who presents with a chief complaint of Hallucinations (20 Oct 2019 10:38)    Overnight Events:  Patient was seen at bed side this morning. no over night events . status unchanged still confused. vitals stable afebrile     PAST MEDICAL & SURGICAL HISTORY  H/O hypokalemia  Dyslipidemia  Hypertension  Prostate cancer    SOCIAL HISTORY:  Negative for smoking/alcohol/drug use.     ALLERGIES:  No Known Allergies    MEDICATIONS:  STANDING MEDICATIONS  amLODIPine   Tablet 5 milliGRAM(s) Oral daily  atorvastatin 20 milliGRAM(s) Oral at bedtime  calcium carbonate 1250 mG  + Vitamin D (OsCal 500 + D) 1 Tablet(s) Oral daily  dextrose 5% + sodium chloride 0.9%. 1000 milliLiter(s) IV Continuous <Continuous>  donepezil 5 milliGRAM(s) Oral at bedtime  hydrochlorothiazide 12.5 milliGRAM(s) Oral daily  multivitamin 1 Tablet(s) Oral daily  penicillin   G benzathine Injectable 1.2 Million Unit(s) IntraMuscular every 7 days  penicillin   G benzathine Injectable 1.2 Million Unit(s) IntraMuscular every 7 days  tamsulosin 0.4 milliGRAM(s) Oral at bedtime    PRN MEDICATIONS    VITALS:   T(F): 98.1  HR: 56  BP: 114/64  RR: 20  SpO2: 96%    10-19-19 @ 07:01  -  10-20-19 @ 07:00  --------------------------------------------------------  IN: 200 mL / OUT: 400 mL / NET: -200 mL      PHYSICAL EXAM:  . General: NAD  . HEENT NC AT   · Respiratory: Breath Sounds equal & clear to  auscultation, no accessory muscle use  · Cardiovascular: Regular rate & rhythm, normal S1, S2; no murmurs, gallops or rubs; no S3, S4  · Gastrointestinal	Soft, non-tender, no hepatosplenomegaly, normal bowel sounds  · Extremities:	No cyanosis, clubbing or edema  · Skin no macular rashs, no lacerations.   . Neuro:a&a, confused,    LABS:                        10.9   5.70  )-----------( 221      ( 20 Oct 2019 06:25 )             32.0     10-20    144  |  110  |  12  ----------------------------<  100<H>  3.6   |  23  |  0.9    Ca    7.5<L>      20 Oct 2019 06:25  Mg     2.0     10-20    TPro  5.3<L>  /  Alb  2.9<L>  /  TBili  0.2  /  DBili  x   /  AST  34  /  ALT  17  /  AlkPhos  41  10-20              Culture - Fungal, CSF (collected 18 Oct 2019 16:24)  Source: .CSF CSF  Preliminary Report (19 Oct 2019 15:05):    Testing in progress    Culture - CSF with Gram Stain (collected 18 Oct 2019 16:24)  Source: .CSF CSF  Gram Stain (19 Oct 2019 03:48):    polymorphonuclear leukocytes seen    No organisms seen    by cytocentrifuge  Preliminary Report (20 Oct 2019 06:51):    No growth              10-19-19 @ 07:01  -  10-20-19 @ 07:00  --------------------------------------------------------  IN: 200 mL / OUT: 400 mL / NET: -200 mL          Radiology:

## 2019-10-21 LAB
ALBUMIN SERPL ELPH-MCNC: 3.2 G/DL — LOW (ref 3.5–5.2)
ALP SERPL-CCNC: 42 U/L — SIGNIFICANT CHANGE UP (ref 30–115)
ALT FLD-CCNC: 18 U/L — SIGNIFICANT CHANGE UP (ref 0–41)
ANION GAP SERPL CALC-SCNC: 12 MMOL/L — SIGNIFICANT CHANGE UP (ref 7–14)
AST SERPL-CCNC: 34 U/L — SIGNIFICANT CHANGE UP (ref 0–41)
BASOPHILS # BLD AUTO: 0.02 K/UL — SIGNIFICANT CHANGE UP (ref 0–0.2)
BASOPHILS NFR BLD AUTO: 0.3 % — SIGNIFICANT CHANGE UP (ref 0–1)
BILIRUB SERPL-MCNC: 0.3 MG/DL — SIGNIFICANT CHANGE UP (ref 0.2–1.2)
BUN SERPL-MCNC: 15 MG/DL — SIGNIFICANT CHANGE UP (ref 10–20)
CALCIUM SERPL-MCNC: 8.1 MG/DL — LOW (ref 8.5–10.1)
CHLORIDE SERPL-SCNC: 107 MMOL/L — SIGNIFICANT CHANGE UP (ref 98–110)
CO2 SERPL-SCNC: 22 MMOL/L — SIGNIFICANT CHANGE UP (ref 17–32)
CREAT SERPL-MCNC: 0.8 MG/DL — SIGNIFICANT CHANGE UP (ref 0.7–1.5)
CRYPTOC AG CSF-ACNC: NEGATIVE — SIGNIFICANT CHANGE UP
EOSINOPHIL # BLD AUTO: 0.06 K/UL — SIGNIFICANT CHANGE UP (ref 0–0.7)
EOSINOPHIL NFR BLD AUTO: 1 % — SIGNIFICANT CHANGE UP (ref 0–8)
GLUCOSE SERPL-MCNC: 106 MG/DL — HIGH (ref 70–99)
HCT VFR BLD CALC: 33.9 % — LOW (ref 42–52)
HGB BLD-MCNC: 11.5 G/DL — LOW (ref 14–18)
IMM GRANULOCYTES NFR BLD AUTO: 0.3 % — SIGNIFICANT CHANGE UP (ref 0.1–0.3)
LYMPHOCYTES # BLD AUTO: 1.62 K/UL — SIGNIFICANT CHANGE UP (ref 1.2–3.4)
LYMPHOCYTES # BLD AUTO: 27.2 % — SIGNIFICANT CHANGE UP (ref 20.5–51.1)
MAGNESIUM SERPL-MCNC: 1.9 MG/DL — SIGNIFICANT CHANGE UP (ref 1.8–2.4)
MCHC RBC-ENTMCNC: 30.3 PG — SIGNIFICANT CHANGE UP (ref 27–31)
MCHC RBC-ENTMCNC: 33.9 G/DL — SIGNIFICANT CHANGE UP (ref 32–37)
MCV RBC AUTO: 89.4 FL — SIGNIFICANT CHANGE UP (ref 80–94)
MONOCYTES # BLD AUTO: 0.52 K/UL — SIGNIFICANT CHANGE UP (ref 0.1–0.6)
MONOCYTES NFR BLD AUTO: 8.7 % — SIGNIFICANT CHANGE UP (ref 1.7–9.3)
NEUTROPHILS # BLD AUTO: 3.72 K/UL — SIGNIFICANT CHANGE UP (ref 1.4–6.5)
NEUTROPHILS NFR BLD AUTO: 62.5 % — SIGNIFICANT CHANGE UP (ref 42.2–75.2)
NRBC # BLD: 0 /100 WBCS — SIGNIFICANT CHANGE UP (ref 0–0)
PLATELET # BLD AUTO: 185 K/UL — SIGNIFICANT CHANGE UP (ref 130–400)
POTASSIUM SERPL-MCNC: 4 MMOL/L — SIGNIFICANT CHANGE UP (ref 3.5–5)
POTASSIUM SERPL-SCNC: 4 MMOL/L — SIGNIFICANT CHANGE UP (ref 3.5–5)
PROT SERPL-MCNC: 5.9 G/DL — LOW (ref 6–8)
RBC # BLD: 3.79 M/UL — LOW (ref 4.7–6.1)
RBC # FLD: 14.2 % — SIGNIFICANT CHANGE UP (ref 11.5–14.5)
SODIUM SERPL-SCNC: 141 MMOL/L — SIGNIFICANT CHANGE UP (ref 135–146)
VDRL CSF-TITR: NEGATIVE — SIGNIFICANT CHANGE UP
WBC # BLD: 5.96 K/UL — SIGNIFICANT CHANGE UP (ref 4.8–10.8)
WBC # FLD AUTO: 5.96 K/UL — SIGNIFICANT CHANGE UP (ref 4.8–10.8)

## 2019-10-21 PROCEDURE — 99232 SBSQ HOSP IP/OBS MODERATE 35: CPT

## 2019-10-21 RX ORDER — SENNA PLUS 8.6 MG/1
1 TABLET ORAL DAILY
Refills: 0 | Status: DISCONTINUED | OUTPATIENT
Start: 2019-10-21 | End: 2019-10-29

## 2019-10-21 RX ORDER — HALOPERIDOL DECANOATE 100 MG/ML
0.5 INJECTION INTRAMUSCULAR ONCE
Refills: 0 | Status: COMPLETED | OUTPATIENT
Start: 2019-10-21 | End: 2019-10-21

## 2019-10-21 RX ORDER — HALOPERIDOL DECANOATE 100 MG/ML
1 INJECTION INTRAMUSCULAR ONCE
Refills: 0 | Status: COMPLETED | OUTPATIENT
Start: 2019-10-21 | End: 2019-10-21

## 2019-10-21 RX ORDER — ENOXAPARIN SODIUM 100 MG/ML
40 INJECTION SUBCUTANEOUS DAILY
Refills: 0 | Status: DISCONTINUED | OUTPATIENT
Start: 2019-10-21 | End: 2019-10-29

## 2019-10-21 RX ORDER — DOCUSATE SODIUM 100 MG
100 CAPSULE ORAL
Refills: 0 | Status: DISCONTINUED | OUTPATIENT
Start: 2019-10-21 | End: 2019-10-29

## 2019-10-21 RX ADMIN — TAMSULOSIN HYDROCHLORIDE 0.4 MILLIGRAM(S): 0.4 CAPSULE ORAL at 21:55

## 2019-10-21 RX ADMIN — ENOXAPARIN SODIUM 40 MILLIGRAM(S): 100 INJECTION SUBCUTANEOUS at 11:37

## 2019-10-21 RX ADMIN — Medication 1 TABLET(S): at 11:36

## 2019-10-21 RX ADMIN — SENNA PLUS 1 TABLET(S): 8.6 TABLET ORAL at 18:14

## 2019-10-21 RX ADMIN — HALOPERIDOL DECANOATE 0.5 MILLIGRAM(S): 100 INJECTION INTRAMUSCULAR at 08:47

## 2019-10-21 RX ADMIN — AMLODIPINE BESYLATE 5 MILLIGRAM(S): 2.5 TABLET ORAL at 06:06

## 2019-10-21 RX ADMIN — ATORVASTATIN CALCIUM 20 MILLIGRAM(S): 80 TABLET, FILM COATED ORAL at 21:55

## 2019-10-21 RX ADMIN — HALOPERIDOL DECANOATE 1 MILLIGRAM(S): 100 INJECTION INTRAMUSCULAR at 10:47

## 2019-10-21 RX ADMIN — Medication 12.5 MILLIGRAM(S): at 06:06

## 2019-10-21 RX ADMIN — DONEPEZIL HYDROCHLORIDE 5 MILLIGRAM(S): 10 TABLET, FILM COATED ORAL at 21:55

## 2019-10-21 RX ADMIN — Medication 100 MILLIGRAM(S): at 18:14

## 2019-10-21 NOTE — PROGRESS NOTE ADULT - SUBJECTIVE AND OBJECTIVE BOX
Hospital Day:  7d    Subjective:    Patient is a 87y old  Male who presents with a chief complaint of Hallucinations (20 Oct 2019 16:04)      Past Medical Hx:   H/O hypokalemia  Dyslipidemia  Hypertension  Prostate cancer    Past Sx:    Allergies:  No Known Allergies    Current Meds:   Stand Meds:  amLODIPine   Tablet 5 milliGRAM(s) Oral daily  atorvastatin 20 milliGRAM(s) Oral at bedtime  calcium carbonate 1250 mG  + Vitamin D (OsCal 500 + D) 1 Tablet(s) Oral daily  dextrose 5% + sodium chloride 0.9%. 1000 milliLiter(s) (75 mL/Hr) IV Continuous <Continuous>  donepezil 5 milliGRAM(s) Oral at bedtime  hydrochlorothiazide 12.5 milliGRAM(s) Oral daily  multivitamin 1 Tablet(s) Oral daily  penicillin   G benzathine Injectable 1.2 Million Unit(s) IntraMuscular every 7 days  penicillin   G benzathine Injectable 1.2 Million Unit(s) IntraMuscular every 7 days  tamsulosin 0.4 milliGRAM(s) Oral at bedtime    PRN Meds:    HOME MEDICATIONS:  amLODIPine 5 mg oral tablet: 1 tab(s) orally once a day  atorvastatin 20 mg oral tablet: 1 tab(s) orally once a day  calcium (as citrate)-vitamin D 250 mg-500 intl units oral tablet, chewable: 2 tab(s) orally 2 times a day (with meals)  hydroCHLOROthiazide 12.5 mg oral capsule: 1 cap(s) orally once a day  tamsulosin 0.4 mg oral capsule: 1 cap(s) orally once a day      Vital Signs:   T(F): 98 (10-21-19 @ 04:57), Max: 98.1 (10-20-19 @ 14:57)  HR: 67 (10-21-19 @ 06:40) (56 - 67)  BP: 153/67 (10-21-19 @ 06:40) (114/64 - 176/77)  RR: 20 (10-21-19 @ 04:57) (20 - 20)  SpO2: 99% (10-20-19 @ 19:40) (96% - 99%)      10-20-19 @ 07:01  -  10-21-19 @ 07:00  --------------------------------------------------------  IN: 0 mL / OUT: 700 mL / NET: -700 mL        Physical Exam:   GENERAL: NAD  HEENT: NCAT  CHEST/LUNG: CTAB  HEART: Regular rate and rhythm; s1 s2 appreciated, No murmurs, rubs, or gallops  ABDOMEN: Soft, Nontender, Nondistended; Bowel sounds present  EXTREMITIES: No LE edema b/l  NERVOUS SYSTEM:  Alert & Oriented X3        Labs:                         10.9   5.70  )-----------( 221      ( 20 Oct 2019 06:25 )             32.0       20 Oct 2019 06:25    144    |  110    |  12     ----------------------------<  100    3.6     |  23     |  0.9      Ca    7.5        20 Oct 2019 06:25  Mg     2.0       20 Oct 2019 06:25    TPro  5.3    /  Alb  2.9    /  TBili  0.2    /  DBili  x      /  AST  34     /  ALT  17     /  AlkPhos  41     20 Oct 2019 06:25 Hospital Day:  7d    Subjective:    Patient is a 87y old  Male who presents with a chief complaint of Hallucinations, found to have 3 syphilis. No acute events overnight, in no distress this am, however disoriented, delirious, AAOx0.        Past Medical Hx:   H/O hypokalemia  Dyslipidemia  Hypertension  Prostate cancer    Past Sx:    Allergies:  No Known Allergies    Current Meds:   [facility] Meds:  amLODIPine   Tablet 5 milliGRAM(s) Oral daily  atorvastatin 20 milliGRAM(s) Oral at bedtime  calcium carbonate 1250 mG  + Vitamin D (OsCal 500 + D) 1 Tablet(s) Oral daily  dextrose 5% + sodium chloride 0.9%. 1000 milliLiter(s) (75 mL/Hr) IV Continuous <Continuous>  donepezil 5 milliGRAM(s) Oral at bedtime  hydrochlorothiazide 12.5 milliGRAM(s) Oral daily  multivitamin 1 Tablet(s) Oral daily  penicillin   G benzathine Injectable 1.2 Million Unit(s) IntraMuscular every 7 days  penicillin   G benzathine Injectable 1.2 Million Unit(s) IntraMuscular every 7 days  tamsulosin 0.4 milliGRAM(s) Oral at bedtime    PRN Meds:    HOME MEDICATIONS:  amLODIPine 5 mg oral tablet: 1 tab(s) orally once a day  atorvastatin 20 mg oral tablet: 1 tab(s) orally once a day  calcium (as citrate)-vitamin D 250 mg-500 intl units oral tablet, chewable: 2 tab(s) orally 2 times a day (with meals)  hydroCHLOROthiazide 12.5 mg oral capsule: 1 cap(s) orally once a day  tamsulosin 0.4 mg oral capsule: 1 cap(s) orally once a day      Vital Signs:   T(F): 98 (10-21-19 @ 04:57), Max: 98.1 (10-20-19 @ 14:57)  HR: 67 (10-21-19 @ 06:40) (56 - 67)  BP: 153/67 (10-21-19 @ 06:40) (114/64 - 176/77)  RR: 20 (10-21-19 @ 04:57) (20 - 20)  SpO2: 99% (10-20-19 @ 19:40) (96% - 99%)      10-20-19 @ 07:01  -  10-21-19 @ 07:00  --------------------------------------------------------  IN: 0 mL / OUT: 700 mL / NET: -700 mL        Physical Exam:   GENERAL: NAD  HEENT: NCAT  CHEST/LUNG: CTAB  HEART: Regular rate and rhythm; s1 s2 appreciated, No murmurs, rubs, or gallops  ABDOMEN: Soft, Nontender, Nondistended; Bowel sounds present  EXTREMITIES: No LE edema b/l  NERVOUS SYSTEM:  Alert & Oriented X0, delirious         Labs:                         10.9   5.70  )-----------( 221      ( 20 Oct 2019 06:25 )             32.0       20 Oct 2019 06:25    144    |  110    |  12     ----------------------------<  100    3.6     |  23     |  0.9      Ca    7.5        20 Oct 2019 06:25  Mg     2.0       20 Oct 2019 06:25    TPro  5.3    /  Alb  2.9    /  TBili  0.2    /  DBili  x      /  AST  34     /  ALT  17     /  AlkPhos  41     20 Oct 2019 06:25

## 2019-10-21 NOTE — PROGRESS NOTE BEHAVIORAL HEALTH - SUMMARY
Mr Stearns is an 87 year old man with no history of  a psychiatric illness who was admitted to the medical floor for altered mental status. Psychiatry consult was initially called for the evaluation of visual hallucinations.  At that time, patient's visual hallucinations were considered to be as a result of a delirium versus sundowning and since patient was in good behavioral control at that time, no psychotropic intervention was recommended and the medical team was advised to treat all underlying acute medical problems. The psychiatry team was recalled today for the evaluation of agitation .   Patient appears more delirious than the last time he was seen by the psychiatry team.  it also appears that there is associated agitation although reported in the last 1 day that is well controlled on haldol. The worsening delirium is most likely as a result of the ongoing syphilitic infection versus other medical causes of delirium versus sundowning. Patient does not appear acutely psychotic , manic or depressed. he does not have suicidal ideations, intent or plan.   At this time, patient is not considered an imminent danger to himself or others and does not need inpatient psychiatric hospitalization. He continue will benefit from behavioral interventions and environmental modifications to help him with his orientation. However , in the mean time, patient may benefit on low dose haldol on an as needed basis for agitation. Please note that this medication can be given via the intramuscular route if patient is considered a danger to himself or others . Patient will also benefit from a repeat Delirium screen to evaluate for and potentially treat any new medical cause for his worsening delirium.

## 2019-10-21 NOTE — PROGRESS NOTE BEHAVIORAL HEALTH - RISK ASSESSMENT
The risk factors for suicide in this patient are his old age, recent loss of his wife, multiple chronic medical problems however , patient has good social support, has no history of a psychiatric illness , no acute mood episode , no current suicidal ideations, intent or plan.

## 2019-10-21 NOTE — PROGRESS NOTE ADULT - ASSESSMENT
Assessment and Plan    Patient is a 87y old Male who presents with a chief complaint of Currently admitted to medicine with the primary diagnosis of Hallucinations    #Visual Hallucinations 2/2 Delirium likely due to Dehydration vs. Neurosyphilis   CTH revealed cerebellar and cerebral atrophy-->Not significant in a patient of this age   B12, Folate, TSH wnl.   RPR negative, but FTA-ABS positive - bneing treated as tertiary syphilis  , b/l intragluteal penicillin g ordered    CSF fluid studies-->Negative for cell count.  Still pending VRDL, Lymes, cryptococcus, acid fast, fungal and [place].   LP: negative     # Hyperlipidemia:   c/w statin     # Hypertension   (Stable at 142/70)  c/w amlodipine and HCTZ    # Metastatic prostate cancer -  Patient was on Zytiga however possibly stopped recently. Will clarify as to why he stopped.  c/w tamsulosin     GI PPX: Not indicated    DVT PPX: Lovenox   Activity: OOB  Dispo: Home w/Home care A 87y old Male who presents with a chief complaint of Currently admitted to medicine with the primary diagnosis of Hallucinations    #Visual Hallucinations 2/2 Delirium likely due to Dehydration vs. Neurosyphilis   - CTH revealed cerebellar and cerebral atrophy-->Not significant in a patient of this age   - B12, Folate, TSH wnl.   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     # Hyperlipidemia  c/w statin     # Hypertension   (Stable at 142/70)  c/w amlodipine and HCTZ    # Metastatic prostate cancer -  Patient was on Zytiga however possibly stopped recently. Will clarify as to why he stopped.  c/w tamsulosin     GI PPX: Not indicated    DVT PPX: Lovenox   Activity: OOB  Dispo: Home w/Home care A 87y old Male who presents with a chief complaint of Currently admitted to medicine with the primary diagnosis of Hallucinations    #Visual Hallucinations 2/2 Delirium likely due to Dehydration vs. Neurosyphilis   - CTH revealed cerebellar and cerebral atrophy-->Not significant in a patient of this age   - B12, Folate, TSH wnl.   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   - Haldol 0.5 for agitation    # Hyperlipidemia  c/w statin     # Hypertension   (Stable at 142/70)  c/w amlodipine and HCTZ    # Metastatic prostate cancer -  Patient was on Zytiga however possibly stopped recently. Will clarify as to why he stopped.  c/w tamsulosin     GI PPX: Not indicated    DVT PPX: Lovenox   Activity: OOB  Dispo: Short term rehab placement

## 2019-10-21 NOTE — PROGRESS NOTE BEHAVIORAL HEALTH - NSBHCONSULTMEDAGITATION_PSY_A_CORE FT
Haldol 0.5-1mg P.O Q 6 hrs PRN for agitation, may consider Haldol 0.5mg P.o BID if patient does well on the haldol PRN and agitation continues despite treatment of medical problems. Please speak to patient's son about this .

## 2019-10-21 NOTE — PROGRESS NOTE ADULT - SUBJECTIVE AND OBJECTIVE BOX
Patient was seen and examined. Spoke with RN. Chart reviewed.  Patient is hallucinating/manic, screaming in the [address]   Vital Signs Last 24 Hrs  T(F): 98 (21 Oct 2019 04:57), Max: 98.1 (20 Oct 2019 14:57)  HR: 67 (21 Oct 2019 06:40) (56 - 67)  BP: 153/67 (21 Oct 2019 06:40) (114/64 - 176/77)  SpO2: 98% (21 Oct 2019 07:53) (98% - 99%)  MEDICATIONS  (STANDING):  amLODIPine   Tablet 5 milliGRAM(s) Oral daily  atorvastatin 20 milliGRAM(s) Oral at bedtime  calcium carbonate 1250 mG  + Vitamin D (OsCal 500 + D) 1 Tablet(s) Oral daily  dextrose 5% + sodium chloride 0.9%. 1000 milliLiter(s) (75 mL/Hr) IV Continuous <Continuous>  donepezil 5 milliGRAM(s) Oral at bedtime  enoxaparin Injectable 40 milliGRAM(s) SubCutaneous daily  hydrochlorothiazide 12.5 milliGRAM(s) Oral daily  multivitamin 1 Tablet(s) Oral daily  penicillin   G benzathine Injectable 1.2 Million Unit(s) IntraMuscular every 7 days  tamsulosin 0.4 milliGRAM(s) Oral at bedtime    MEDICATIONS  (PRN):    Labs:                        11.5   5.96  )-----------( 185      ( 21 Oct 2019 06:18 )             33.9                         10.9   5.70  )-----------( 221      ( 20 Oct 2019 06:25 )             32.0     21 Oct 2019 06:18    141    |  107    |  15     ----------------------------<  106    4.0     |  22     |  0.8    20 Oct 2019 06:25    144    |  110    |  12     ----------------------------<  100    3.6     |  23     |  0.9      Ca    8.1        21 Oct 2019 06:18  Ca    7.5        20 Oct 2019 06:25  Mg     1.9       21 Oct 2019 06:18  Mg     2.0       20 Oct 2019 06:25    TPro  5.9    /  Alb  3.2    /  TBili  0.3    /  DBili  x      /  AST  34     /  ALT  18     /  AlkPhos  42     21 Oct 2019 06:18  TPro  5.3    /  Alb  2.9    /  TBili  0.2    /  DBili  x      /  AST  34     /  ALT  17     /  AlkPhos  41     20 Oct 2019 06:25          Culture - Fungal, CSF (collected 18 Oct 2019 16:24)  Source: .CSF CSF  Preliminary Report (19 Oct 2019 15:05):    Testing in progress    Culture - CSF with Gram Stain (collected 18 Oct 2019 16:24)  Source: .CSF CSF  Gram Stain (19 Oct 2019 03:48):    polymorphonuclear leukocytes seen    No organisms seen    by cytocentrifuge  Preliminary Report (20 Oct 2019 06:51):    No growth    Culture - Acid Fast (collected 18 Oct 2019 16:24)      General: comfortable, NAD  Neurology: a&a, confused, nonfocal  Head:  Normocephalic, atraumatic  ENT:  Mucosa moist, no ulcerations  Neck:  Supple, no JVD,   Skin: no breakdowns (as per RN)  Resp: CTA B/L  CV: RRR, S1S2,   GI: Soft, NT, bowel sounds  MS: No edema, + peripheral pulses, FROM all 4 extremity      A/P:  tertiary syphilis   dementia  encephalopathy    psych eval to help with management   abx as per ID  neuro f/u   hydration   fall precautions   maintain safety   continue current care   d/c planning when cleared by neuro and psych    DVT prophylaxis  Decubitus prevention- all measures as per RN protocol  Please call or text me with any questions or updates Patient was seen and examined. Spoke with RN. Chart reviewed.  Patient is hallucinating screaming in the [address]   Vital Signs Last 24 Hrs  T(F): 98 (21 Oct 2019 04:57), Max: 98.1 (20 Oct 2019 14:57)  HR: 67 (21 Oct 2019 06:40) (56 - 67)  BP: 153/67 (21 Oct 2019 06:40) (114/64 - 176/77)  SpO2: 98% (21 Oct 2019 07:53) (98% - 99%)  MEDICATIONS  (STANDING):  amLODIPine   Tablet 5 milliGRAM(s) Oral daily  atorvastatin 20 milliGRAM(s) Oral at bedtime  calcium carbonate 1250 mG  + Vitamin D (OsCal 500 + D) 1 Tablet(s) Oral daily  dextrose 5% + sodium chloride 0.9%. 1000 milliLiter(s) (75 mL/Hr) IV Continuous <Continuous>  donepezil 5 milliGRAM(s) Oral at bedtime  enoxaparin Injectable 40 milliGRAM(s) SubCutaneous daily  hydrochlorothiazide 12.5 milliGRAM(s) Oral daily  multivitamin 1 Tablet(s) Oral daily  penicillin   G benzathine Injectable 1.2 Million Unit(s) IntraMuscular every 7 days  tamsulosin 0.4 milliGRAM(s) Oral at bedtime    MEDICATIONS  (PRN):    Labs:                        11.5   5.96  )-----------( 185      ( 21 Oct 2019 06:18 )             33.9                         10.9   5.70  )-----------( 221      ( 20 Oct 2019 06:25 )             32.0     21 Oct 2019 06:18    141    |  107    |  15     ----------------------------<  106    4.0     |  22     |  0.8    20 Oct 2019 06:25    144    |  110    |  12     ----------------------------<  100    3.6     |  23     |  0.9      Ca    8.1        21 Oct 2019 06:18  Ca    7.5        20 Oct 2019 06:25  Mg     1.9       21 Oct 2019 06:18  Mg     2.0       20 Oct 2019 06:25    TPro  5.9    /  Alb  3.2    /  TBili  0.3    /  DBili  x      /  AST  34     /  ALT  18     /  AlkPhos  42     21 Oct 2019 06:18  TPro  5.3    /  Alb  2.9    /  TBili  0.2    /  DBili  x      /  AST  34     /  ALT  17     /  AlkPhos  41     20 Oct 2019 06:25          Culture - Fungal, CSF (collected 18 Oct 2019 16:24)  Source: .CSF CSF  Preliminary Report (19 Oct 2019 15:05):    Testing in progress    Culture - CSF with Gram Stain (collected 18 Oct 2019 16:24)  Source: .CSF CSF  Gram Stain (19 Oct 2019 03:48):    polymorphonuclear leukocytes seen    No organisms seen    by cytocentrifuge  Preliminary Report (20 Oct 2019 06:51):    No growth    Culture - Acid Fast (collected 18 Oct 2019 16:24)      General: comfortable, NAD  Neurology: a&a, confused, nonfocal  Head:  Normocephalic, atraumatic  ENT:  Mucosa moist, no ulcerations  Neck:  Supple, no JVD,   Skin: no breakdowns (as per RN)  Resp: CTA B/L  CV: RRR, S1S2,   GI: Soft, NT, bowel sounds  MS: No edema, + peripheral pulses, FROM all 4 extremity      A/P:  tertiary syphilis   dementia  encephalopathy    psych eval to help with management   abx as per ID  neuro f/u   hydration   fall precautions   maintain safety   continue current care   d/c planning when cleared by neuro and psych    DVT prophylaxis  Decubitus prevention- all measures as per RN protocol  Please call or text me with any questions or updates

## 2019-10-21 NOTE — PROGRESS NOTE BEHAVIORAL HEALTH - NSBHFUPINTERVALHXFT_PSY_A_CORE
Patient seen and interviewed today.   As per the medical team, patient has become increasingly agitated and combative and at a point threw his food tray at staff.   He reports that he is not feeling well. Patient reports that he is no longer seeing the woman that he seeing on presentation to the hospital and neither is he seeing skeletons however he reports that feels like he is in a box and cant get out . he reports feeling unwell but can not describe exactly what is wrong.   Patient did not know what day of the week today is although he knows that the month is October and he believed that we are in [facility].   As per nurse taking care of the patient, she reports that as of yesterday, patient was well oriented to time, person or place. She reports that he remembered his son's phone number , spoke to the son and was interacting appropriately with staff. She however reports that she was informed by the night nurse that patient became increasingly confused yesterday evening and has since been agitated although he calmed down since he was given haldol.

## 2019-10-21 NOTE — PROGRESS NOTE BEHAVIORAL HEALTH - NSBHCHARTREVIEWVS_PSY_A_CORE FT
Vital Signs Last 24 Hrs  T(C): 35.6 (21 Oct 2019 12:36), Max: 36.7 (20 Oct 2019 14:57)  T(F): 96.1 (21 Oct 2019 12:36), Max: 98.1 (20 Oct 2019 14:57)  HR: 46 (21 Oct 2019 12:36) (46 - 67)  BP: 167/72 (21 Oct 2019 12:36) (114/64 - 176/77)  BP(mean): --  RR: 18 (21 Oct 2019 12:36) (18 - 20)  SpO2: 98% (21 Oct 2019 07:53) (98% - 99%)

## 2019-10-21 NOTE — PROGRESS NOTE BEHAVIORAL HEALTH - NSBHCONSULTFOLLOWAFTERCARE_PSY_A_CORE FT
Patient can follow up with the psychiatrist affiliated to the nursing home that he is being discharged to.

## 2019-10-21 NOTE — PROGRESS NOTE BEHAVIORAL HEALTH - NSBHCHARTREVIEWLAB_PSY_A_CORE FT
11.5   5.96  )-----------( 185      ( 21 Oct 2019 06:18 )             33.9       10-21    141  |  107  |  15  ----------------------------<  106<H>  4.0   |  22  |  0.8    Ca    8.1<L>      21 Oct 2019 06:18  Mg     1.9     10-21    TPro  5.9<L>  /  Alb  3.2<L>  /  TBili  0.3  /  DBili  x   /  AST  34  /  ALT  18  /  AlkPhos  42  10-21

## 2019-10-22 LAB
B BURGDOR DNA SPEC QL NAA+PROBE: NEGATIVE — SIGNIFICANT CHANGE UP
CULTURE RESULTS: NO GROWTH — SIGNIFICANT CHANGE UP
SPECIMEN SOURCE: SIGNIFICANT CHANGE UP

## 2019-10-22 RX ORDER — HALOPERIDOL DECANOATE 100 MG/ML
0.5 INJECTION INTRAMUSCULAR ONCE
Refills: 0 | Status: COMPLETED | OUTPATIENT
Start: 2019-10-22 | End: 2019-10-22

## 2019-10-22 RX ORDER — HALOPERIDOL DECANOATE 100 MG/ML
0.5 INJECTION INTRAMUSCULAR DAILY
Refills: 0 | Status: DISCONTINUED | OUTPATIENT
Start: 2019-10-22 | End: 2019-10-29

## 2019-10-22 RX ADMIN — ENOXAPARIN SODIUM 40 MILLIGRAM(S): 100 INJECTION SUBCUTANEOUS at 11:35

## 2019-10-22 RX ADMIN — DONEPEZIL HYDROCHLORIDE 5 MILLIGRAM(S): 10 TABLET, FILM COATED ORAL at 20:52

## 2019-10-22 RX ADMIN — SODIUM CHLORIDE 75 MILLILITER(S): 9 INJECTION, SOLUTION INTRAVENOUS at 11:51

## 2019-10-22 RX ADMIN — Medication 12.5 MILLIGRAM(S): at 05:37

## 2019-10-22 RX ADMIN — Medication 1 TABLET(S): at 11:44

## 2019-10-22 RX ADMIN — Medication 100 MILLIGRAM(S): at 05:37

## 2019-10-22 RX ADMIN — ATORVASTATIN CALCIUM 20 MILLIGRAM(S): 80 TABLET, FILM COATED ORAL at 20:52

## 2019-10-22 RX ADMIN — SENNA PLUS 1 TABLET(S): 8.6 TABLET ORAL at 11:45

## 2019-10-22 RX ADMIN — HALOPERIDOL DECANOATE 0.5 MILLIGRAM(S): 100 INJECTION INTRAMUSCULAR at 00:34

## 2019-10-22 RX ADMIN — SODIUM CHLORIDE 75 MILLILITER(S): 9 INJECTION, SOLUTION INTRAVENOUS at 01:01

## 2019-10-22 RX ADMIN — Medication 1 TABLET(S): at 11:45

## 2019-10-22 RX ADMIN — Medication 100 MILLIGRAM(S): at 17:39

## 2019-10-22 RX ADMIN — TAMSULOSIN HYDROCHLORIDE 0.4 MILLIGRAM(S): 0.4 CAPSULE ORAL at 20:51

## 2019-10-22 RX ADMIN — AMLODIPINE BESYLATE 5 MILLIGRAM(S): 2.5 TABLET ORAL at 05:37

## 2019-10-22 NOTE — DIETITIAN INITIAL EVALUATION ADULT. - PERTINENT MEDS FT
lovenox, abx, colace, senna, norvasc, D5NS 75ml/h, senna, lipitor, oscal + D, hydrochlorothiazide, MVI, flomax

## 2019-10-22 NOTE — PROGRESS NOTE ADULT - SUBJECTIVE AND OBJECTIVE BOX
OKSANA BRANHAM  87y, Male    All available historical data reviewed    OVERNIGHT EVENTS:  none  ROS:  could not be obtained    VITALS:  T(F): 96.9, Max: 97.4 (10-21-19 @ 20:53)  HR: 66  BP: 128/70  RR: 18Vital Signs Last 24 Hrs  T(C): 36.1 (22 Oct 2019 05:33), Max: 36.3 (21 Oct 2019 20:53)  T(F): 96.9 (22 Oct 2019 05:33), Max: 97.4 (21 Oct 2019 20:53)  HR: 66 (22 Oct 2019 05:33) (46 - 66)  BP: 128/70 (22 Oct 2019 05:33) (128/70 - 167/72)  BP(mean): --  RR: 18 (22 Oct 2019 05:33) (16 - 18)  SpO2: 100% (21 Oct 2019 13:59) (100% - 100%)    TESTS & MEASUREMENTS:                        11.5   5.96  )-----------( 185      ( 21 Oct 2019 06:18 )             33.9     10-21    141  |  107  |  15  ----------------------------<  106<H>  4.0   |  22  |  0.8    Ca    8.1<L>      21 Oct 2019 06:18  Mg     1.9     10-21    TPro  5.9<L>  /  Alb  3.2<L>  /  TBili  0.3  /  DBili  x   /  AST  34  /  ALT  18  /  AlkPhos  42  10-21    LIVER FUNCTIONS - ( 21 Oct 2019 06:18 )  Alb: 3.2 g/dL / Pro: 5.9 g/dL / ALK PHOS: 42 U/L / ALT: 18 U/L / AST: 34 U/L / GGT: x             Culture - Fungal, CSF (collected 10-18-19 @ 16:24)  Source: .CSF CSF  Preliminary Report (10-19-19 @ 15:05):    Testing in progress    Culture - CSF with Gram Stain (collected 10-18-19 @ 16:24)  Source: .CSF CSF  Gram Stain (10-19-19 @ 03:48):    polymorphonuclear leukocytes seen    No organisms seen    by cytocentrifuge  Final Report (10-22-19 @ 07:01):    No growth    Culture - Acid Fast (collected 10-18-19 @ 16:24)            RADIOLOGY & ADDITIONAL TESTS:  Personal review of radiological diagnostics performed  Echo and EKG results noted when applicable.     ANTIBIOTICS:  penicillin   G benzathine Injectable 1.2 Million Unit(s) IntraMuscular every 7 days

## 2019-10-22 NOTE — DIETITIAN INITIAL EVALUATION ADULT. - PHYSICAL APPEARANCE
RD unable to perform nutrition-focused physical exam as pt becomes agitated. RD can observe severe temple and clavicle muscle wasting from bedside. Pt likely to meet PCM, need further hx. Wt is 128#. Ht: 71" per pt report. BMI: 17.9. IBW: 172#+/-10%

## 2019-10-22 NOTE — DIETITIAN INITIAL EVALUATION ADULT. - DIET TYPE
Continue DASH/TLC diet modifier. Add Ensure Enlive BID. Order kcal count. Ensure 1:1 feeds. Order new wt. RD to reattempt nutrition hx upon f/u.

## 2019-10-22 NOTE — PROGRESS NOTE ADULT - SUBJECTIVE AND OBJECTIVE BOX
Hospital Day:  8d    Subjective:    Patient is a 87y old  Male who presents with a chief complaint of Hallucinations (21 Oct 2019 11:05)      Past Medical Hx:   H/O hypokalemia  Dyslipidemia  Hypertension  Prostate cancer    Past Sx:    Allergies:  No Known Allergies    Current Meds:   [facility] Meds:  amLODIPine   Tablet 5 milliGRAM(s) Oral daily  atorvastatin 20 milliGRAM(s) Oral at bedtime  calcium carbonate 1250 mG  + Vitamin D (OsCal 500 + D) 1 Tablet(s) Oral daily  dextrose 5% + sodium chloride 0.9%. 1000 milliLiter(s) (75 mL/Hr) IV Continuous <Continuous>  docusate sodium 100 milliGRAM(s) Oral two times a day  donepezil 5 milliGRAM(s) Oral at bedtime  enoxaparin Injectable 40 milliGRAM(s) SubCutaneous daily  hydrochlorothiazide 12.5 milliGRAM(s) Oral daily  multivitamin 1 Tablet(s) Oral daily  penicillin   G benzathine Injectable 1.2 Million Unit(s) IntraMuscular every 7 days  senna 1 Tablet(s) Oral daily  tamsulosin 0.4 milliGRAM(s) Oral at bedtime    PRN Meds:    HOME MEDICATIONS:  amLODIPine 5 mg oral tablet: 1 tab(s) orally once a day  atorvastatin 20 mg oral tablet: 1 tab(s) orally once a day  calcium (as citrate)-vitamin D 250 mg-500 intl units oral tablet, chewable: 2 tab(s) orally 2 times a day (with meals)  hydroCHLOROthiazide 12.5 mg oral capsule: 1 cap(s) orally once a day  tamsulosin 0.4 mg oral capsule: 1 cap(s) orally once a day      Vital Signs:   T(F): 96.9 (10-22-19 @ 05:33), Max: 97.4 (10-21-19 @ 20:53)  HR: 66 (10-22-19 @ 05:33) (46 - 66)  BP: 128/70 (10-22-19 @ 05:33) (128/70 - 167/72)  RR: 18 (10-22-19 @ 05:33) (16 - 18)  SpO2: 100% (10-21-19 @ 13:59) (98% - 100%)      10-21-19 @ 07:01  -  10-22-19 @ 07:00  --------------------------------------------------------  IN: 0 mL / OUT: 850 mL / NET: -850 mL        Physical Exam:   GENERAL: NAD  HEENT: NCAT  CHEST/LUNG: CTAB  HEART: Regular rate and rhythm; s1 s2 appreciated, No murmurs, rubs, or gallops  ABDOMEN: Soft, Nontender, Nondistended; Bowel sounds present  EXTREMITIES: No LE edema b/l  NERVOUS SYSTEM:  Alert & Oriented X3        Labs:                         11.5   5.96  )-----------( 185      ( 21 Oct 2019 06:18 )             33.9       21 Oct 2019 06:18    141    |  107    |  15     ----------------------------<  106    4.0     |  22     |  0.8      Ca    8.1        21 Oct 2019 06:18  Mg     1.9       21 Oct 2019 06:18    TPro  5.9    /  Alb  3.2    /  TBili  0.3    /  DBili  x      /  AST  34     /  ALT  18     /  AlkPhos  42     21 Oct 2019 06:18 Hospital Day:  8d    Subjective:    Patient is a 87y old  Male who presents with a chief complaint of Hallucinations found to have tertiary syphilis. No acute events overnight, however pt agitated and needed haldol. In no acute distress this am, resting, RA, no torres.       Past Medical Hx:   H/O hypokalemia  Dyslipidemia  Hypertension  Prostate cancer    Past Sx:    Allergies:  No Known Allergies    Current Meds:   Stand Meds:  amLODIPine   Tablet 5 milliGRAM(s) Oral daily  atorvastatin 20 milliGRAM(s) Oral at bedtime  calcium carbonate 1250 mG  + Vitamin D (OsCal 500 + D) 1 Tablet(s) Oral daily  dextrose 5% + sodium chloride 0.9%. 1000 milliLiter(s) (75 mL/Hr) IV Continuous <Continuous>  docusate sodium 100 milliGRAM(s) Oral two times a day  donepezil 5 milliGRAM(s) Oral at bedtime  enoxaparin Injectable 40 milliGRAM(s) SubCutaneous daily  hydrochlorothiazide 12.5 milliGRAM(s) Oral daily  multivitamin 1 Tablet(s) Oral daily  penicillin   G benzathine Injectable 1.2 Million Unit(s) IntraMuscular every 7 days  senna 1 Tablet(s) Oral daily  tamsulosin 0.4 milliGRAM(s) Oral at bedtime    PRN Meds:    HOME MEDICATIONS:  amLODIPine 5 mg oral tablet: 1 tab(s) orally once a day  atorvastatin 20 mg oral tablet: 1 tab(s) orally once a day  calcium (as citrate)-vitamin D 250 mg-500 intl units oral tablet, chewable: 2 tab(s) orally 2 times a day (with meals)  hydroCHLOROthiazide 12.5 mg oral capsule: 1 cap(s) orally once a day  tamsulosin 0.4 mg oral capsule: 1 cap(s) orally once a day      Vital Signs:   T(F): 96.9 (10-22-19 @ 05:33), Max: 97.4 (10-21-19 @ 20:53)  HR: 66 (10-22-19 @ 05:33) (46 - 66)  BP: 128/70 (10-22-19 @ 05:33) (128/70 - 167/72)  RR: 18 (10-22-19 @ 05:33) (16 - 18)  SpO2: 100% (10-21-19 @ 13:59) (98% - 100%)      10-21-19 @ 07:01  -  10-22-19 @ 07:00  --------------------------------------------------------  IN: 0 mL / OUT: 850 mL / NET: -850 mL        Physical Exam:   GENERAL: NAD, appears stated age, frail  HEENT: NCAT  CHEST/LUNG: CTAB  HEART: Regular rate and rhythm; s1 s2 appreciated, No murmurs, rubs, or gallops  ABDOMEN: Soft, Nontender, Nondistended; Bowel sounds present  EXTREMITIES: No LE edema b/l  NERVOUS SYSTEM:  Alert & Oriented X1 (name)        Labs:                         11.5   5.96  )-----------( 185      ( 21 Oct 2019 06:18 )             33.9       21 Oct 2019 06:18    141    |  107    |  15     ----------------------------<  106    4.0     |  22     |  0.8      Ca    8.1        21 Oct 2019 06:18  Mg     1.9       21 Oct 2019 06:18    TPro  5.9    /  Alb  3.2    /  TBili  0.3    /  DBili  x      /  AST  34     /  ALT  18     /  AlkPhos  42     21 Oct 2019 06:18

## 2019-10-22 NOTE — DIETITIAN INITIAL EVALUATION ADULT. - OTHER INFO
Pt presented to ED for hallucinations 2/2 delirium, likely d/t dehydration vs neurosyphilis. Hx HLD and HTN noted. Mets prostate cancer noted.

## 2019-10-22 NOTE — DIETITIAN INITIAL EVALUATION ADULT. - FACTORS AFF FOOD INTAKE
Pt is not good historian. RD attempted to reach pt's son but was unsuccessful. Will reattempt to obtain nutrition hx upon f/u. Pt with a poor appetite and PO intake per PCA and RN. Pt is 1:1 feeds. Pt would benefit from PO supplements. Rec 3day kcal count to assess intake. Last BM 10/22, no GI s/s reported by PCA.

## 2019-10-22 NOTE — PROGRESS NOTE ADULT - ASSESSMENT
· Assessment	  A 87y old Male who presents with a chief complaint of Currently admitted to medicine with the primary diagnosis of Hallucinations    #Visual Hallucinations 2/2 Delirium likely due to Dehydration vs. Neurosyphilis   - CTH revealed cerebellar and cerebral atrophy-->Not significant in a patient of this age   - B12, Folate, TSH wnl.   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   - Haldol 0.5 for agitation    # Hyperlipidemia  c/w statin     # Hypertension  - Labile in the 120s-170s  - c/w amlodipine and HCTZ    # Metastatic prostate cancer -  Patient was on Zytiga however possibly stopped recently. Will clarify as to why he stopped.  c/w tamsulosin     GI PPX: Not indicated    DVT PPX: Lovenox   Activity: OOB  Dispo: Short term rehab placement A 87y old Male who presents with a chief complaint of Currently admitted to medicine with the primary diagnosis of Hallucinations    #Visual Hallucinations 2/2 Delirium likely due to Dehydration vs. Neurosyphilis   - CTH revealed cerebellar and cerebral atrophy-->Not significant in a patient of this age   - B12, Folate, TSH wnl.   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   - Haldol IM 0.5 PRN for agitation    # Hyperlipidemia  c/w statin     # Hypertension  - remains labile in the 120s-170s  - c/w amlodipine and HCTZ    # Metastatic prostate cancer -  Patient was on Zytiga, d/c for unknown reason  c/w tamsulosin     GI PPX: Not indicated    DVT PPX: Lovenox   Activity: ambulate as tolerated   Dispo: Short term rehab placement

## 2019-10-22 NOTE — DIETITIAN INITIAL EVALUATION ADULT. - ENERGY NEEDS
estimated energy needs: 1920-2030kcal (MSJx1.5AF vs 30-35kcal/kg) mets prostate ca, underwt, likely to meet PCM criteria  estimated protein needs:75-87g (1.3-1.5g/kg) as above  estimated fluid needs: 1ml/kcal or per LIP

## 2019-10-22 NOTE — PROGRESS NOTE ADULT - ASSESSMENT
· Assessment		   87y old  Male who presents with a chief complaint of Hallucinations.    IMPRESSION:  # Syphilis unknown duration= tertiary syphilis  Given that the FTA is positive he has syphilis  CSF not consistent with neurosyphilis or an infectious process  will treat as tertiary syphilis    RECOMMENDATIONS:  Benzathine PCN 1.2 million units im ( intragluteal ) in each buttock once a week for 3 consecutive weeks  HIV  recall prn please

## 2019-10-22 NOTE — PROGRESS NOTE ADULT - SUBJECTIVE AND OBJECTIVE BOX
Patient was seen and examined. Spoke with RN. Chart reviewed.  No events overnight.  Vital Signs Last 24 Hrs  T(F): 96.9 (22 Oct 2019 05:33), Max: 97.4 (21 Oct 2019 20:53)  HR: 66 (22 Oct 2019 05:33) (46 - 66)  BP: 128/70 (22 Oct 2019 05:33) (128/70 - 167/72)  SpO2: 100% (21 Oct 2019 13:59) (100% - 100%)  MEDICATIONS  (STANDING):  amLODIPine   Tablet 5 milliGRAM(s) Oral daily  atorvastatin 20 milliGRAM(s) Oral at bedtime  calcium carbonate 1250 mG  + Vitamin D (OsCal 500 + D) 1 Tablet(s) Oral daily  dextrose 5% + sodium chloride 0.9%. 1000 milliLiter(s) (75 mL/Hr) IV Continuous <Continuous>  docusate sodium 100 milliGRAM(s) Oral two times a day  donepezil 5 milliGRAM(s) Oral at bedtime  enoxaparin Injectable 40 milliGRAM(s) SubCutaneous daily  hydrochlorothiazide 12.5 milliGRAM(s) Oral daily  multivitamin 1 Tablet(s) Oral daily  penicillin   G benzathine Injectable 1.2 Million Unit(s) IntraMuscular every 7 days  senna 1 Tablet(s) Oral daily  tamsulosin 0.4 milliGRAM(s) Oral at bedtime    MEDICATIONS  (PRN):    Labs:                        11.5   5.96  )-----------( 185      ( 21 Oct 2019 06:18 )             33.9     21 Oct 2019 06:18    141    |  107    |  15     ----------------------------<  106    4.0     |  22     |  0.8      Ca    8.1        21 Oct 2019 06:18  Mg     1.9       21 Oct 2019 06:18    TPro  5.9    /  Alb  3.2    /  TBili  0.3    /  DBili  x      /  AST  34     /  ALT  18     /  AlkPhos  42     21 Oct 2019 06:18          General: comfortable, NAD  Neurology: a&a, confused, nonfocal  Head:  Normocephalic, atraumatic  ENT:  Mucosa moist, no ulcerations  Neck:  Supple, no JVD,   Skin: no breakdowns (as per RN)  Resp: CTA B/L  CV: RRR, S1S2,   GI: Soft, NT, bowel sounds  MS: No edema, + peripheral pulses, FROM all 4 extremity      A/P:  tertiary syphilis   dementia  encephalopathy  metastatic prostate Ca     psych appreciated   meds as per psych   abx as per ID  HIV test?   Zytiga?  neuro f/u   hydration   fall precautions   maintain safety   continue current care  D/C planning 24 hrs    DVT prophylaxis  Decubitus prevention- all measures as per RN protocol  Please call or text me with any questions or updates Patient was seen and examined. Spoke with RN. Chart reviewed.  No events overnight.  Vital Signs Last 24 Hrs  T(F): 96.9 (22 Oct 2019 05:33), Max: 97.4 (21 Oct 2019 20:53)  HR: 66 (22 Oct 2019 05:33) (46 - 66)  BP: 128/70 (22 Oct 2019 05:33) (128/70 - 167/72)  SpO2: 100% (21 Oct 2019 13:59) (100% - 100%)  MEDICATIONS  (STANDING):  amLODIPine   Tablet 5 milliGRAM(s) Oral daily  atorvastatin 20 milliGRAM(s) Oral at bedtime  calcium carbonate 1250 mG  + Vitamin D (OsCal 500 + D) 1 Tablet(s) Oral daily  dextrose 5% + sodium chloride 0.9%. 1000 milliLiter(s) (75 mL/Hr) IV Continuous <Continuous>  docusate sodium 100 milliGRAM(s) Oral two times a day  donepezil 5 milliGRAM(s) Oral at bedtime  enoxaparin Injectable 40 milliGRAM(s) SubCutaneous daily  hydrochlorothiazide 12.5 milliGRAM(s) Oral daily  multivitamin 1 Tablet(s) Oral daily  penicillin   G benzathine Injectable 1.2 Million Unit(s) IntraMuscular every 7 days  senna 1 Tablet(s) Oral daily  tamsulosin 0.4 milliGRAM(s) Oral at bedtime    MEDICATIONS  (PRN):    Labs:                        11.5   5.96  )-----------( 185      ( 21 Oct 2019 06:18 )             33.9     21 Oct 2019 06:18    141    |  107    |  15     ----------------------------<  106    4.0     |  22     |  0.8      Ca    8.1        21 Oct 2019 06:18  Mg     1.9       21 Oct 2019 06:18    TPro  5.9    /  Alb  3.2    /  TBili  0.3    /  DBili  x      /  AST  34     /  ALT  18     /  AlkPhos  42     21 Oct 2019 06:18          General: comfortable, NAD  Neurology: a&a, confused, nonfocal  Head:  Normocephalic, atraumatic  ENT:  Mucosa moist, no ulcerations  Neck:  Supple, no JVD,   Skin: no breakdowns (as per RN)  Resp: CTA B/L  CV: RRR, S1S2,   GI: Soft, NT, bowel sounds  MS: No edema, + peripheral pulses, FROM all 4 extremity      A/P:  tertiary syphilis   dementia  encephalopathy  metastatic prostate Ca     psych appreciated   meds as per psych   abx as per ID  HIV test?   Zytiga?  hydration   fall precautions   maintain safety   continue current care  D/C planning 24 hrs    DVT prophylaxis  Decubitus prevention- all measures as per RN protocol  Please call or text me with any questions or updates

## 2019-10-23 LAB
BASE EXCESS BLDA CALC-SCNC: 2.8 MMOL/L — HIGH (ref -2–2)
GLUCOSE BLDC GLUCOMTR-MCNC: 118 MG/DL — HIGH (ref 70–99)
HCO3 BLDA-SCNC: 27 MMOL/L — SIGNIFICANT CHANGE UP (ref 23–27)
HOROWITZ INDEX BLDA+IHG-RTO: 21 — SIGNIFICANT CHANGE UP
PCO2 BLDA: 41 MMHG — SIGNIFICANT CHANGE UP (ref 38–42)
PH BLDA: 7.43 — HIGH (ref 7.38–7.42)
PO2 BLDA: 70 MMHG — LOW (ref 78–95)
SAO2 % BLDA: 94 % — SIGNIFICANT CHANGE UP (ref 94–98)
TROPONIN T SERPL-MCNC: <0.01 NG/ML — SIGNIFICANT CHANGE UP
TROPONIN T SERPL-MCNC: <0.01 NG/ML — SIGNIFICANT CHANGE UP
WNV RNA SPEC QL NAA+PROBE: SIGNIFICANT CHANGE UP
WNV RNA SPEC QL NAA+PROBE: SIGNIFICANT CHANGE UP

## 2019-10-23 PROCEDURE — 93010 ELECTROCARDIOGRAM REPORT: CPT

## 2019-10-23 PROCEDURE — 99233 SBSQ HOSP IP/OBS HIGH 50: CPT

## 2019-10-23 PROCEDURE — 71045 X-RAY EXAM CHEST 1 VIEW: CPT | Mod: 26

## 2019-10-23 PROCEDURE — 99222 1ST HOSP IP/OBS MODERATE 55: CPT

## 2019-10-23 PROCEDURE — 93010 ELECTROCARDIOGRAM REPORT: CPT | Mod: 77

## 2019-10-23 PROCEDURE — 70450 CT HEAD/BRAIN W/O DYE: CPT | Mod: 26

## 2019-10-23 RX ADMIN — Medication 12.5 MILLIGRAM(S): at 05:44

## 2019-10-23 RX ADMIN — SODIUM CHLORIDE 75 MILLILITER(S): 9 INJECTION, SOLUTION INTRAVENOUS at 17:11

## 2019-10-23 RX ADMIN — Medication 1 TABLET(S): at 11:10

## 2019-10-23 RX ADMIN — Medication 100 MILLIGRAM(S): at 17:12

## 2019-10-23 RX ADMIN — DONEPEZIL HYDROCHLORIDE 5 MILLIGRAM(S): 10 TABLET, FILM COATED ORAL at 22:21

## 2019-10-23 RX ADMIN — ATORVASTATIN CALCIUM 20 MILLIGRAM(S): 80 TABLET, FILM COATED ORAL at 22:20

## 2019-10-23 RX ADMIN — AMLODIPINE BESYLATE 5 MILLIGRAM(S): 2.5 TABLET ORAL at 05:44

## 2019-10-23 RX ADMIN — ENOXAPARIN SODIUM 40 MILLIGRAM(S): 100 INJECTION SUBCUTANEOUS at 11:10

## 2019-10-23 RX ADMIN — TAMSULOSIN HYDROCHLORIDE 0.4 MILLIGRAM(S): 0.4 CAPSULE ORAL at 22:20

## 2019-10-23 RX ADMIN — Medication 100 MILLIGRAM(S): at 05:44

## 2019-10-23 RX ADMIN — SENNA PLUS 1 TABLET(S): 8.6 TABLET ORAL at 11:10

## 2019-10-23 NOTE — CONSULT NOTE ADULT - ASSESSMENT
PCP: Dr. Vernon   No outpatient cardiologist    This is a 86 y/o male with PMH of Metastatic prostate cancer, hypertension, dyslipidemia, hypokalemia, dementia who presents with hallucinations, being treated for tertiary syphilis. Altered mental status this AM, HCT negative. Now with bradycardia 30-50s. Lyme PCR negative.     Impression:  Sinus bradycardia   Loss of consciousness    Plan:   - Cont tele  - Patient not on any AV harjinder agents, please avoid  - Repeat EKG today    Will discuss with EP attending PCP: Dr. Vernon   No outpatient cardiologist    This is a 88 y/o male with PMH of Metastatic prostate cancer, hypertension, dyslipidemia, hypokalemia, dementia who presents with hallucinations, being treated for tertiary syphilis. Altered mental status this AM, HCT negative. Now with bradycardia 30-50s. Lyme PCR negative.     Impression:  Sinus bradycardia with blocked PACs  Loss of consciousness    Plan:   - Cont tele  - Patient not on any AV harjinder agents, please avoid  - No intervention from EP standpoint  - Echo to r/o structural disease and assess EF  - OP workup for ELEN, noted to be vira while asleep  - Please recall as needed    Will discuss with EP attending

## 2019-10-23 NOTE — CONSULT NOTE ADULT - SUBJECTIVE AND OBJECTIVE BOX
Patient is a 87y old  Male who presents with a chief complaint of Hallucinations (23 Oct 2019 14:51)    HPI:  CC: Hallucinations     87 year old male   PMH: Metastatic prostate cancer, hypertension, dyslipidemia, hypokalemia, dementia  Past Surgical History: No known past surgical history     History of Present Illness goes back to 2019 when patient suddenly started having altered behaviour per the son. He was more angry and defiant than usual. The patient then started having hallucinations witnessed by the son as he was called by the patient saying there is a lady in his bed, described as a dressed skeleton however she would not talk. He would also see children playing in his living room silently without responding to him. He denies hearing voices when no one was present. He says he believes these people were trying to steal thing from his home and take his home which is worrying him the most to the point he called the police 3 times. These hallucinations occur mainly at nighttime.   Of note, patient's wife passed away in 2019 after long illness and per the son the patient has not been the same since.     In the ED, vitals were stable, labs were unremarkable, UA was negative and a CT scan showed cerebral and cerebellar atrophy. (14 Oct 2019 19:49)  Patient being treated for tertiary syphilis. Today patient found unresponsive, STROKE code called, HCT negative. Noted to be bradycardic on EKG to 50. EP consulted for further evaluation. Patient AO x3, takes long time to answer questions. Needs to be stimulated repeatedly. Speech confused. Poor historian. EKG from  with 1st degree AVB 56 BPM (252 IN interval)    REVIEW OF SYSTEMS  Due to altered mental status/intubation, subjective information were not able to be obtained from the patient. History was obtained, to the extent possible, from review of the chart and collateral sources of information.    PAST MEDICAL & SURGICAL HISTORY:  H/O hypokalemia  Dyslipidemia  Hypertension  Prostate cancer    Home Medications:  amLODIPine 5 mg oral tablet: 1 tab(s) orally once a day (14 Oct 2019 19:59)  atorvastatin 20 mg oral tablet: 1 tab(s) orally once a day (14 Oct 2019 19:59)  calcium (as citrate)-vitamin D 250 mg-500 intl units oral tablet, chewable: 2 tab(s) orally 2 times a day (with meals) (14 Oct 2019 20:00)  hydroCHLOROthiazide 12.5 mg oral capsule: 1 cap(s) orally once a day (14 Oct 2019 19:59)  tamsulosin 0.4 mg oral capsule: 1 cap(s) orally once a day (14 Oct 2019 19:59)    Allergies:  No Known Allergies    PREVIOUS DIAGNOSTIC TESTING:      ECHO  FINDINGS:    STRESS  FINDINGS:    CATHETERIZATION  FINDINGS:    ELECTROPHYSIOLOGY STUDY  FINDINGS:    CAROTID ULTRASOUND:  FINDINGS    VENOUS DUPLEX SCAN:  FINDINGS:    CHEST CT PULMONARY ANGIO with IV Contrast:  FINDINGS:    FAMILY HISTORY:  non-contributory    SOCIAL HISTORY:    CIGARETTES: denies    ALCOHOL: denies      MEDICATIONS  (STANDING):  amLODIPine   Tablet 5 milliGRAM(s) Oral daily  atorvastatin 20 milliGRAM(s) Oral at bedtime  calcium carbonate 1250 mG  + Vitamin D (OsCal 500 + D) 1 Tablet(s) Oral daily  dextrose 5% + sodium chloride 0.9%. 1000 milliLiter(s) (75 mL/Hr) IV Continuous <Continuous>  docusate sodium 100 milliGRAM(s) Oral two times a day  donepezil 5 milliGRAM(s) Oral at bedtime  enoxaparin Injectable 40 milliGRAM(s) SubCutaneous daily  hydrochlorothiazide 12.5 milliGRAM(s) Oral daily  multivitamin 1 Tablet(s) Oral daily  penicillin   G benzathine Injectable 1.2 Million Unit(s) IntraMuscular every 7 days  senna 1 Tablet(s) Oral daily  tamsulosin 0.4 milliGRAM(s) Oral at bedtime    MEDICATIONS  (PRN):  haloperidol    Injectable 0.5 milliGRAM(s) IntraMuscular daily PRN Agitation      Vital Signs Last 24 Hrs  T(C): 36.2 (23 Oct 2019 14:52), Max: 36.2 (23 Oct 2019 14:52)  T(F): 97.2 (23 Oct 2019 14:52), Max: 97.2 (23 Oct 2019 14:52)  HR: 59 (23 Oct 2019 14:52) (59 - 62)  BP: 144/66 (23 Oct 2019 14:52) (144/66 - 193/79)  BP(mean): --  RR: 18 (23 Oct 2019 14:52) (18 - 18)  SpO2: 98% (23 Oct 2019 08:24) (98% - 98%)    PHYSICAL EXAM:    GENERAL: In no apparent distress, well nourished, and hydrated.  HEART: Irregular rate and rhythm; No murmurs, rubs, or gallops.  PULMONARY: Clear to auscultation and perfusion.  No rales, wheezing, or rhonchi bilaterally.  ABDOMEN: Soft, Nontender, Nondistended; Bowel sounds present  EXTREMITIES:  2+ Peripheral Pulses, No clubbing, cyanosis, or edema  NEUROLOGICAL: Grossly nonfocal, AO x3, confused at times, ELMORE Upper>lower      INTERPRETATION OF TELEMETRY: SR 61 BPM    ECG:  < from: 12 Lead ECG (10.23.19 @ 09:55) >  Ventricular Rate 50 BPM    Atrial Rate 50 BPM    QRS Duration 88 ms    Q-T Interval 502 ms    QTC Calculation(Bezet) 457 ms    P Axis 80 degrees    R Axis 69 degrees    T Axis 68 degrees    Diagnosis Line Sinus bradycardia with occasional Prematureatrial complexes  Otherwise normal ECG    < end of copied text >    I&O's Detail    22 Oct 2019 07:  -  23 Oct 2019 07:00  --------------------------------------------------------  IN:  Total IN: 0 mL    OUT:    Incontinent per Condom Catheter: 1775 mL  Total OUT: 1775 mL    Total NET: -1775 mL      LABS:      CARDIAC MARKERS ( 23 Oct 2019 10:27 )  x     / <0.01 ng/mL / x     / x     / x          I&O's Detail    22 Oct 2019 07:  -  23 Oct 2019 07:00  --------------------------------------------------------  IN:  Total IN: 0 mL    OUT:    Incontinent per Condom Catheter: 1775 mL  Total OUT: 1775 mL    Total NET: -1775 mL    Daily Height in cm: 180.34 (22 Oct 2019 15:03)    Daily Weight in k (22 Oct 2019 15:03)    RADIOLOGY & ADDITIONAL STUDIES:  < from: Xray Chest 1 View-PORTABLE IMMEDIATE (10.23.19 @ 10:14) >  IMPRESSION:     No radiographic evidence of acute cardiopulmonary disease.    < end of copied text >    < from: CT Head No Cont (10.14.19 @ 16:06) >  IMPRESSION:    Cerebral and cerebellar atrophy.    < end of copied text >    < from: CT Brain Stroke Protocol (10.23.19 @ 10:15) >  IMPRESSION:     1.  No evidence of acute intracranial hemorrhage or large territory   infarct. Stable exam since 10/14/19.    2.  Moderate chronic microvascular changes.    < end of copied text > Patient is a 87y old  Male who presents with a chief complaint of Hallucinations (23 Oct 2019 14:51)    HPI:  CC: Hallucinations     PMH: Metastatic prostate cancer, hypertension, dyslipidemia, hypokalemia, dementia  Past Surgical History: No known past surgical history     86 yo M with history of Present Illness dating back to 2019 when patient suddenly started having altered behaviour per the son. He was more angry and defiant than usual. The patient then started having hallucinations witnessed by the son as he was called by the patient saying there is a lady in his bed, described as a dressed skeleton however she would not talk. He would also see children playing in his living room silently without responding to him. He denies hearing voices when no one was present. He says he believes these people were trying to steal thing from his home and take his home which is worrying him the most to the point he called the police 3 times. These hallucinations occur mainly at nighttime.   Of note, patient's wife passed away in 2019 after long illness and per the son the patient has not been the same since.     In the ED, vitals were stable, labs were unremarkable, UA was negative and a CT scan showed cerebral and cerebellar atrophy. (14 Oct 2019 19:49)  Patient being treated for tertiary syphilis. Today patient found unresponsive, STROKE code called, HCT negative. Noted to be bradycardic on EKG to 50. EP consulted for further evaluation. Patient AO x3, takes long time to answer questions. Needs to be stimulated repeatedly. Speech confused. Poor historian. EKG from  with 1st degree AVB 56 BPM (252 CO interval)    REVIEW OF SYSTEMS  Due to altered mental status/intubation, subjective information were not able to be obtained from the patient. History was obtained, to the extent possible, from review of the chart and collateral sources of information.    PAST MEDICAL & SURGICAL HISTORY:  H/O hypokalemia  Dyslipidemia  Hypertension  Prostate cancer    Home Medications:  amLODIPine 5 mg oral tablet: 1 tab(s) orally once a day (14 Oct 2019 19:59)  atorvastatin 20 mg oral tablet: 1 tab(s) orally once a day (14 Oct 2019 19:59)  calcium (as citrate)-vitamin D 250 mg-500 intl units oral tablet, chewable: 2 tab(s) orally 2 times a day (with meals) (14 Oct 2019 20:00)  hydroCHLOROthiazide 12.5 mg oral capsule: 1 cap(s) orally once a day (14 Oct 2019 19:59)  tamsulosin 0.4 mg oral capsule: 1 cap(s) orally once a day (14 Oct 2019 19:59)    Allergies:  No Known Allergies    FAMILY HISTORY:  non-contributory    SOCIAL HISTORY:  CIGARETTES: denies  ALCOHOL: denies      MEDICATIONS  (STANDING):  amLODIPine   Tablet 5 milliGRAM(s) Oral daily  atorvastatin 20 milliGRAM(s) Oral at bedtime  calcium carbonate 1250 mG  + Vitamin D (OsCal 500 + D) 1 Tablet(s) Oral daily  dextrose 5% + sodium chloride 0.9%. 1000 milliLiter(s) (75 mL/Hr) IV Continuous <Continuous>  docusate sodium 100 milliGRAM(s) Oral two times a day  donepezil 5 milliGRAM(s) Oral at bedtime  enoxaparin Injectable 40 milliGRAM(s) SubCutaneous daily  hydrochlorothiazide 12.5 milliGRAM(s) Oral daily  multivitamin 1 Tablet(s) Oral daily  penicillin   G benzathine Injectable 1.2 Million Unit(s) IntraMuscular every 7 days  senna 1 Tablet(s) Oral daily  tamsulosin 0.4 milliGRAM(s) Oral at bedtime    MEDICATIONS  (PRN):  haloperidol    Injectable 0.5 milliGRAM(s) IntraMuscular daily PRN Agitation      Vital Signs Last 24 Hrs  T(C): 36.2 (23 Oct 2019 14:52), Max: 36.2 (23 Oct 2019 14:52)  T(F): 97.2 (23 Oct 2019 14:52), Max: 97.2 (23 Oct 2019 14:52)  HR: 59 (23 Oct 2019 14:52) (59 - 62)  BP: 144/66 (23 Oct 2019 14:52) (144/66 - 193/79)  BP(mean): --  RR: 18 (23 Oct 2019 14:52) (18 - 18)  SpO2: 98% (23 Oct 2019 08:24) (98% - 98%)    PHYSICAL EXAM:  GENERAL: In no apparent distress, well nourished, and hydrated.  HEART: Irregular rate and rhythm; No murmurs, rubs, or gallops.  PULMONARY: Clear to auscultation and perfusion.  No rales, wheezing, or rhonchi bilaterally.  ABDOMEN: Soft, Nontender, Nondistended; Bowel sounds present  EXTREMITIES:  2+ Peripheral Pulses, No clubbing, cyanosis, or edema  NEUROLOGICAL: Grossly nonfocal, AO x3, confused at times, ELMORE Upper>lower      INTERPRETATION OF TELEMETRY: NSR 61 BPM, occ blocked PACs    ECG:  < from: 12 Lead ECG (10.23.19 @ 09:55) >  Ventricular Rate 50 BPM    Atrial Rate 50 BPM    QRS Duration 88 ms    Q-T Interval 502 ms    QTC Calculation(Bezet) 457 ms    P Axis 80 degrees    R Axis 69 degrees    T Axis 68 degrees    Diagnosis Line Sinus bradycardia with occasional Premature atrial complexes  Otherwise normal ECG    < end of copied text >    I&O's Detail    22 Oct 2019 07:  -  23 Oct 2019 07:00  --------------------------------------------------------  IN:  Total IN: 0 mL    OUT:    Incontinent per Condom Catheter: 1775 mL  Total OUT: 1775 mL    Total NET: -1775 mL      LABS:      CARDIAC MARKERS ( 23 Oct 2019 10:27 )  x     / <0.01 ng/mL / x     / x     / x          I&O's Detail    22 Oct 2019 07:  -  23 Oct 2019 07:00  --------------------------------------------------------  IN:  Total IN: 0 mL    OUT:    Incontinent per Condom Catheter: 1775 mL  Total OUT: 1775 mL    Total NET: -1775 mL    Daily Height in cm: 180.34 (22 Oct 2019 15:03)    Daily Weight in k (22 Oct 2019 15:03)    RADIOLOGY & ADDITIONAL STUDIES:  < from: Xray Chest 1 View-PORTABLE IMMEDIATE (10.23.19 @ 10:14) >  IMPRESSION:   No radiographic evidence of acute cardiopulmonary disease.  < end of copied text >    < from: CT Head No Cont (10.14.19 @ 16:06) >  IMPRESSION:  Cerebral and cerebellar atrophy.  < end of copied text >    < from: CT Brain Stroke Protocol (10.23.19 @ 10:15) >  IMPRESSION:   1.  No evidence of acute intracranial hemorrhage or large territory infarct. Stable exam since 10/14/19.  2.  Moderate chronic microvascular changes.  < end of copied text >

## 2019-10-23 NOTE — CONSULT NOTE ADULT - ASSESSMENT
88 yo M with PMH of metastatic prostate cancer, HTN, DLD presented to ED with altered mental status and visual hallucinations. Is being treated for tertiary syphillis. Had stroke code called 10/23 for episode of unresponsiveness.    Assessment & Plan:  Sinus bradycardia w/ 1st degree AV block and frequent PACs    ATTENDING NOTE TO FOLLOW 86 yo M with PMH of metastatic prostate cancer, HTN, DLD presented to ED with altered mental status and visual hallucinations. Is being treated for tertiary syphillis. Had stroke code called 10/23 for episode of unresponsiveness.    Assessment & Plan:  Sinus bradycardia w/ 1st degree AV block and PACs    - c/w tele monitoring for 48 hours  - check 2d echo  - avoid AV harjinder blocking agents    Discussed case with attending. 86 yo M with PMH of metastatic prostate cancer, HTN, DLD presented to ED with altered mental status and visual hallucinations. Is being treated for tertiary syphillis. Had stroke code called 10/23 for episode of unresponsiveness.    Assessment & Plan:  Sinus bradycardia w/ 1st degree AV block and PACs    - c/w tele monitoring for 48 hours  - check 2d echo  - avoid AV harjinder blocking agents

## 2019-10-23 NOTE — CONSULT NOTE ADULT - ASSESSMENT
87 year old male with pmh of  Metastatic prostate cancer, hypertension, dyslipidemia, hypokalemia  and dementia, admitted for hallucinations, was called stroke code for an episode of unresponsiveness. As per staff patient does have episodes of starring and unresponsiveness from time to time.   Patient was a candidate for IV TPA or neuroendovascular intervention since MRS is 4 and no focal deficits on exam. CTH is unremarkable.    Plan:  Admit to tele  Cardio work up for abnormal rhythm  Echo  MRI brain NC  Aspirin, Statin      Neuroattending note will follow 87 year old male with pmh of  Metastatic prostate cancer, hypertension, dyslipidemia, hypokalemia  and dementia, admitted for hallucinations, was called stroke code for an episode of unresponsiveness. As per staff patient does have episodes of starring and unresponsiveness from time to time.   Patient was a candidate for IV TPA or neuroendovascular intervention since MRS is 4 and no focal deficits on exam. CTH is unremarkable.    Plan:  Admit to tele  Cardio work up for abnormal rhythm  Echo  MRI brain NC  EEG  Aspirin, Statin      Neuroattending note will follow

## 2019-10-23 NOTE — CONSULT NOTE ADULT - SUBJECTIVE AND OBJECTIVE BOX
HPI:  CC: Hallucinations     87 year old male   PMH: Metastatic prostate cancer, hypertension, dyslipidemia, hypokalemia    Past Surgical History: No known past surgical history     History of Present Illness goes back to 9/2019 when patient suddenly started having altered behaviour per the son. He was more angry and defiant than usual. The patient then started having hallucinations witnessed by the son as he was called by the patient saying there is a lady in his bed, described as a dressed skeleton however she would not talk. He would also see children playing in his living room silently without responding to him. He denies hearing voices when no one was present. He says he believes these people were trying to steal thing from his home and take his home which is worrying him the most to the point he called the police 3 times. These hallucinations occur mainly at nighttime.   Of note, patient's wife passed away in 6/2019 after long illness and per the son the patient has not been the same since.     In the ED, vitals were stable, labs were unremarkable, UA was negative and a CT scan showed cerebral and cerebellar atrophy. (14 Oct 2019 19:49)    Cardiology is consulted for an abnormal EKG. Patient had stroke code called for episode of unresponsiveness. HR at time was noted to be 30-50's. EKG showed sinus bradycardia w/ PACs. Patient denies any complaints or symptoms. States he has never seen a Cardiologist before or had any Cardio workup. No echo in system. EKG from July also shows sinus bradycardia w/ 1st degree AV block (HR=50's).    PAST MEDICAL & SURGICAL HISTORY  H/O hypokalemia  Dyslipidemia  Hypertension  Prostate cancer    FAMILY HISTORY:  FAMILY HISTORY:    SOCIAL HISTORY:  []smoker  []Alcohol  []Drug    ALLERGIES:  No Known Allergies    MEDICATIONS:  MEDICATIONS  (STANDING):  amLODIPine   Tablet 5 milliGRAM(s) Oral daily  atorvastatin 20 milliGRAM(s) Oral at bedtime  calcium carbonate 1250 mG  + Vitamin D (OsCal 500 + D) 1 Tablet(s) Oral daily  dextrose 5% + sodium chloride 0.9%. 1000 milliLiter(s) (75 mL/Hr) IV Continuous <Continuous>  docusate sodium 100 milliGRAM(s) Oral two times a day  donepezil 5 milliGRAM(s) Oral at bedtime  enoxaparin Injectable 40 milliGRAM(s) SubCutaneous daily  hydrochlorothiazide 12.5 milliGRAM(s) Oral daily  multivitamin 1 Tablet(s) Oral daily  penicillin   G benzathine Injectable 1.2 Million Unit(s) IntraMuscular every 7 days  senna 1 Tablet(s) Oral daily  tamsulosin 0.4 milliGRAM(s) Oral at bedtime    MEDICATIONS  (PRN):  haloperidol    Injectable 0.5 milliGRAM(s) IntraMuscular daily PRN Agitation    HOME MEDICATIONS:  Home Medications:  amLODIPine 5 mg oral tablet: 1 tab(s) orally once a day (14 Oct 2019 19:59)  atorvastatin 20 mg oral tablet: 1 tab(s) orally once a day (14 Oct 2019 19:59)  calcium (as citrate)-vitamin D 250 mg-500 intl units oral tablet, chewable: 2 tab(s) orally 2 times a day (with meals) (14 Oct 2019 20:00)  hydroCHLOROthiazide 12.5 mg oral capsule: 1 cap(s) orally once a day (14 Oct 2019 19:59)  tamsulosin 0.4 mg oral capsule: 1 cap(s) orally once a day (14 Oct 2019 19:59)    VITALS:   T(F): 96.8 (10-23 @ 04:47), Max: 98.1 (10-20 @ 14:57)  HR: 61 (10-23 @ 04:47) (46 - 67)  BP: 153/69 (10-23 @ 04:47) (114/64 - 193/79)  BP(mean): --  RR: 18 (10-23 @ 04:47) (16 - 20)  SpO2: 98% (10-23 @ 08:24) (98% - 100%)    I&O's Summary    22 Oct 2019 07:01  -  23 Oct 2019 07:00  --------------------------------------------------------  IN: 0 mL / OUT: 1775 mL / NET: -1775 mL    REVIEW OF SYSTEMS:  CONSTITUTIONAL: No weakness, fevers or chills  EYES: No visual changes  ENT: No vertigo or throat pain   NECK: No pain or stiffness  RESPIRATORY: No cough, wheezing, hemoptysis; No shortness of breath  CARDIOVASCULAR: No chest pain or palpitations  GASTROINTESTINAL: No abdominal or epigastric pain. No nausea, vomiting, or hematemesis; No diarrhea or constipation. No melena or hematochezia.  GENITOURINARY: No dysuria, frequency or hematuria  NEUROLOGICAL: see HPI  SKIN: No itching, no rashes  MSK: no    PHYSICAL EXAM:  NEURO: patient is awake , alert and oriented  GEN: Not in acute distress  NECK: no thyroid enlargement, no JVD  LUNGS: Clear to auscultation bilaterally   CARDIOVASCULAR: S1/S2 present, RRR , no murmurs or rubs, + PP bilaterally  ABD: Soft, non-tender, non-distended, +BS  EXT: No HANSA  SKIN: Intact    LABS:  Troponin T, Serum: <0.01 ng/mL (10-23-19 @ 10:27)    CARDIAC MARKERS ( 23 Oct 2019 10:27 )  x     / <0.01 ng/mL / x     / x     / x        Troponin trend:    ECG: sinus rhythm, bradycardia, 1st degree AV block    TELEMETRY EVENTS: sinus bradycardia w/ frequent PACs HPI:  CC: Hallucinations     87 year old male   PMH: Metastatic prostate cancer, hypertension, dyslipidemia, hypokalemia    Past Surgical History: No known past surgical history     History of Present Illness goes back to 9/2019 when patient suddenly started having altered behaviour per the son. He was more angry and defiant than usual. The patient then started having hallucinations witnessed by the son as he was called by the patient saying there is a lady in his bed, described as a dressed skeleton however she would not talk. He would also see children playing in his living room silently without responding to him. He denies hearing voices when no one was present. He says he believes these people were trying to steal thing from his home and take his home which is worrying him the most to the point he called the police 3 times. These hallucinations occur mainly at nighttime.   Of note, patient's wife passed away in 6/2019 after long illness and per the son the patient has not been the same since.     In the ED, vitals were stable, labs were unremarkable, UA was negative and a CT scan showed cerebral and cerebellar atrophy. (14 Oct 2019 19:49)    Cardiology is consulted for an abnormal EKG. Patient had stroke code called for episode of unresponsiveness. HR at time was noted to be 30-50's. EKG showed sinus bradycardia w/ PACs. Patient denies any complaints or symptoms. States he has never seen a Cardiologist before or had any Cardio workup. No echo in system. EKG from July also shows sinus bradycardia w/ 1st degree AV block (HR=50's).    PAST MEDICAL & SURGICAL HISTORY  H/O hypokalemia  Dyslipidemia  Hypertension  Prostate cancer    FAMILY HISTORY:  FAMILY HISTORY:    SOCIAL HISTORY:  [X]smoker  [X]Alcohol  [X]Drug    ALLERGIES:  No Known Allergies    MEDICATIONS:  MEDICATIONS  (STANDING):  amLODIPine   Tablet 5 milliGRAM(s) Oral daily  atorvastatin 20 milliGRAM(s) Oral at bedtime  calcium carbonate 1250 mG  + Vitamin D (OsCal 500 + D) 1 Tablet(s) Oral daily  dextrose 5% + sodium chloride 0.9%. 1000 milliLiter(s) (75 mL/Hr) IV Continuous <Continuous>  docusate sodium 100 milliGRAM(s) Oral two times a day  donepezil 5 milliGRAM(s) Oral at bedtime  enoxaparin Injectable 40 milliGRAM(s) SubCutaneous daily  hydrochlorothiazide 12.5 milliGRAM(s) Oral daily  multivitamin 1 Tablet(s) Oral daily  penicillin   G benzathine Injectable 1.2 Million Unit(s) IntraMuscular every 7 days  senna 1 Tablet(s) Oral daily  tamsulosin 0.4 milliGRAM(s) Oral at bedtime    MEDICATIONS  (PRN):  haloperidol    Injectable 0.5 milliGRAM(s) IntraMuscular daily PRN Agitation    HOME MEDICATIONS:  Home Medications:  amLODIPine 5 mg oral tablet: 1 tab(s) orally once a day (14 Oct 2019 19:59)  atorvastatin 20 mg oral tablet: 1 tab(s) orally once a day (14 Oct 2019 19:59)  calcium (as citrate)-vitamin D 250 mg-500 intl units oral tablet, chewable: 2 tab(s) orally 2 times a day (with meals) (14 Oct 2019 20:00)  hydroCHLOROthiazide 12.5 mg oral capsule: 1 cap(s) orally once a day (14 Oct 2019 19:59)  tamsulosin 0.4 mg oral capsule: 1 cap(s) orally once a day (14 Oct 2019 19:59)    VITALS:   T(F): 96.8 (10-23 @ 04:47), Max: 98.1 (10-20 @ 14:57)  HR: 61 (10-23 @ 04:47) (46 - 67)  BP: 153/69 (10-23 @ 04:47) (114/64 - 193/79)  BP(mean): --  RR: 18 (10-23 @ 04:47) (16 - 20)  SpO2: 98% (10-23 @ 08:24) (98% - 100%)    I&O's Summary    22 Oct 2019 07:01  -  23 Oct 2019 07:00  --------------------------------------------------------  IN: 0 mL / OUT: 1775 mL / NET: -1775 mL    REVIEW OF SYSTEMS:  CONSTITUTIONAL: No weakness, fevers or chills  EYES: No visual changes  ENT: No vertigo or throat pain   NECK: No pain or stiffness  RESPIRATORY: No cough, wheezing, hemoptysis; No shortness of breath  CARDIOVASCULAR: No chest pain or palpitations  GASTROINTESTINAL: No abdominal or epigastric pain. No nausea, vomiting, or hematemesis; No diarrhea or constipation. No melena or hematochezia.  GENITOURINARY: No dysuria, frequency or hematuria  NEUROLOGICAL: see HPI  SKIN: No itching, no rashes  MSK: no    PHYSICAL EXAM:  NEURO: patient is awake , alert and oriented  GEN: Not in acute distress  NECK: no thyroid enlargement, no JVD  LUNGS: Clear to auscultation bilaterally   CARDIOVASCULAR: S1/S2 present, RRR , no murmurs or rubs, + PP bilaterally  ABD: Soft, non-tender, non-distended, +BS  EXT: No HANSA  SKIN: Intact    LABS:  Troponin T, Serum: <0.01 ng/mL (10-23-19 @ 10:27)    CARDIAC MARKERS ( 23 Oct 2019 10:27 )  x     / <0.01 ng/mL / x     / x     / x        Troponin trend:    ECG: sinus rhythm, bradycardia, 1st degree AV block    TELEMETRY EVENTS: sinus bradycardia w/ frequent PACs HPI:  CC: Hallucinations     87 year old male   PMH: Metastatic prostate cancer, hypertension, dyslipidemia, hypokalemia    Past Surgical History: No known past surgical history     History of Present Illness goes back to 9/2019 when patient suddenly started having altered behaviour per the son. He was more angry and defiant than usual. The patient then started having hallucinations witnessed by the son as he was called by the patient saying there is a lady in his bed, described as a dressed skeleton however she would not talk. He would also see children playing in his living room silently without responding to him. He denies hearing voices when no one was present. He says he believes these people were trying to steal thing from his home and take his home which is worrying him the most to the point he called the police 3 times. These hallucinations occur mainly at nighttime.   Of note, patient's wife passed away in 6/2019 after long illness and per the son the patient has not been the same since.     In the ED, vitals were stable, labs were unremarkable, UA was negative and a CT scan showed cerebral and cerebellar atrophy. (14 Oct 2019 19:49)    Cardiology is consulted for an abnormal EKG. Patient had stroke code called for episode of unresponsiveness. HR at time was noted to be 30-50's. EKG showed sinus bradycardia w/ PACs. Patient denies any complaints or symptoms. States he has never seen a Cardiologist before or had any Cardio workup. No echo in system. EKG from July also shows sinus bradycardia w/ 1st degree AV block (HR=50's).    PAST MEDICAL & SURGICAL HISTORY  H/O hypokalemia  Dyslipidemia  Hypertension  Prostate cancer    FAMILY HISTORY:  FAMILY HISTORY:    SOCIAL HISTORY:  [X]smoker  [X]Alcohol  [X]Drug    ALLERGIES:  No Known Allergies    MEDICATIONS:  MEDICATIONS  (STANDING):  amLODIPine   Tablet 5 milliGRAM(s) Oral daily  atorvastatin 20 milliGRAM(s) Oral at bedtime  calcium carbonate 1250 mG  + Vitamin D (OsCal 500 + D) 1 Tablet(s) Oral daily  dextrose 5% + sodium chloride 0.9%. 1000 milliLiter(s) (75 mL/Hr) IV Continuous <Continuous>  docusate sodium 100 milliGRAM(s) Oral two times a day  donepezil 5 milliGRAM(s) Oral at bedtime  enoxaparin Injectable 40 milliGRAM(s) SubCutaneous daily  hydrochlorothiazide 12.5 milliGRAM(s) Oral daily  multivitamin 1 Tablet(s) Oral daily  penicillin   G benzathine Injectable 1.2 Million Unit(s) IntraMuscular every 7 days  senna 1 Tablet(s) Oral daily  tamsulosin 0.4 milliGRAM(s) Oral at bedtime    MEDICATIONS  (PRN):  haloperidol    Injectable 0.5 milliGRAM(s) IntraMuscular daily PRN Agitation    HOME MEDICATIONS:  Home Medications:  amLODIPine 5 mg oral tablet: 1 tab(s) orally once a day (14 Oct 2019 19:59)  atorvastatin 20 mg oral tablet: 1 tab(s) orally once a day (14 Oct 2019 19:59)  calcium (as citrate)-vitamin D 250 mg-500 intl units oral tablet, chewable: 2 tab(s) orally 2 times a day (with meals) (14 Oct 2019 20:00)  hydroCHLOROthiazide 12.5 mg oral capsule: 1 cap(s) orally once a day (14 Oct 2019 19:59)  tamsulosin 0.4 mg oral capsule: 1 cap(s) orally once a day (14 Oct 2019 19:59)    VITALS:   T(F): 96.8 (10-23 @ 04:47), Max: 98.1 (10-20 @ 14:57)  HR: 61 (10-23 @ 04:47) (46 - 67)  BP: 153/69 (10-23 @ 04:47) (114/64 - 193/79)  BP(mean): --  RR: 18 (10-23 @ 04:47) (16 - 20)  SpO2: 98% (10-23 @ 08:24) (98% - 100%)    I&O's Summary    22 Oct 2019 07:01  -  23 Oct 2019 07:00  --------------------------------------------------------  IN: 0 mL / OUT: 1775 mL / NET: -1775 mL    REVIEW OF SYSTEMS:  CONSTITUTIONAL: + weakness, no fevers or chills  EYES: No visual changes  ENT: No vertigo or throat pain   NECK: No pain or stiffness  RESPIRATORY: No cough, wheezing, hemoptysis; No shortness of breath  CARDIOVASCULAR: No chest pain or palpitations  GASTROINTESTINAL: No abdominal or epigastric pain. No nausea, vomiting, or hematemesis; No diarrhea or constipation. No melena or hematochezia.  GENITOURINARY: No dysuria, frequency or hematuria  NEUROLOGICAL: see HPI  SKIN: No itching, no rashes  MSK: no    PHYSICAL EXAM:  NEURO: patient is awake , alert and oriented  GEN: Not in acute distress  HEENT: NC/AT  NECK: no thyroid enlargement, no JVD  LUNGS: Clear to auscultation bilaterally   CARDIOVASCULAR: S1/S2 present, RRR , no murmurs or rubs, + PP bilaterally  ABD: Soft, non-tender, non-distended, +BS  EXT/MSK: No HANSA  SKIN: Intact    LABS:  Troponin T, Serum: <0.01 ng/mL (10-23-19 @ 10:27)    CARDIAC MARKERS ( 23 Oct 2019 10:27 )  x     / <0.01 ng/mL / x     / x     / x        Troponin trend:    ECG: sinus rhythm, bradycardia, 1st degree AV block    TELEMETRY EVENTS: sinus bradycardia w/ frequent PACs

## 2019-10-23 NOTE — PROGRESS NOTE ADULT - ASSESSMENT
metabolic enceph  r/o cva  1 degree av block    tele  2dechoi  carotid duplex  mri brain  neuro  asa statin  cardio eval

## 2019-10-23 NOTE — CONSULT NOTE ADULT - SUBJECTIVE AND OBJECTIVE BOX
OKSANA BRANHAM    Chief Complaint:    Handed    HPI:  CC: Hallucinations     87 year old male   PMH: Metastatic prostate cancer, hypertension, dyslipidemia, hypokalemia    Past Surgical History: No known past surgical history     History of Present Illness goes back to 9/2019 when patient suddenly started having altered behaviour per the son. He was more angry and defiant than usual. The patient then started having hallucinations witnessed by the son as he was called by the patient saying there is a lady in his bed, described as a dressed skeleton however she would not talk. He would also see children playing in his living room silently without responding to him. He denies hearing voices when no one was present. He says he believes these people were trying to steal thing from his home and take his home which is worrying him the most to the point he called the police 3 times. These hallucinations occur mainly at nighttime.   Of note, patient's wife passed away in 6/2019 after long illness and per the son the patient has not been the same since.     In the ED, vitals were stable, labs were unremarkable, UA was negative and a CT scan showed cerebral and cerebellar atrophy. (14 Oct 2019 19:49)    Code stroke was called because patient was less responsive and did not follow commands. Last assessment by nurse was done at 6.30 am and patient was AAOx3.  On my exam patient is lethargic, barely opens his eyes, but answers questions and follows commands.  His BP is stable, BUT ECG shows irregular vira rhythm.  CTH is unremarkable. Exam is non focal.  Initial NIHSS is 17 then dropped to 7.      Relevant PMH:  [] Prior ischemic stroke/TIA  [] Afib  []CAD  []HTN  []DLD  []DM []PVD []Obesity [] Sedintary lifestyle []CHF  []ELEN  []Cancer Hx     Social History: [] Smoking []  Drug Use: []   Alcohol Use:   [] Other:      Possible Location of Stroke:    Possible Cause of Stroke:    Relevant Cerebral Imaging:    Relevant Cervicocerebral Imaging:          Relevant blood tests:      Relevant cardiac rhythm monitoring:    Relevant Cardiac Structure:(TTE/CINTHIA +/-):[]No intracardiac thrombus/[] no vegetation/[]no [place]/EF:      Home Medications:  amLODIPine 5 mg oral tablet: 1 tab(s) orally once a day (14 Oct 2019 19:59)  atorvastatin 20 mg oral tablet: 1 tab(s) orally once a day (14 Oct 2019 19:59)  calcium (as citrate)-vitamin D 250 mg-500 intl units oral tablet, chewable: 2 tab(s) orally 2 times a day (with meals) (14 Oct 2019 20:00)  hydroCHLOROthiazide 12.5 mg oral capsule: 1 cap(s) orally once a day (14 Oct 2019 19:59)  tamsulosin 0.4 mg oral capsule: 1 cap(s) orally once a day (14 Oct 2019 19:59)      MEDICATIONS  (STANDING):  amLODIPine   Tablet 5 milliGRAM(s) Oral daily  atorvastatin 20 milliGRAM(s) Oral at bedtime  calcium carbonate 1250 mG  + Vitamin D (OsCal 500 + D) 1 Tablet(s) Oral daily  dextrose 5% + sodium chloride 0.9%. 1000 milliLiter(s) (75 mL/Hr) IV Continuous <Continuous>  docusate sodium 100 milliGRAM(s) Oral two times a day  donepezil 5 milliGRAM(s) Oral at bedtime  enoxaparin Injectable 40 milliGRAM(s) SubCutaneous daily  hydrochlorothiazide 12.5 milliGRAM(s) Oral daily  multivitamin 1 Tablet(s) Oral daily  penicillin   G benzathine Injectable 1.2 Million Unit(s) IntraMuscular every 7 days  senna 1 Tablet(s) Oral daily  tamsulosin 0.4 milliGRAM(s) Oral at bedtime      PT/OT/Speech/Rehab/S&Swr:    Exam:    Vital Signs Last 24 Hrs  T(C): 36 (23 Oct 2019 04:47), Max: 36.1 (22 Oct 2019 20:48)  T(F): 96.8 (23 Oct 2019 04:47), Max: 96.9 (22 Oct 2019 20:48)  HR: 61 (23 Oct 2019 04:47) (61 - 62)  BP: 153/69 (23 Oct 2019 04:47) (153/69 - 193/79)  BP(mean): --  RR: 18 (23 Oct 2019 04:47) (18 - 18)  SpO2: 98% (23 Oct 2019 08:24) (98% - 98%)    NIHSS      LOC:       1a: 0    1b(Questions):  1         1c(Instructions): 0            Best Gaze:1  Visual:0  Motor:                 RUE: 0    RLE:2     LUE:  0   LLE: 2     FACE:0     Limb Ataxia:0  Sensory:    0   Language:  0     Dysarthria:    1      Extinction and Inattention:0    NIHSS on admission:          NIHSS yesterday:          NIHSS today: 7            m-RS:4    Impression:      Suggestion:  Routine stroke workup including:    Disposition:

## 2019-10-23 NOTE — PROGRESS NOTE ADULT - SUBJECTIVE AND OBJECTIVE BOX
s/p rapid response/stroke code  transferred to tele  Patient was seen and examined. Spoke with RN. Chart reviewed.    No events overnight.  Vital Signs Last 24 Hrs  T(F): 97.2 (23 Oct 2019 14:52), Max: 97.2 (23 Oct 2019 14:52)  HR: 59 (23 Oct 2019 14:52) (59 - 62)  BP: 144/66 (23 Oct 2019 14:52) (144/66 - 193/79)  SpO2: 98% (23 Oct 2019 08:24) (98% - 98%)  MEDICATIONS  (STANDING):  amLODIPine   Tablet 5 milliGRAM(s) Oral daily  atorvastatin 20 milliGRAM(s) Oral at bedtime  calcium carbonate 1250 mG  + Vitamin D (OsCal 500 + D) 1 Tablet(s) Oral daily  dextrose 5% + sodium chloride 0.9%. 1000 milliLiter(s) (75 mL/Hr) IV Continuous <Continuous>  docusate sodium 100 milliGRAM(s) Oral two times a day  donepezil 5 milliGRAM(s) Oral at bedtime  enoxaparin Injectable 40 milliGRAM(s) SubCutaneous daily  hydrochlorothiazide 12.5 milliGRAM(s) Oral daily  multivitamin 1 Tablet(s) Oral daily  penicillin   G benzathine Injectable 1.2 Million Unit(s) IntraMuscular every 7 days  senna 1 Tablet(s) Oral daily  tamsulosin 0.4 milliGRAM(s) Oral at bedtime    MEDICATIONS  (PRN):  haloperidol    Injectable 0.5 milliGRAM(s) IntraMuscular daily PRN Agitation    Labs:                  Radiology:    General: comfortable, NAD  Neurology: A&Ox1, nonfocal  Head:  Normocephalic, atraumatic  ENT:  Mucosa moist, no ulcerations  Neck:  Supple, no JVD,   Skin: no breakdowns (as per RN)  Resp: CTA B/L  CV: RRR, S1S2,   GI: Soft, NT, bowel sounds  MS: No edema, + peripheral pulses, FROM all 4 extremity

## 2019-10-24 LAB
HIV 1+2 AB+HIV1 P24 AG SERPL QL IA: SIGNIFICANT CHANGE UP
TROPONIN T SERPL-MCNC: <0.01 NG/ML — SIGNIFICANT CHANGE UP

## 2019-10-24 PROCEDURE — 93880 EXTRACRANIAL BILAT STUDY: CPT | Mod: 26

## 2019-10-24 PROCEDURE — 99222 1ST HOSP IP/OBS MODERATE 55: CPT

## 2019-10-24 PROCEDURE — 99232 SBSQ HOSP IP/OBS MODERATE 35: CPT

## 2019-10-24 RX ORDER — ASPIRIN/CALCIUM CARB/MAGNESIUM 324 MG
81 TABLET ORAL DAILY
Refills: 0 | Status: DISCONTINUED | OUTPATIENT
Start: 2019-10-24 | End: 2019-10-29

## 2019-10-24 RX ADMIN — SENNA PLUS 1 TABLET(S): 8.6 TABLET ORAL at 12:29

## 2019-10-24 RX ADMIN — Medication 100 MILLIGRAM(S): at 18:16

## 2019-10-24 RX ADMIN — ENOXAPARIN SODIUM 40 MILLIGRAM(S): 100 INJECTION SUBCUTANEOUS at 12:29

## 2019-10-24 RX ADMIN — DONEPEZIL HYDROCHLORIDE 5 MILLIGRAM(S): 10 TABLET, FILM COATED ORAL at 23:46

## 2019-10-24 RX ADMIN — ATORVASTATIN CALCIUM 20 MILLIGRAM(S): 80 TABLET, FILM COATED ORAL at 23:46

## 2019-10-24 RX ADMIN — Medication 1 TABLET(S): at 12:29

## 2019-10-24 RX ADMIN — Medication 81 MILLIGRAM(S): at 12:31

## 2019-10-24 RX ADMIN — SODIUM CHLORIDE 75 MILLILITER(S): 9 INJECTION, SOLUTION INTRAVENOUS at 18:16

## 2019-10-24 RX ADMIN — TAMSULOSIN HYDROCHLORIDE 0.4 MILLIGRAM(S): 0.4 CAPSULE ORAL at 23:46

## 2019-10-24 NOTE — PROGRESS NOTE ADULT - SUBJECTIVE AND OBJECTIVE BOX
Patient was seen and examined. Spoke with RN. Chart reviewed.    No events overnight.  Vital Signs Last 24 Hrs  T(F): 96.9 (24 Oct 2019 14:07), Max: 97.2 (23 Oct 2019 14:52)  HR: 61 (24 Oct 2019 14:07) (59 - 61)  BP: 97/51 (24 Oct 2019 14:07) (97/51 - 151/67)  SpO2: --  MEDICATIONS  (STANDING):  amLODIPine   Tablet 5 milliGRAM(s) Oral daily  aspirin enteric coated 81 milliGRAM(s) Oral daily  atorvastatin 20 milliGRAM(s) Oral at bedtime  calcium carbonate 1250 mG  + Vitamin D (OsCal 500 + D) 1 Tablet(s) Oral daily  dextrose 5% + sodium chloride 0.9%. 1000 milliLiter(s) (75 mL/Hr) IV Continuous <Continuous>  docusate sodium 100 milliGRAM(s) Oral two times a day  donepezil 5 milliGRAM(s) Oral at bedtime  enoxaparin Injectable 40 milliGRAM(s) SubCutaneous daily  hydrochlorothiazide 12.5 milliGRAM(s) Oral daily  multivitamin 1 Tablet(s) Oral daily  penicillin   G benzathine Injectable 1.2 Million Unit(s) IntraMuscular every 7 days  senna 1 Tablet(s) Oral daily  tamsulosin 0.4 milliGRAM(s) Oral at bedtime    MEDICATIONS  (PRN):  haloperidol    Injectable 0.5 milliGRAM(s) IntraMuscular daily PRN Agitation    Labs:                  Radiology:    General: comfortable, NAD  Neurology: A&Ox1 rigidity,  Head:  Normocephalic, atraumatic  ENT:  Mucosa moist, no ulcerations  Neck:  Supple, no JVD,   Skin: no breakdowns (as per RN)  Resp: CTA B/L  CV: RRR, S1S2,   GI: Soft, NT, bowel sounds  MS: No edema, + peripheral pulses, FROM all 4 extremity

## 2019-10-24 NOTE — PROGRESS NOTE ADULT - ASSESSMENT
A 87y old Male who presents with a chief complaint of Currently admitted to medicine with the primary diagnosis of Hallucinations    #Visual Hallucinations 2/2 Delirium likely due to Dehydration vs. Neurosyphilis   - CTH revealed cerebellar and cerebral atrophy-->Not significant in a patient of this age   - B12, Folate, TSH wnl.   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  - C/w Pen G intragluteal, 1 dose per week for 3 weeks  - CSF: Negative for cell count, VRDL negative  - Cryptococcus and acid fast negative  - lyme pcr -  - Haldol IM 0.5 PRN for agitation  - f/u MRI   -f/u carotid duplex    Bradycardia with 1st degree AV block and PAC  - f/u echo  - cont tele 24 hours  -outpatient work up ELEN    # Hyperlipidemia  c/w statin     # Hypertension  - remains labile in the 120s-170s  - c/w amlodipine and HCTZ    # Metastatic prostate cancer -  Patient was on Zytiga, d/c for unknown reason  c/w tamsulosin     GI PPX: Not indicated    DVT PPX: Lovenox   Activity: ambulate as tolerated   Dispo: Short term rehab placement

## 2019-10-24 NOTE — PROGRESS NOTE ADULT - ASSESSMENT
metabolic enceph  r/o cva  1 degree av block    tele  2dechoi  carotid duplex  eeg  mri brain  neuro  asa statin  cardio eval

## 2019-10-24 NOTE — PROGRESS NOTE ADULT - SUBJECTIVE AND OBJECTIVE BOX
SUBJECTIVE:    Patient is a 87y old Male who presents with a chief complaint of Hallucinations (24 Oct 2019 14:19)    Overnight Events:  Patient was seen at bed side this morning . minimally responsive A&Ox1.    PAST MEDICAL & SURGICAL HISTORY  H/O hypokalemia  Dyslipidemia  Hypertension  Prostate cancer    SOCIAL HISTORY:  Negative for smoking/alcohol/drug use.     ALLERGIES:  No Known Allergies    MEDICATIONS:  STANDING MEDICATIONS  amLODIPine   Tablet 5 milliGRAM(s) Oral daily  aspirin enteric coated 81 milliGRAM(s) Oral daily  atorvastatin 20 milliGRAM(s) Oral at bedtime  calcium carbonate 1250 mG  + Vitamin D (OsCal 500 + D) 1 Tablet(s) Oral daily  dextrose 5% + sodium chloride 0.9%. 1000 milliLiter(s) IV Continuous <Continuous>  docusate sodium 100 milliGRAM(s) Oral two times a day  donepezil 5 milliGRAM(s) Oral at bedtime  enoxaparin Injectable 40 milliGRAM(s) SubCutaneous daily  hydrochlorothiazide 12.5 milliGRAM(s) Oral daily  multivitamin 1 Tablet(s) Oral daily  penicillin   G benzathine Injectable 1.2 Million Unit(s) IntraMuscular every 7 days  senna 1 Tablet(s) Oral daily  tamsulosin 0.4 milliGRAM(s) Oral at bedtime    PRN MEDICATIONS  haloperidol    Injectable 0.5 milliGRAM(s) IntraMuscular daily PRN    VITALS:   T(F): 96.8  HR: 55  BP: 138/70  RR: 18  SpO2: --    10-24-19 @ 07:01  -  10-24-19 @ 20:54  --------------------------------------------------------  IN: 240 mL / OUT: 0 mL / NET: 240 mL      PHYSICAL EXAM:  GENERAL: NAD, appears stated age, frail  HEENT: NCAT  CHEST/LUNG: CTAB  HEART: Regular rate and rhythm; s1 s2 appreciated, No murmurs, rubs, or gallops  ABDOMEN: Soft, Nontender, Nondistended; Bowel sounds present  EXTREMITIES: No LE edema b/l  NERVOUS SYSTEM:  Alert & Oriented X1 (name)    LABS:              ABG - ( 23 Oct 2019 10:04 )  pH, Arterial: 7.43  pH, Blood: x     /  pCO2: 41    /  pO2: 70    / HCO3: 27    / Base Excess: 2.8   /  SaO2: 94                Troponin T, Serum: <0.01 ng/mL (10-24-19 @ 07:40)      CARDIAC MARKERS ( 24 Oct 2019 07:40 )  x     / <0.01 ng/mL / x     / x     / x      CARDIAC MARKERS ( 23 Oct 2019 18:24 )  x     / <0.01 ng/mL / x     / x     / x      CARDIAC MARKERS ( 23 Oct 2019 10:27 )  x     / <0.01 ng/mL / x     / x     / x              10-24-19 @ 07:01  -  10-24-19 @ 20:54  --------------------------------------------------------  IN: 240 mL / OUT: 0 mL / NET: 240 mL          Radiology:

## 2019-10-24 NOTE — PROGRESS NOTE ADULT - SUBJECTIVE AND OBJECTIVE BOX
Patient is a 87y old  Male who presents with a chief complaint of Hallucinations (23 Oct 2019 16:15)    HPI:  CC: Hallucinations     87 year old male   PMH: Metastatic prostate cancer, hypertension, dyslipidemia, hypokalemia    Past Surgical History: No known past surgical history     History of Present Illness goes back to 9/2019 when patient suddenly started having altered behaviour per the son. He was more angry and defiant than usual. The patient then started having hallucinations witnessed by the son as he was called by the patient saying there is a lady in his bed, described as a dressed skeleton however she would not talk. He would also see children playing in his living room silently without responding to him. He denies hearing voices when no one was present. He says he believes these people were trying to steal thing from his home and take his home which is worrying him the most to the point he called the police 3 times. These hallucinations occur mainly at nighttime.   Of note, patient's wife passed away in 6/2019 after long illness and per the son the patient has not been the same since.     In the ED, vitals were stable, labs were unremarkable, UA was negative and a CT scan showed cerebral and cerebellar atrophy. (14 Oct 2019 19:49)      SUBJ:  Patient seen and examined.      MEDICATIONS  (STANDING):  amLODIPine   Tablet 5 milliGRAM(s) Oral daily  aspirin enteric coated 81 milliGRAM(s) Oral daily  atorvastatin 20 milliGRAM(s) Oral at bedtime  calcium carbonate 1250 mG  + Vitamin D (OsCal 500 + D) 1 Tablet(s) Oral daily  dextrose 5% + sodium chloride 0.9%. 1000 milliLiter(s) (75 mL/Hr) IV Continuous <Continuous>  docusate sodium 100 milliGRAM(s) Oral two times a day  donepezil 5 milliGRAM(s) Oral at bedtime  enoxaparin Injectable 40 milliGRAM(s) SubCutaneous daily  hydrochlorothiazide 12.5 milliGRAM(s) Oral daily  multivitamin 1 Tablet(s) Oral daily  penicillin   G benzathine Injectable 1.2 Million Unit(s) IntraMuscular every 7 days  senna 1 Tablet(s) Oral daily  tamsulosin 0.4 milliGRAM(s) Oral at bedtime    MEDICATIONS  (PRN):  haloperidol    Injectable 0.5 milliGRAM(s) IntraMuscular daily PRN Agitation            Vital Signs Last 24 Hrs  T(C): 36.1 (24 Oct 2019 14:07), Max: 36.2 (23 Oct 2019 14:52)  T(F): 96.9 (24 Oct 2019 14:07), Max: 97.2 (23 Oct 2019 14:52)  HR: 61 (24 Oct 2019 14:07) (59 - 61)  BP: 97/51 (24 Oct 2019 14:07) (97/51 - 151/67)  BP(mean): --  RR: 18 (24 Oct 2019 14:07) (18 - 18)  SpO2: --      PHYSICAL EXAM:    GEN: NAD, confused  HEENT: NC/AT  Neck: No JVD  CV: Reg, S1-S2  Lungs: CTAB  Abd: Soft, non-tender  Ext: No edema      LABS:          CARDIAC MARKERS ( 24 Oct 2019 07:40 )  x     / <0.01 ng/mL / x     / x     / x      CARDIAC MARKERS ( 23 Oct 2019 18:24 )  x     / <0.01 ng/mL / x     / x     / x      CARDIAC MARKERS ( 23 Oct 2019 10:27 )  x     / <0.01 ng/mL / x     / x     / x                BNP  RADIOLOGY & ADDITIONAL STUDIES:      IMPRESSION AND PLAN:

## 2019-10-25 LAB
ALBUMIN SERPL ELPH-MCNC: 3.2 G/DL — LOW (ref 3.5–5.2)
ALP SERPL-CCNC: 46 U/L — SIGNIFICANT CHANGE UP (ref 30–115)
ALT FLD-CCNC: 18 U/L — SIGNIFICANT CHANGE UP (ref 0–41)
ANION GAP SERPL CALC-SCNC: 12 MMOL/L — SIGNIFICANT CHANGE UP (ref 7–14)
AST SERPL-CCNC: 30 U/L — SIGNIFICANT CHANGE UP (ref 0–41)
BILIRUB SERPL-MCNC: 0.3 MG/DL — SIGNIFICANT CHANGE UP (ref 0.2–1.2)
BUN SERPL-MCNC: 8 MG/DL — LOW (ref 10–20)
CALCIUM SERPL-MCNC: 8.2 MG/DL — LOW (ref 8.5–10.1)
CHLORIDE SERPL-SCNC: 110 MMOL/L — SIGNIFICANT CHANGE UP (ref 98–110)
CO2 SERPL-SCNC: 22 MMOL/L — SIGNIFICANT CHANGE UP (ref 17–32)
CREAT SERPL-MCNC: 0.9 MG/DL — SIGNIFICANT CHANGE UP (ref 0.7–1.5)
GLUCOSE SERPL-MCNC: 101 MG/DL — HIGH (ref 70–99)
HCT VFR BLD CALC: 34.2 % — LOW (ref 42–52)
HGB BLD-MCNC: 11.5 G/DL — LOW (ref 14–18)
MCHC RBC-ENTMCNC: 29.9 PG — SIGNIFICANT CHANGE UP (ref 27–31)
MCHC RBC-ENTMCNC: 33.6 G/DL — SIGNIFICANT CHANGE UP (ref 32–37)
MCV RBC AUTO: 88.8 FL — SIGNIFICANT CHANGE UP (ref 80–94)
NRBC # BLD: 0 /100 WBCS — SIGNIFICANT CHANGE UP (ref 0–0)
PLATELET # BLD AUTO: 249 K/UL — SIGNIFICANT CHANGE UP (ref 130–400)
POTASSIUM SERPL-MCNC: 3.3 MMOL/L — LOW (ref 3.5–5)
POTASSIUM SERPL-SCNC: 3.3 MMOL/L — LOW (ref 3.5–5)
PROT SERPL-MCNC: 5.8 G/DL — LOW (ref 6–8)
RBC # BLD: 3.85 M/UL — LOW (ref 4.7–6.1)
RBC # FLD: 13.9 % — SIGNIFICANT CHANGE UP (ref 11.5–14.5)
SODIUM SERPL-SCNC: 144 MMOL/L — SIGNIFICANT CHANGE UP (ref 135–146)
WBC # BLD: 6.13 K/UL — SIGNIFICANT CHANGE UP (ref 4.8–10.8)
WBC # FLD AUTO: 6.13 K/UL — SIGNIFICANT CHANGE UP (ref 4.8–10.8)

## 2019-10-25 PROCEDURE — 70551 MRI BRAIN STEM W/O DYE: CPT | Mod: 26

## 2019-10-25 PROCEDURE — 93306 TTE W/DOPPLER COMPLETE: CPT | Mod: 26

## 2019-10-25 RX ORDER — POTASSIUM CHLORIDE 20 MEQ
40 PACKET (EA) ORAL ONCE
Refills: 0 | Status: DISCONTINUED | OUTPATIENT
Start: 2019-10-25 | End: 2019-10-25

## 2019-10-25 RX ORDER — POTASSIUM CHLORIDE 20 MEQ
40 PACKET (EA) ORAL ONCE
Refills: 0 | Status: COMPLETED | OUTPATIENT
Start: 2019-10-25 | End: 2019-10-25

## 2019-10-25 RX ADMIN — SENNA PLUS 1 TABLET(S): 8.6 TABLET ORAL at 11:42

## 2019-10-25 RX ADMIN — Medication 40 MILLIEQUIVALENT(S): at 11:41

## 2019-10-25 RX ADMIN — AMLODIPINE BESYLATE 5 MILLIGRAM(S): 2.5 TABLET ORAL at 06:37

## 2019-10-25 RX ADMIN — Medication 100 MILLIGRAM(S): at 17:26

## 2019-10-25 RX ADMIN — Medication 1 TABLET(S): at 11:42

## 2019-10-25 RX ADMIN — SODIUM CHLORIDE 75 MILLILITER(S): 9 INJECTION, SOLUTION INTRAVENOUS at 21:27

## 2019-10-25 RX ADMIN — SODIUM CHLORIDE 75 MILLILITER(S): 9 INJECTION, SOLUTION INTRAVENOUS at 07:54

## 2019-10-25 RX ADMIN — TAMSULOSIN HYDROCHLORIDE 0.4 MILLIGRAM(S): 0.4 CAPSULE ORAL at 21:26

## 2019-10-25 RX ADMIN — ENOXAPARIN SODIUM 40 MILLIGRAM(S): 100 INJECTION SUBCUTANEOUS at 11:42

## 2019-10-25 RX ADMIN — Medication 81 MILLIGRAM(S): at 11:42

## 2019-10-25 RX ADMIN — DONEPEZIL HYDROCHLORIDE 5 MILLIGRAM(S): 10 TABLET, FILM COATED ORAL at 21:26

## 2019-10-25 RX ADMIN — Medication 100 MILLIGRAM(S): at 06:37

## 2019-10-25 RX ADMIN — ATORVASTATIN CALCIUM 20 MILLIGRAM(S): 80 TABLET, FILM COATED ORAL at 21:26

## 2019-10-25 RX ADMIN — Medication 12.5 MILLIGRAM(S): at 06:37

## 2019-10-25 NOTE — PROGRESS NOTE ADULT - SUBJECTIVE AND OBJECTIVE BOX
SUBJECTIVE:    Patient is a 87y old Male who presents with a chief complaint of Hallucinations (25 Oct 2019 14:26)    Overnight Events:  Patient was seen at bed side this morning. patient denied sob     PAST MEDICAL & SURGICAL HISTORY  H/O hypokalemia  Dyslipidemia  Hypertension  Prostate cancer    SOCIAL HISTORY:  Negative for smoking/alcohol/drug use.     ALLERGIES:  No Known Allergies    MEDICATIONS:  STANDING MEDICATIONS  amLODIPine   Tablet 5 milliGRAM(s) Oral daily  aspirin enteric coated 81 milliGRAM(s) Oral daily  atorvastatin 20 milliGRAM(s) Oral at bedtime  calcium carbonate 1250 mG  + Vitamin D (OsCal 500 + D) 1 Tablet(s) Oral daily  dextrose 5% + sodium chloride 0.9%. 1000 milliLiter(s) IV Continuous <Continuous>  docusate sodium 100 milliGRAM(s) Oral two times a day  donepezil 5 milliGRAM(s) Oral at bedtime  enoxaparin Injectable 40 milliGRAM(s) SubCutaneous daily  hydrochlorothiazide 12.5 milliGRAM(s) Oral daily  multivitamin 1 Tablet(s) Oral daily  penicillin   G benzathine Injectable 1.2 Million Unit(s) IntraMuscular every 7 days  senna 1 Tablet(s) Oral daily  tamsulosin 0.4 milliGRAM(s) Oral at bedtime    PRN MEDICATIONS  haloperidol    Injectable 0.5 milliGRAM(s) IntraMuscular daily PRN    VITALS:   T(F): 97.6  HR: 58  BP: 116/56  RR: 18  SpO2: 100%    10-24-19 @ 07:01  -  10-25-19 @ 07:00  --------------------------------------------------------  IN: 240 mL / OUT: 0 mL / NET: 240 mL    10-25-19 @ 07:01  -  10-25-19 @ 14:48  --------------------------------------------------------  IN: 1755 mL / OUT: 400 mL / NET: 1355 mL      PHYSICAL EXAM:  . General: NAD  . HEENT  · Respiratory: Breath Sounds equal & clear to  auscultation, no accessory muscle use  · Cardiovascular: Regular rate & rhythm, normal S1, S2; no murmurs, gallops or rubs; no S3, S4  · Gastrointestinal	Soft, non-tender, no hepatosplenomegaly, normal bowel sounds  · Genitourinary: Normal genitalia; no lesions; no discharge  · Extremities:	No cyanosis, clubbing or edema  · Skin no macular rashs, no lacerations.   . Neuro:    LABS:                        11.5   6.13  )-----------( 249      ( 25 Oct 2019 05:22 )             34.2     10-25    144  |  110  |  8<L>  ----------------------------<  101<H>  3.3<L>   |  22  |  0.9    Ca    8.2<L>      25 Oct 2019 05:22    TPro  5.8<L>  /  Alb  3.2<L>  /  TBili  0.3  /  DBili  x   /  AST  30  /  ALT  18  /  AlkPhos  46  10-25              CARDIAC MARKERS ( 24 Oct 2019 07:40 )  x     / <0.01 ng/mL / x     / x     / x      CARDIAC MARKERS ( 23 Oct 2019 18:24 )  x     / <0.01 ng/mL / x     / x     / x              10-24-19 @ 07:01  -  10-25-19 @ 07:00  --------------------------------------------------------  IN: 240 mL / OUT: 0 mL / NET: 240 mL    10-25-19 @ 07:01  -  10-25-19 @ 14:48  --------------------------------------------------------  IN: 1755 mL / OUT: 400 mL / NET: 1355 mL          Radiology:

## 2019-10-25 NOTE — PROGRESS NOTE ADULT - ASSESSMENT
A 87y old Male who presents with a chief complaint of Currently admitted to medicine with the primary diagnosis of Hallucinations    #Visual Hallucinations 2/2 Delirium likely due to Dehydration vs. Neurosyphilis   - CTH revealed cerebellar and cerebral atrophy-->Not significant in a patient of this age   - B12, Folate, TSH wnl.   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  - C/w Pen G intragluteal, 1 dose per week for 3 weeks  - CSF: Negative for cell count, VRDL negative  - Cryptococcus and acid fast negative  - lyme pcr -  - Haldol IM 0.5 PRN for agitation  - f/u MRI   -carotid duplex- less than 50 % stenosis of right ica and left ica     Bradycardia with 1st degree AV block and PAC  - echo resulted   - cont tele 24 hours  -outpatient work up ELEN    hypokalemia  - supplemented 40 meq    # Hyperlipidemia  c/w statin     # Hypertension  - remains labile in the 120s-170s  - c/w amlodipine and HCTZ    # Metastatic prostate cancer -  Patient was on Zytiga, d/c for unknown reason  c/w tamsulosin     GI PPX: Not indicated    DVT PPX: Lovenox   Activity: ambulate as tolerated   Dispo: Short term rehab placement

## 2019-10-25 NOTE — PHARMACOTHERAPY INTERVENTION NOTE - COMMENTS
2 benzathine pen G ORDERS. I recommended on profile review to delete one.
I recommended dvt prophlaxis on profile review. lovenox was ordered
I poke with Dr Rae (spectra [id number]) patient's k level is 4.5 and recommended to hold kcl 20 meq q24h- will consider
KCL solution will be changed to powser

## 2019-10-25 NOTE — CHART NOTE - NSCHARTNOTEFT_GEN_A_CORE
Registered Dietitian Follow-Up     Patient Profile Reviewed                           Yes [v]   No []     Nutrition History Previously Obtained        Yes []  No [v] - pt remains confused/agitated, family not available      Pertinent Subjective Information:      Pertinent Medical Interventions: Visual hallucinations 2/2 Delirium likely due to Dehydration vs. Neurosyphilis. On abx for syphilis. Agitation- c/w haldol. Bradycardia. Metastatic prostate cancer. DNI/DNR. Pt needs short term rehab placement per MD.     Diet order: DASH/TLC  + Ensure Enlive q 12hrs      Anthropometrics:  - Ht. 180.34cm  - Wt. no new weights, last 128lbs  - %wt change  - BMI 17.9  - IBW 172lbs     Pertinent Lab Data: 10/25 BUN 8, K -3.3, GLu 101, Alb 3.2     Pertinent Meds: lovenox, IV abx, D5+ 0.9%NS@75ml/hr, haloperidol, colace, Norvasc, Lipitor, MVI, Oscal 500+D,      Physical Findings:  - Appearance:  - GI function:  - Tubes: none  - Oral/Mouth cavity:  - Skin: inatcct     Nutrition Requirements  Weight Used: 128lbs/58.2kg     Estimated energy needs Continue [v]  Adjust []: 1920-2030kcal (MSJx1.5AF vs 30-35kcal/kg) mets prostate ca, underwt, likely to meet PCM criteria  Estimated protein needs Continue [v]  Adjust []:75-87g (1.3-1.5g/kg) as above  Estimated fluid needs Continue [v]  Adjust []: 1ml/kcal or per LIP      Nutrient Intake: not meeting needs, pt intake varies 0-40%        [v] Previous Nutrition Diagnosis:  Inadequate Oral Intake.            [v] Ongoing          [] Resolved    [] No active nutrition diagnosis identified at this time     Nutrition Diagnostic #1  Problem:  Etiology:  Statement:     Nutrition Diagnostic #2  Problem:  Etiology:  Statement:     Nutrition Intervention: Meal and snacks. Supplements     Goal/Expected Outcome: PO intake > 50% meals, snacks and supplements over next 4 days.      Indicator/Monitoring: Will monitor diet order, energy intake, labs, body composition, NFPF.     Recommended: correct Potassium, liberalize diet to Regular, continue PO supplements. Encourage at meal times, consider appetite stimulant. Consider kcal counts x 3 days ( ?? short term EN if consistent w/ GOC). Will follow. Registered Dietitian Follow-Up     Patient Profile Reviewed                           Yes [v]   No []     Nutrition History Previously Obtained        Yes []  No [v] - pt remains confused/agitated, family not available      Pertinent Subjective Information: Pt observed during breakfast today, eating well but needs 1:1 feed. Pt is leaving for echo. As per PCA pt refused to eat yesterday 2/2 agitation. Unable to perform full NFPE at this time, will reassess upon f/u.       Pertinent Medical Interventions: Visual hallucinations 2/2 Delirium likely due to Dehydration vs. Neurosyphilis. On abx for syphilis. Agitation- c/w haldol. Bradycardia. Metastatic prostate cancer. DNI/DNR. Pt needs short term rehab placement per MD.     Diet order: DASH/TLC  + Ensure Enlive q 12hrs      Anthropometrics:  - Ht. 180.34cm  - Wt. no new weights, last 128lbs (RN to retake weight after pt is back from echo)  - %wt change  - BMI 17.9  - IBW 172lbs     Pertinent Lab Data: 10/25 BUN 8, K -3.3, GLu 101, Alb 3.2     Pertinent Meds: lovenox, IV abx, D5+ 0.9%NS@75ml/hr, haloperidol, colace, Norvasc, Lipitor, MVI, Oscal 500+D,      Physical Findings:  - Appearance: alert, confused, eating breakfast.   - GI function: last BM 10/23  - Tubes: none  - Oral/Mouth cavity: no chewing/ swallowing difficulties reported   - Skin: intact     Nutrition Requirements  Weight Used: 128lbs/58.2kg     Estimated energy needs Continue [v]  Adjust []: 1920-2030kcal (MSJx1.5AF vs 30-35kcal/kg)  - prostate ca, underwt  Estimated protein needs Continue [v]  Adjust []:75-87g (1.3-1.5g/kg) as above  Estimated fluid needs Continue [v]  Adjust []: 1ml/kcal or per LIP      Nutrient Intake: pt had good appetite this morning, however refused to eat yesterday as per PCA report. Per EMR po intake varies 0-40%        [v] Previous Nutrition Diagnosis:  Inadequate Oral Intake.            [v] Ongoing/improving          [] Resolved    [] No active nutrition diagnosis identified at this time     Nutrition Diagnostic #1  Problem:  Etiology:  Statement:     Nutrition Diagnostic #2  Problem:  Etiology:  Statement:     Nutrition Intervention: Meal and snacks. Supplements     Goal/Expected Outcome: PO intake > 50% meals, snacks and supplements over next 4 days.      Indicator/Monitoring: Will monitor diet order, energy intake, labs, body composition, NFPF.     Recommended: correct Potassium, liberalize diet to Regular, continue PO supplements. Encourage at meal times, consider appetite stimulant. Consider kcal counts x 3 days ( ?? short term EN if consistent w/ GOC). Will follow.

## 2019-10-25 NOTE — PROGRESS NOTE ADULT - SUBJECTIVE AND OBJECTIVE BOX
SUBJECTIVE:    Patient is a 87y old Male who presents with a chief complaint of Hallucinations (24 Oct 2019 20:54)    Overnight Events:  Patient was seen at bed side this morning     PAST MEDICAL & SURGICAL HISTORY  H/O hypokalemia  Dyslipidemia  Hypertension  Prostate cancer    SOCIAL HISTORY:  Negative for smoking/alcohol/drug use.     ALLERGIES:  No Known Allergies    MEDICATIONS:  STANDING MEDICATIONS  amLODIPine   Tablet 5 milliGRAM(s) Oral daily  aspirin enteric coated 81 milliGRAM(s) Oral daily  atorvastatin 20 milliGRAM(s) Oral at bedtime  calcium carbonate 1250 mG  + Vitamin D (OsCal 500 + D) 1 Tablet(s) Oral daily  dextrose 5% + sodium chloride 0.9%. 1000 milliLiter(s) IV Continuous <Continuous>  docusate sodium 100 milliGRAM(s) Oral two times a day  donepezil 5 milliGRAM(s) Oral at bedtime  enoxaparin Injectable 40 milliGRAM(s) SubCutaneous daily  hydrochlorothiazide 12.5 milliGRAM(s) Oral daily  multivitamin 1 Tablet(s) Oral daily  penicillin   G benzathine Injectable 1.2 Million Unit(s) IntraMuscular every 7 days  senna 1 Tablet(s) Oral daily  tamsulosin 0.4 milliGRAM(s) Oral at bedtime    PRN MEDICATIONS  haloperidol    Injectable 0.5 milliGRAM(s) IntraMuscular daily PRN    VITALS:   T(F): 97.6  HR: 58  BP: 116/56  RR: 18  SpO2: 100%    10-24-19 @ 07:01  -  10-25-19 @ 07:00  --------------------------------------------------------  IN: 240 mL / OUT: 0 mL / NET: 240 mL    10-25-19 @ 07:01  -  10-25-19 @ 14:26  --------------------------------------------------------  IN: 1755 mL / OUT: 400 mL / NET: 1355 mL      PHYSICAL EXAM:      LABS:                        11.5   6.13  )-----------( 249      ( 25 Oct 2019 05:22 )             34.2     10-25    144  |  110  |  8<L>  ----------------------------<  101<H>  3.3<L>   |  22  |  0.9    Ca    8.2<L>      25 Oct 2019 05:22    TPro  5.8<L>  /  Alb  3.2<L>  /  TBili  0.3  /  DBili  x   /  AST  30  /  ALT  18  /  AlkPhos  46  10-25              CARDIAC MARKERS ( 24 Oct 2019 07:40 )  x     / <0.01 ng/mL / x     / x     / x      CARDIAC MARKERS ( 23 Oct 2019 18:24 )  x     / <0.01 ng/mL / x     / x     / x              10-24-19 @ 07:01  -  10-25-19 @ 07:00  --------------------------------------------------------  IN: 240 mL / OUT: 0 mL / NET: 240 mL    10-25-19 @ 07:01  -  10-25-19 @ 14:26  --------------------------------------------------------  IN: 1755 mL / OUT: 400 mL / NET: 1355 mL          Radiology: SUBJECTIVE:    Patient is a 87y old Male who presents with a chief complaint of Hallucinations (24 Oct 2019 20:54)    Overnight Events:  Patient was seen at bed side this morning . patient is A&Ox3 today. he was able to conversate with me. patient denied chest pain sob,n/v, abdominal pain, diarrhea /constipation.     PAST MEDICAL & SURGICAL HISTORY  H/O hypokalemia  Dyslipidemia  Hypertension  Prostate cancer    SOCIAL HISTORY:  Negative for smoking/alcohol/drug use.     ALLERGIES:  No Known Allergies    MEDICATIONS:  STANDING MEDICATIONS  amLODIPine   Tablet 5 milliGRAM(s) Oral daily  aspirin enteric coated 81 milliGRAM(s) Oral daily  atorvastatin 20 milliGRAM(s) Oral at bedtime  calcium carbonate 1250 mG  + Vitamin D (OsCal 500 + D) 1 Tablet(s) Oral daily  dextrose 5% + sodium chloride 0.9%. 1000 milliLiter(s) IV Continuous <Continuous>  docusate sodium 100 milliGRAM(s) Oral two times a day  donepezil 5 milliGRAM(s) Oral at bedtime  enoxaparin Injectable 40 milliGRAM(s) SubCutaneous daily  hydrochlorothiazide 12.5 milliGRAM(s) Oral daily  multivitamin 1 Tablet(s) Oral daily  penicillin   G benzathine Injectable 1.2 Million Unit(s) IntraMuscular every 7 days  senna 1 Tablet(s) Oral daily  tamsulosin 0.4 milliGRAM(s) Oral at bedtime    PRN MEDICATIONS  haloperidol    Injectable 0.5 milliGRAM(s) IntraMuscular daily PRN    VITALS:   T(F): 97.6  HR: 58  BP: 116/56  RR: 18  SpO2: 100%    10-24-19 @ 07:01  -  10-25-19 @ 07:00  --------------------------------------------------------  IN: 240 mL / OUT: 0 mL / NET: 240 mL    10-25-19 @ 07:01  -  10-25-19 @ 14:26  --------------------------------------------------------  IN: 1755 mL / OUT: 400 mL / NET: 1355 mL      PHYSICAL EXAM:  GENERAL: NAD, appears stated age, frail  HEENT: NCAT  CHEST/LUNG: CTAB  HEART: Regular rate and rhythm; s1 s2 appreciated, No murmurs, rubs, or gallops  ABDOMEN: Soft, Nontender, Nondistended; Bowel sounds present  EXTREMITIES: No LE edema b/l  NERVOUS SYSTEM: A&Ox3 non focal     LABS:                        11.5   6.13  )-----------( 249      ( 25 Oct 2019 05:22 )             34.2     10-25    144  |  110  |  8<L>  ----------------------------<  101<H>  3.3<L>   |  22  |  0.9    Ca    8.2<L>      25 Oct 2019 05:22    TPro  5.8<L>  /  Alb  3.2<L>  /  TBili  0.3  /  DBili  x   /  AST  30  /  ALT  18  /  AlkPhos  46  10-25              CARDIAC MARKERS ( 24 Oct 2019 07:40 )  x     / <0.01 ng/mL / x     / x     / x      CARDIAC MARKERS ( 23 Oct 2019 18:24 )  x     / <0.01 ng/mL / x     / x     / x              10-24-19 @ 07:01  -  10-25-19 @ 07:00  --------------------------------------------------------  IN: 240 mL / OUT: 0 mL / NET: 240 mL    10-25-19 @ 07:01  -  10-25-19 @ 14:26  --------------------------------------------------------  IN: 1755 mL / OUT: 400 mL / NET: 1355 mL          Radiology:

## 2019-10-26 LAB
ALBUMIN SERPL ELPH-MCNC: 3.6 G/DL — SIGNIFICANT CHANGE UP (ref 3.5–5.2)
ALP SERPL-CCNC: 49 U/L — SIGNIFICANT CHANGE UP (ref 30–115)
ALT FLD-CCNC: 22 U/L — SIGNIFICANT CHANGE UP (ref 0–41)
ANION GAP SERPL CALC-SCNC: 11 MMOL/L — SIGNIFICANT CHANGE UP (ref 7–14)
ANION GAP SERPL CALC-SCNC: 9 MMOL/L — SIGNIFICANT CHANGE UP (ref 7–14)
APTT BLD: 37.8 SEC — SIGNIFICANT CHANGE UP (ref 27–39.2)
AST SERPL-CCNC: 29 U/L — SIGNIFICANT CHANGE UP (ref 0–41)
BILIRUB SERPL-MCNC: <0.2 MG/DL — SIGNIFICANT CHANGE UP (ref 0.2–1.2)
BUN SERPL-MCNC: 10 MG/DL — SIGNIFICANT CHANGE UP (ref 10–20)
BUN SERPL-MCNC: 10 MG/DL — SIGNIFICANT CHANGE UP (ref 10–20)
CALCIUM SERPL-MCNC: 8.5 MG/DL — SIGNIFICANT CHANGE UP (ref 8.5–10.1)
CALCIUM SERPL-MCNC: 8.7 MG/DL — SIGNIFICANT CHANGE UP (ref 8.5–10.1)
CHLORIDE SERPL-SCNC: 102 MMOL/L — SIGNIFICANT CHANGE UP (ref 98–110)
CHLORIDE SERPL-SCNC: 107 MMOL/L — SIGNIFICANT CHANGE UP (ref 98–110)
CO2 SERPL-SCNC: 26 MMOL/L — SIGNIFICANT CHANGE UP (ref 17–32)
CO2 SERPL-SCNC: 26 MMOL/L — SIGNIFICANT CHANGE UP (ref 17–32)
CREAT SERPL-MCNC: 0.8 MG/DL — SIGNIFICANT CHANGE UP (ref 0.7–1.5)
CREAT SERPL-MCNC: 0.9 MG/DL — SIGNIFICANT CHANGE UP (ref 0.7–1.5)
GLUCOSE BLDC GLUCOMTR-MCNC: 126 MG/DL — HIGH (ref 70–99)
GLUCOSE SERPL-MCNC: 90 MG/DL — SIGNIFICANT CHANGE UP (ref 70–99)
GLUCOSE SERPL-MCNC: 95 MG/DL — SIGNIFICANT CHANGE UP (ref 70–99)
HCT VFR BLD CALC: 34.2 % — LOW (ref 42–52)
HCT VFR BLD CALC: 34.3 % — LOW (ref 42–52)
HGB BLD-MCNC: 11.4 G/DL — LOW (ref 14–18)
HGB BLD-MCNC: 11.5 G/DL — LOW (ref 14–18)
INR BLD: 1.02 RATIO — SIGNIFICANT CHANGE UP (ref 0.65–1.3)
MAGNESIUM SERPL-MCNC: 1.8 MG/DL — SIGNIFICANT CHANGE UP (ref 1.8–2.4)
MCHC RBC-ENTMCNC: 29.7 PG — SIGNIFICANT CHANGE UP (ref 27–31)
MCHC RBC-ENTMCNC: 29.8 PG — SIGNIFICANT CHANGE UP (ref 27–31)
MCHC RBC-ENTMCNC: 33.2 G/DL — SIGNIFICANT CHANGE UP (ref 32–37)
MCHC RBC-ENTMCNC: 33.6 G/DL — SIGNIFICANT CHANGE UP (ref 32–37)
MCV RBC AUTO: 88.4 FL — SIGNIFICANT CHANGE UP (ref 80–94)
MCV RBC AUTO: 89.6 FL — SIGNIFICANT CHANGE UP (ref 80–94)
NRBC # BLD: 0 /100 WBCS — SIGNIFICANT CHANGE UP (ref 0–0)
NRBC # BLD: 0 /100 WBCS — SIGNIFICANT CHANGE UP (ref 0–0)
PLATELET # BLD AUTO: 265 K/UL — SIGNIFICANT CHANGE UP (ref 130–400)
PLATELET # BLD AUTO: 285 K/UL — SIGNIFICANT CHANGE UP (ref 130–400)
POTASSIUM SERPL-MCNC: 3.5 MMOL/L — SIGNIFICANT CHANGE UP (ref 3.5–5)
POTASSIUM SERPL-MCNC: 3.6 MMOL/L — SIGNIFICANT CHANGE UP (ref 3.5–5)
POTASSIUM SERPL-SCNC: 3.5 MMOL/L — SIGNIFICANT CHANGE UP (ref 3.5–5)
POTASSIUM SERPL-SCNC: 3.6 MMOL/L — SIGNIFICANT CHANGE UP (ref 3.5–5)
PROT SERPL-MCNC: 6.4 G/DL — SIGNIFICANT CHANGE UP (ref 6–8)
PROTHROM AB SERPL-ACNC: 11.7 SEC — SIGNIFICANT CHANGE UP (ref 9.95–12.87)
RBC # BLD: 3.83 M/UL — LOW (ref 4.7–6.1)
RBC # BLD: 3.87 M/UL — LOW (ref 4.7–6.1)
RBC # FLD: 14.1 % — SIGNIFICANT CHANGE UP (ref 11.5–14.5)
RBC # FLD: 14.3 % — SIGNIFICANT CHANGE UP (ref 11.5–14.5)
SODIUM SERPL-SCNC: 139 MMOL/L — SIGNIFICANT CHANGE UP (ref 135–146)
SODIUM SERPL-SCNC: 142 MMOL/L — SIGNIFICANT CHANGE UP (ref 135–146)
TROPONIN T SERPL-MCNC: 0.01 NG/ML — SIGNIFICANT CHANGE UP
WBC # BLD: 5.49 K/UL — SIGNIFICANT CHANGE UP (ref 4.8–10.8)
WBC # BLD: 6.19 K/UL — SIGNIFICANT CHANGE UP (ref 4.8–10.8)
WBC # FLD AUTO: 5.49 K/UL — SIGNIFICANT CHANGE UP (ref 4.8–10.8)
WBC # FLD AUTO: 6.19 K/UL — SIGNIFICANT CHANGE UP (ref 4.8–10.8)

## 2019-10-26 PROCEDURE — 71260 CT THORAX DX C+: CPT | Mod: 26

## 2019-10-26 PROCEDURE — 72125 CT NECK SPINE W/O DYE: CPT | Mod: 26

## 2019-10-26 PROCEDURE — 99223 1ST HOSP IP/OBS HIGH 75: CPT

## 2019-10-26 PROCEDURE — 70450 CT HEAD/BRAIN W/O DYE: CPT | Mod: 26

## 2019-10-26 PROCEDURE — 74177 CT ABD & PELVIS W/CONTRAST: CPT | Mod: 26

## 2019-10-26 RX ORDER — NYSTATIN CREAM 100000 [USP'U]/G
1 CREAM TOPICAL THREE TIMES A DAY
Refills: 0 | Status: DISCONTINUED | OUTPATIENT
Start: 2019-10-26 | End: 2019-10-26

## 2019-10-26 RX ADMIN — Medication 1 TABLET(S): at 15:07

## 2019-10-26 RX ADMIN — Medication 100 MILLIGRAM(S): at 05:34

## 2019-10-26 RX ADMIN — ATORVASTATIN CALCIUM 20 MILLIGRAM(S): 80 TABLET, FILM COATED ORAL at 22:09

## 2019-10-26 RX ADMIN — SODIUM CHLORIDE 75 MILLILITER(S): 9 INJECTION, SOLUTION INTRAVENOUS at 15:07

## 2019-10-26 RX ADMIN — TAMSULOSIN HYDROCHLORIDE 0.4 MILLIGRAM(S): 0.4 CAPSULE ORAL at 22:09

## 2019-10-26 RX ADMIN — Medication 12.5 MILLIGRAM(S): at 05:34

## 2019-10-26 RX ADMIN — AMLODIPINE BESYLATE 5 MILLIGRAM(S): 2.5 TABLET ORAL at 05:34

## 2019-10-26 RX ADMIN — DONEPEZIL HYDROCHLORIDE 5 MILLIGRAM(S): 10 TABLET, FILM COATED ORAL at 22:09

## 2019-10-26 RX ADMIN — Medication 81 MILLIGRAM(S): at 15:07

## 2019-10-26 NOTE — CONSULT NOTE ADULT - CONSULT REASON
bradycardia
Fall out of bed
Hallucinations
Syphilis
bradycardia
stroke code
debility, dementia, hallucinations

## 2019-10-26 NOTE — CONSULT NOTE ADULT - ASSESSMENT
87 year old male s/p unwitnessed fall out of bed    [facility] SCAN pedning 87 year old male s/p unwitnessed fall out of bed  pan scan done  no acute traumatic injuries

## 2019-10-26 NOTE — CONSULT NOTE ADULT - ATTENDING COMMENTS
Patient examined and was quite drowsy after having received relatively small dose of Haldol.  Additionally his legs were very stiff.  Surprising that he was described as trying to run or walk away earlier (prior to haldol).     Description of hallucinations occurring relatively early into dementing condition may reflect Lewy Body Disease.  Alzheimer disease is not fully excluded or depression with psychotic features.      Recommend:  1.  Avoid Haldol.  If antipsychotics are needed then would begin quetiapine 25 mg daily with option for additional tablet if needed.  2.  Begin donepezil 5 mg po daily .  3.  Falls precautions.  4.  Sitter.  5.   evaluation for home safety and placement as not safe to live by himself.
Patient seen and examined with PA during stroke code.  Patient was less responsive and then started becoming more responsive during CT.  He was non focal but he was drooling from side of mouth initially but was also turned to the side of the drool    Plan as above
88 yo male patient.  s/p fall, unwitnessed on the floor. Out of bed.  Blood because IV came out.  Pan scan negative for acute traumatic injuries.  Multiple liver metastases.    No surgical intervention.  Medical treatment as per medicine.
Pt seen and examined 10/24    Cardiologist: None  PCP: Dr. Vernon     88 yo M with history of metastatic prostate cancer, HTN, DL, hypokalemia, dementia admitted with hallucinations, being treated for tertiary syphilis. Altered mental status this AM, HCT negative. We were consulted from bradycardia 30-50s. Lyme PCR negative. TSH normal.     Currently NSR in the 80s with occ blocked PACs.  Pt is very confused but does not have any complaints at this moment other than "want my money back."    Impression:  NSR with blocked PACs  Loss of consciousness    Plan:   - Cont to monitor on tele  - Patient not on any AV harjinder agents, please avoid  - 2D echo toe karmen for structural disease and assess EF  - OP workup for ELEN, noted to be vira while asleep  - No further EP work up if above is negative. Please recall as needed

## 2019-10-26 NOTE — PROVIDER CONTACT NOTE (FALL NOTIFICATION) - BACKGROUND
Patient was admitted for hallucinations and was on fall precautions with bed alarm last assessed to be active at 12PM.

## 2019-10-26 NOTE — PROGRESS NOTE ADULT - SUBJECTIVE AND OBJECTIVE BOX
SUBJECTIVE:    Patient is a 87y old Male who presents with a chief complaint of Hallucinations (25 Oct 2019 14:26)    Currently admitted to medicine with the primary diagnosis of Hallucinations     Today is hospital day 12d. This morning he is resting comfortably in bed and reports no new issues or overnight events.     PAST MEDICAL & SURGICAL HISTORY  H/O hypokalemia  Dyslipidemia  Hypertension  Prostate cancer    SOCIAL HISTORY:  Negative for smoking/alcohol/drug use.     ALLERGIES:  No Known Allergies    MEDICATIONS:  STANDING MEDICATIONS  amLODIPine   Tablet 5 milliGRAM(s) Oral daily  aspirin enteric coated 81 milliGRAM(s) Oral daily  atorvastatin 20 milliGRAM(s) Oral at bedtime  calcium carbonate 1250 mG  + Vitamin D (OsCal 500 + D) 1 Tablet(s) Oral daily  dextrose 5% + sodium chloride 0.9%. 1000 milliLiter(s) IV Continuous <Continuous>  docusate sodium 100 milliGRAM(s) Oral two times a day  donepezil 5 milliGRAM(s) Oral at bedtime  enoxaparin Injectable 40 milliGRAM(s) SubCutaneous daily  hydrochlorothiazide 12.5 milliGRAM(s) Oral daily  multivitamin 1 Tablet(s) Oral daily  penicillin   G benzathine Injectable 1.2 Million Unit(s) IntraMuscular every 7 days  senna 1 Tablet(s) Oral daily  tamsulosin 0.4 milliGRAM(s) Oral at bedtime    PRN MEDICATIONS  haloperidol    Injectable 0.5 milliGRAM(s) IntraMuscular daily PRN    VITALS:   T(F): 97.9  HR: 59  BP: 130/63  RR: 18  SpO2: --    LABS:                        11.4   5.49  )-----------( 265      ( 26 Oct 2019 05:53 )             34.3     10-26    142  |  107  |  10  ----------------------------<  95  3.6   |  26  |  0.8    Ca    8.5      26 Oct 2019 05:53    TPro  5.8<L>  /  Alb  3.2<L>  /  TBili  0.3  /  DBili  x   /  AST  30  /  ALT  18  /  AlkPhos  46  10-25                  RADIOLOGY:  < from: MR Head No Cont (10.25.19 @ 23:31) >  TERPRETATION:  Clinical History / Reason for exam: Change in mental   status, hallucinations.    MRI OF THE BRAIN WITHOUT AND WITH CONTRAST    TECHNIQUE:    Multiplanar multisequence imaging of the brain was performed.    COMPARISON:    Noncontrast CT scan of the brain dated October 23, 2019.    FINDINGS:    The third and lateral ventricles are mildly enlarged as are the cortical   sulci consistent with a mild degree of cortical atrophy. The fourth   ventricle is normal in size and position.    There is no shift of the midline structures.    On the T2 and FLAIR images there are patchy foci of hyperintense signal   intensity in the periventricular and subcortical white matter which is   nonspecific and differential diagnostic possibilities include chronic   ischemic change, foci of gliosis or demyelination.    No MRI evidence to suggest acute ischemic change.    Abnormal signal left mastoid tip consistent with chronic inflammatory   changes.    Mucosal thickening in the right maxillary sinus.    Subcutaneous lipoma in the left frontal extracalvarial soft tissues.    IMPRESSION:    No MRI evidence to suggest acute ischemic change.      < end of copied text >    PHYSICAL EXAM:  GEN: No acute distress  LUNGS: Clear to auscultation bilaterally   HEART: Regular  ABD: Soft, non-tender, non-distended.  EXT: NC/NC/NE/2+PP/ELMORE/Skin Intact.   NEURO: awake but confused    Intravenous access:   NG tube:   Nicole Catheter:

## 2019-10-26 NOTE — PROVIDER CONTACT NOTE (FALL NOTIFICATION) - ASSESSMENT
Patient IV access was reestablished. Vitals were assessed per MAR. Rapid response and code trauma were called.

## 2019-10-26 NOTE — CONSULT NOTE ADULT - REASON FOR ADMISSION
Hallucinations

## 2019-10-26 NOTE — PROGRESS NOTE ADULT - ASSESSMENT
87 year old male Metastatic prostate cancer, hypertension, dyslipidemia, hypokalemia      >>> visual hallucination      positive FTA ABS positive on penicillin G per week for 3 weeks    csf negative   haldol for agitation    mri brain unremarkable   carotid duplex  no significant stenosis     >>> hypokalemia resolved  >>> hyperlipidemia stable    >>> metastatic prostate cancer  >>> bradycardia asymptomatic   disposition     STR stable to discharge    spoke w resident and RN    [id number]  [id number]

## 2019-10-26 NOTE — CHART NOTE - NSCHARTNOTEFT_GEN_A_CORE
Patient hit his head per nurse who heard a fall. patient has no lacerations or bruises,no bony deformities, /84 RR 18 pulse 60 . will stallings ct head abd pelvis chest spine neck. labs ordered. trauma code called. c-collar placed. patient is non responsive to command. patient waxes and wanes and baseline at times is non responsive. Patient hit his head per nurse who heard a fall. patient has no lacerations or bruises,no bony deformities, /84 RR 18 pulse 60 . will stallings ct head abd pelvis chest spine neck. labs ordered. trauma code called. c-collar placed. patient is non responsive to command. patient waxes and wanes and baseline at times is non responsive.    spoke w resident     aware of plan

## 2019-10-26 NOTE — PROVIDER CONTACT NOTE (FALL NOTIFICATION) - SITUATION
Patient found on floor by Elvia OCHOA Nurse states she heard a loud thud. I was called into room stating patient fell on floor. Patient was lifted to bed with several nurses.

## 2019-10-26 NOTE — CONSULT NOTE ADULT - SUBJECTIVE AND OBJECTIVE BOX
TRAUMA ACTIVATION LEVEL:  code    MECHANISM OF INJURY:  unwitnessed fall out of bed    GCS: 14 	E: 4	V: 4	M: 6    HPI: 87 year old male admitted for worsening mental status at home, worked up for stroke on 10/23 CT head normal at that time, code trauma called today when patient was found down next to the bed, nursing staff states they heard a loud thump and found him holding his head. He is altered at baseline during this admission a&ox2.        PAST MEDICAL & SURGICAL HISTORY:  H/O hypokalemia  Dyslipidemia  Hypertension  Prostate cancer      Allergies  No Known Allergies        Home Medications:  amLODIPine 5 mg oral tablet: 1 tab(s) orally once a day (14 Oct 2019 19:59)  atorvastatin 20 mg oral tablet: 1 tab(s) orally once a day (14 Oct 2019 19:59)  calcium (as citrate)-vitamin D 250 mg-500 intl units oral tablet, chewable: 2 tab(s) orally 2 times a day (with meals) (14 Oct 2019 20:00)  hydroCHLOROthiazide 12.5 mg oral capsule: 1 cap(s) orally once a day (14 Oct 2019 19:59)  tamsulosin 0.4 mg oral capsule: 1 cap(s) orally once a day (14 Oct 2019 19:59)      ROS: 10-system review is otherwise negative except HPI above.      Primary Survey:    A - airway intact  B - bilateral breath sounds and good chest rise  C - palpable pulses in all extremities  D - GCS 15 on arrival, ELMORE  Exposure obtained    Vital Signs Last 24 Hrs  T(C): 36.6 (26 Oct 2019 04:30), Max: 36.7 (25 Oct 2019 20:30)  T(F): 97.9 (26 Oct 2019 04:30), Max: 98 (25 Oct 2019 20:30)  HR: 59 (26 Oct 2019 04:30) (59 - 64)  BP: 130/63 (26 Oct 2019 04:30) (109/53 - 130/63)  RR: 18 (26 Oct 2019 04:30) (18 - 18)  SpO2: --    Secondary Survey:   General: NAD  HEENT: EOMI, PEERLA. no scalp lacerations, +left side forehead hematoma  Neck: Soft, midline trachea. no cspine tenderness  Chest: No chest wall tenderness. or subq  emphysema   Cardiac: S1, S2, RRR  Respiratory: Bilateral breath sounds, clear and equal bilaterally  Abdomen: Soft, non-distended, non-tender, no rebound,   Groin: Normal appearing, pelvis stable   Ext: palp radial b/l UE, b/l DP palp in Lower Extrem.   Back: no TTP, no palpable runoff/stepoff/deformity  Rectal: No sherrill blood, MATTHEW with good tone      LABS:  POCT Blood Glucose.: 126 mg/dL (26 Oct 2019 12:15)                          11.5   6.19  )-----------( 285      ( 26 Oct 2019 12:22 )             34.2         10-26    139  |  102  |  10  ----------------------------<  90  3.5   |  26  |  0.9      Calcium, Total Serum: 8.7 mg/dL (10-26-19 @ 12:32)      LFTs:             6.4  | <0.2 | 29       ------------------[49      ( 26 Oct 2019 12:32 )  3.6  | x    | 22             ABG - ( 23 Oct 2019 10:04 )  pH: 7.43  /  pCO2: 41    /  pO2: 70    / HCO3: 27    / Base Excess: 2.8   /  SaO2: 94            Coags:     11.70  ----< 1.02    ( 26 Oct 2019 12:25 )     37.8        CARDIAC MARKERS ( 26 Oct 2019 12:32 )  x     / 0.01 ng/mL / x     / x     / x                  RADIOLOGY & ADDITIONAL STUDIES:      < from: CT Head No Cont (10.26.19 @ 12:49) >  IMPRESSION:  In comparison with the prior noncontrast CT scan of the brain dated   October 23, 2019:  Stable examination.  No acute intracranial hemorrhage.  < end of copied text >    < from: CT Cervical Spine No Cont (10.26.19 @ 12:50) >  IMPRESSION:  1.  No acute fracture demonstrated.  2.  Straightening of normal cervical lordosis may be secondary to patient   positioning or muscle thousand.  3.  Multilevel degenerative changes.  4.  Findings consistent with bony metastatic disease in the T2 vertebrae.  < end of copied text > TRAUMA ACTIVATION LEVEL:  code    MECHANISM OF INJURY:  unwitnessed fall out of bed    GCS: 14 	E: 4	V: 4	M: 6    HPI: 87 year old male admitted for worsening mental status at home, worked up for stroke on 10/23 CT head normal at that time, code trauma called today when patient was found down next to the bed, nursing staff states they heard a loud thump and found him holding his head. He is altered at baseline during this admission a&ox2.        PAST MEDICAL & SURGICAL HISTORY:  H/O hypokalemia  Dyslipidemia  Hypertension  Prostate cancer      Allergies  No Known Allergies        Home Medications:  amLODIPine 5 mg oral tablet: 1 tab(s) orally once a day (14 Oct 2019 19:59)  atorvastatin 20 mg oral tablet: 1 tab(s) orally once a day (14 Oct 2019 19:59)  calcium (as citrate)-vitamin D 250 mg-500 intl units oral tablet, chewable: 2 tab(s) orally 2 times a day (with meals) (14 Oct 2019 20:00)  hydroCHLOROthiazide 12.5 mg oral capsule: 1 cap(s) orally once a day (14 Oct 2019 19:59)  tamsulosin 0.4 mg oral capsule: 1 cap(s) orally once a day (14 Oct 2019 19:59)      ROS: 10-system review is otherwise negative except HPI above.      Primary Survey:    A - airway intact  B - bilateral breath sounds and good chest rise  C - palpable pulses in all extremities  D - GCS 15 on arrival, ELMORE  Exposure obtained    Vital Signs Last 24 Hrs  T(C): 36.6 (26 Oct 2019 04:30), Max: 36.7 (25 Oct 2019 20:30)  T(F): 97.9 (26 Oct 2019 04:30), Max: 98 (25 Oct 2019 20:30)  HR: 59 (26 Oct 2019 04:30) (59 - 64)  BP: 130/63 (26 Oct 2019 04:30) (109/53 - 130/63)  RR: 18 (26 Oct 2019 04:30) (18 - 18)  SpO2: --    Secondary Survey:   General: NAD  HEENT: EOMI, PEERLA. no scalp lacerations, +left side forehead hematoma  Neck: Soft, midline trachea. no cspine tenderness  Chest: No chest wall tenderness. or subq  emphysema   Cardiac: S1, S2, RRR  Respiratory: Bilateral breath sounds, clear and equal bilaterally  Abdomen: Soft, non-distended, non-tender, no rebound,   Groin: Normal appearing, pelvis stable   Ext: palp radial b/l UE, b/l DP palp in Lower Extrem.   Back: no TTP, no palpable runoff/stepoff/deformity  Rectal: No sherrill blood, MATTHEW with good tone      LABS:  POCT Blood Glucose.: 126 mg/dL (26 Oct 2019 12:15)                          11.5   6.19  )-----------( 285      ( 26 Oct 2019 12:22 )             34.2         10-26    139  |  102  |  10  ----------------------------<  90  3.5   |  26  |  0.9      Calcium, Total Serum: 8.7 mg/dL (10-26-19 @ 12:32)      LFTs:             6.4  | <0.2 | 29       ------------------[49      ( 26 Oct 2019 12:32 )  3.6  | x    | 22             ABG - ( 23 Oct 2019 10:04 )  pH: 7.43  /  pCO2: 41    /  pO2: 70    / HCO3: 27    / Base Excess: 2.8   /  SaO2: 94            Coags:     11.70  ----< 1.02    ( 26 Oct 2019 12:25 )     37.8        CARDIAC MARKERS ( 26 Oct 2019 12:32 )  x     / 0.01 ng/mL / x     / x     / x                  RADIOLOGY & ADDITIONAL STUDIES:      < from: CT Head No Cont (10.26.19 @ 12:49) >  IMPRESSION:  In comparison with the prior noncontrast CT scan of the brain dated   October 23, 2019:  Stable examination.  No acute intracranial hemorrhage.  < end of copied text >    < from: CT Cervical Spine No Cont (10.26.19 @ 12:50) >  IMPRESSION:  1.  No acute fracture demonstrated.  2.  Straightening of normal cervical lordosis may be secondary to patient   positioning or muscle thousand.  3.  Multilevel degenerative changes.  4.  Findings consistent with bony metastatic disease in the T2 vertebrae.  < end of copied text >    < from: CT Chest w/ IV Cont (10.26.19 @ 13:08) >  IMPRESSION:  1.  No CT evidence of acute intrathoracic or abdominopelvic traumatic   injury.  2.  Since 6/26/18, increased size of now solid nodule, now 2.0 cm from   1.1 cm. consider nonemergent PET scan imaging and/or nonemergent tissue   sampling  3.  Scattered sclerotic osseous lesions as detailed above. The largest of   these are unchanged. A few subcentimeter lesions appear slightly   increased in size.  4.  Other nonacute/stable findings as described above.  < end of copied text >    < from: CT Abdomen and Pelvis w/ IV Cont (10.26.19 @ 13:08) >  IMPRESSION:  1.  No CT evidence of acute intrathoracic or abdominopelvic traumatic   injury.  2.  Since 6/26/18, increased size of now solid nodule, now 2.0 cm from   1.1 cm. consider nonemergent PET scan imaging and/or nonemergent tissue   sampling  3.  Scattered sclerotic osseous lesions as detailed above. The largest of   these are unchanged. A few subcentimeter lesions appear slightly   increased in size.  4.  Other nonacute/stable findings as described above.  < end of copied text >

## 2019-10-26 NOTE — PROGRESS NOTE ADULT - SUBJECTIVE AND OBJECTIVE BOX
SUBJECTIVE:    Patient is a 87y old Male who presents with a chief complaint of Hallucinations (26 Oct 2019 11:25)    Overnight Events:none  Patient was seen at bed side this morning . patient denied chest pain sob,n/v, abdominal pain, diarrhea /constipation.   PAST MEDICAL & SURGICAL HISTORY  H/O hypokalemia  Dyslipidemia  Hypertension  Prostate cancer    SOCIAL HISTORY:  Negative for smoking/alcohol/drug use.     ALLERGIES:  No Known Allergies    MEDICATIONS:  STANDING MEDICATIONS  amLODIPine   Tablet 5 milliGRAM(s) Oral daily  aspirin enteric coated 81 milliGRAM(s) Oral daily  atorvastatin 20 milliGRAM(s) Oral at bedtime  calcium carbonate 1250 mG  + Vitamin D (OsCal 500 + D) 1 Tablet(s) Oral daily  dextrose 5% + sodium chloride 0.9%. 1000 milliLiter(s) IV Continuous <Continuous>  docusate sodium 100 milliGRAM(s) Oral two times a day  donepezil 5 milliGRAM(s) Oral at bedtime  enoxaparin Injectable 40 milliGRAM(s) SubCutaneous daily  hydrochlorothiazide 12.5 milliGRAM(s) Oral daily  multivitamin 1 Tablet(s) Oral daily  penicillin   G benzathine Injectable 1.2 Million Unit(s) IntraMuscular every 7 days  senna 1 Tablet(s) Oral daily  tamsulosin 0.4 milliGRAM(s) Oral at bedtime    PRN MEDICATIONS  haloperidol    Injectable 0.5 milliGRAM(s) IntraMuscular daily PRN    VITALS:   T(F): 97.9  HR: 59  BP: 130/63  RR: 18  SpO2: --    10-25-19 @ 07:01  -  10-26-19 @ 07:00  --------------------------------------------------------  IN: 3030 mL / OUT: 700 mL / NET: 2330 mL    10-26-19 @ 07:01  -  10-26-19 @ 11:59  --------------------------------------------------------  IN: 0 mL / OUT: 100 mL / NET: -100 mL      PHYSICAL EXAM:  GENERAL: NAD, appears stated age, frail  HEENT: NCAT  CHEST/LUNG: CTAB  HEART: Regular rate and rhythm; s1 s2 appreciated, No murmurs, rubs, or gallops  ABDOMEN: Soft, Nontender, Nondistended; Bowel sounds present  EXTREMITIES: No LE edema b/l  NERVOUS SYSTEM: A&Ox3 non focal     LABS:                        11.4   5.49  )-----------( 265      ( 26 Oct 2019 05:53 )             34.3     10-26    142  |  107  |  10  ----------------------------<  95  3.6   |  26  |  0.8    Ca    8.5      26 Oct 2019 05:53    TPro  5.8<L>  /  Alb  3.2<L>  /  TBili  0.3  /  DBili  x   /  AST  30  /  ALT  18  /  AlkPhos  46  10-25                      10-25-19 @ 07:01  -  10-26-19 @ 07:00  --------------------------------------------------------  IN: 3030 mL / OUT: 700 mL / NET: 2330 mL    10-26-19 @ 07:01  -  10-26-19 @ 11:59  --------------------------------------------------------  IN: 0 mL / OUT: 100 mL / NET: -100 mL          Radiology:

## 2019-10-26 NOTE — PROGRESS NOTE ADULT - ASSESSMENT
A 87y old Male who presents with a chief complaint of Currently admitted to medicine with the primary diagnosis of Hallucinations    #Visual Hallucinations 2/2 Delirium likely due to Dehydration vs. Neurosyphilis   - CTH revealed cerebellar and cerebral atrophy-->Not significant in a patient of this age   - B12, Folate, TSH wnl.   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  - C/w Pen G intragluteal, 1 dose per week for 3 weeks  - CSF: Negative for cell count, VRDL negative  - Cryptococcus and acid fast negative  - lyme pcr -  - Haldol IM 0.5 PRN for agitation  - MRI - unremarkable  -carotid duplex- less than 50 % stenosis of right ica and left ica     Bradycardia with 1st degree AV block and PAC  - echo resulted   - cont tele 24 hours  -outpatient work up ELEN    hypokalemia  - supplemented 40 meq    # Hyperlipidemia  c/w statin     # Hypertension  - remains labile in the 120s-170s  - c/w amlodipine and HCTZ    # Metastatic prostate cancer -  Patient was on Zytiga, d/c for unknown reason  c/w tamsulosin     GI PPX: Not indicated    DVT PPX: Lovenox   Activity: ambulate as tolerated   Dispo: Short term rehab placement   , after PT

## 2019-10-26 NOTE — CONSULT NOTE ADULT - CONSULT REQUESTED DATE/TIME
23-Oct-2019 15:01
15-Oct-2019 10:07
17-Oct-2019 15:51
23-Oct-2019 14:51
26-Oct-2019 12:13
23-Oct-2019 12:34
16-Oct-2019 16:15

## 2019-10-27 LAB
ANION GAP SERPL CALC-SCNC: 13 MMOL/L — SIGNIFICANT CHANGE UP (ref 7–14)
BUN SERPL-MCNC: 9 MG/DL — LOW (ref 10–20)
CALCIUM SERPL-MCNC: 8.6 MG/DL — SIGNIFICANT CHANGE UP (ref 8.5–10.1)
CHLORIDE SERPL-SCNC: 105 MMOL/L — SIGNIFICANT CHANGE UP (ref 98–110)
CO2 SERPL-SCNC: 23 MMOL/L — SIGNIFICANT CHANGE UP (ref 17–32)
CREAT SERPL-MCNC: 0.9 MG/DL — SIGNIFICANT CHANGE UP (ref 0.7–1.5)
GLUCOSE SERPL-MCNC: 90 MG/DL — SIGNIFICANT CHANGE UP (ref 70–99)
HCT VFR BLD CALC: 32 % — LOW (ref 42–52)
HGB BLD-MCNC: 10.8 G/DL — LOW (ref 14–18)
MCHC RBC-ENTMCNC: 30 PG — SIGNIFICANT CHANGE UP (ref 27–31)
MCHC RBC-ENTMCNC: 33.8 G/DL — SIGNIFICANT CHANGE UP (ref 32–37)
MCV RBC AUTO: 88.9 FL — SIGNIFICANT CHANGE UP (ref 80–94)
NRBC # BLD: 0 /100 WBCS — SIGNIFICANT CHANGE UP (ref 0–0)
PLATELET # BLD AUTO: 262 K/UL — SIGNIFICANT CHANGE UP (ref 130–400)
POTASSIUM SERPL-MCNC: 4.3 MMOL/L — SIGNIFICANT CHANGE UP (ref 3.5–5)
POTASSIUM SERPL-SCNC: 4.3 MMOL/L — SIGNIFICANT CHANGE UP (ref 3.5–5)
RBC # BLD: 3.6 M/UL — LOW (ref 4.7–6.1)
RBC # FLD: 14 % — SIGNIFICANT CHANGE UP (ref 11.5–14.5)
SODIUM SERPL-SCNC: 141 MMOL/L — SIGNIFICANT CHANGE UP (ref 135–146)
WBC # BLD: 6.99 K/UL — SIGNIFICANT CHANGE UP (ref 4.8–10.8)
WBC # FLD AUTO: 6.99 K/UL — SIGNIFICANT CHANGE UP (ref 4.8–10.8)

## 2019-10-27 RX ADMIN — Medication 1 TABLET(S): at 14:44

## 2019-10-27 RX ADMIN — Medication 12.5 MILLIGRAM(S): at 05:44

## 2019-10-27 RX ADMIN — Medication 81 MILLIGRAM(S): at 14:44

## 2019-10-27 RX ADMIN — ATORVASTATIN CALCIUM 20 MILLIGRAM(S): 80 TABLET, FILM COATED ORAL at 21:54

## 2019-10-27 RX ADMIN — PENICILLIN G BENZATHINE 1.2 MILLION UNIT(S): 1200000 INJECTION, SUSPENSION INTRAMUSCULAR at 18:20

## 2019-10-27 RX ADMIN — Medication 100 MILLIGRAM(S): at 18:21

## 2019-10-27 RX ADMIN — AMLODIPINE BESYLATE 5 MILLIGRAM(S): 2.5 TABLET ORAL at 05:44

## 2019-10-27 RX ADMIN — DONEPEZIL HYDROCHLORIDE 5 MILLIGRAM(S): 10 TABLET, FILM COATED ORAL at 21:52

## 2019-10-27 RX ADMIN — ENOXAPARIN SODIUM 40 MILLIGRAM(S): 100 INJECTION SUBCUTANEOUS at 14:45

## 2019-10-27 RX ADMIN — Medication 100 MILLIGRAM(S): at 05:44

## 2019-10-27 RX ADMIN — Medication 1 TABLET(S): at 14:45

## 2019-10-27 RX ADMIN — SENNA PLUS 1 TABLET(S): 8.6 TABLET ORAL at 14:45

## 2019-10-27 RX ADMIN — TAMSULOSIN HYDROCHLORIDE 0.4 MILLIGRAM(S): 0.4 CAPSULE ORAL at 21:51

## 2019-10-27 NOTE — CHART NOTE - NSCHARTNOTEFT_GEN_A_CORE
Trauma tertiary Survey    Patient seen and examined. Patient laying in bed comfortably. No areas of pain on exam. No other complaints at this time.     PHYSICAL EXAM:  Craniofacial: Atraumatic, No deformity  Eyes: Pupil Size equal B/L and RTL. EOMI  Oropharynx: Atraumatic  Neck: Non-tender, No deformity, Trachea midline.  Chest: Equal breath sounds, non-tender, No deformity or crepitus  Heart: RSR, No murmurs, no rubs  Abdomen: Atraumatic, Non-tender, non-distended. No hepatosplenomegaly  Pelvis: Stable, non-tender, no deformity  : Not examined  Back: Spine non-tender, Atraumatic  Extremities: Atraumatic, no deformities, normal pulses, moving all extremities   Neurologic: CN intact, Motor intact throughout. Sensation intact/normal throughout.  Psych: Mood and affect normal. Judgment and insight normal        All images/reports reviewed. No further traumatic work-up warranted.

## 2019-10-27 NOTE — PROGRESS NOTE ADULT - SUBJECTIVE AND OBJECTIVE BOX
Patient was seen and examined. Spoke with RN. Chart reviewed.  No events overnight.  Vital Signs Last 24 Hrs  T(F): 97.8 (27 Oct 2019 05:52), Max: 97.8 (26 Oct 2019 20:39)  HR: 69 (27 Oct 2019 05:52) (54 - 69)  BP: 140/65 (27 Oct 2019 05:52) (126/56 - 181/80)  SpO2: --  MEDICATIONS  (STANDING):  amLODIPine   Tablet 5 milliGRAM(s) Oral daily  aspirin enteric coated 81 milliGRAM(s) Oral daily  atorvastatin 20 milliGRAM(s) Oral at bedtime  calcium carbonate 1250 mG  + Vitamin D (OsCal 500 + D) 1 Tablet(s) Oral daily  dextrose 5% + sodium chloride 0.9%. 1000 milliLiter(s) (75 mL/Hr) IV Continuous <Continuous>  docusate sodium 100 milliGRAM(s) Oral two times a day  donepezil 5 milliGRAM(s) Oral at bedtime  enoxaparin Injectable 40 milliGRAM(s) SubCutaneous daily  hydrochlorothiazide 12.5 milliGRAM(s) Oral daily  multivitamin 1 Tablet(s) Oral daily  penicillin   G benzathine Injectable 1.2 Million Unit(s) IntraMuscular every 7 days  senna 1 Tablet(s) Oral daily  tamsulosin 0.4 milliGRAM(s) Oral at bedtime    MEDICATIONS  (PRN):  haloperidol    Injectable 0.5 milliGRAM(s) IntraMuscular daily PRN Agitation    Labs:                        10.8   6.99  )-----------( 262      ( 27 Oct 2019 06:20 )             32.0                         11.5   6.19  )-----------( 285      ( 26 Oct 2019 12:22 )             34.2     27 Oct 2019 06:20    141    |  105    |  9      ----------------------------<  90     4.3     |  23     |  0.9    26 Oct 2019 12:32    139    |  102    |  10     ----------------------------<  90     3.5     |  26     |  0.9      Ca    8.6        27 Oct 2019 06:20  Ca    8.7        26 Oct 2019 12:32  Mg     1.8       26 Oct 2019 12:32    TPro  6.4    /  Alb  3.6    /  TBili  <0.2   /  DBili  x      /  AST  29     /  ALT  22     /  AlkPhos  49     26 Oct 2019 12:32    PT/INR - ( 26 Oct 2019 12:25 )   PT: 11.70 sec;   INR: 1.02 ratio         PTT - ( 26 Oct 2019 12:25 )  PTT:37.8 sec      General: comfortable, NAD  Neurology:  nonfocal  Head:  Normocephalic, atraumatic  ENT:  Mucosa moist, no ulcerations  Neck:  Supple, no JVD,   Skin: no breakdowns (as per RN)  Resp: CTA B/L  CV: RRR, S1S2,   GI: Soft, NT, bowel sounds  MS: No edema, + peripheral pulses, FROM all 4 extremity      A/P:  87y old Man with metastatic prostate Ca with Visual Hallucinations 2/2 Delirium 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, but FTA-ABS positive- is positive for tertiary syphilis  - C/w Pen G intragluteal, 1 dose per week for 3 weeks  - CSF: Negative for cell count, VRDL negative  - Cryptococcus and acid fast negative    PT/rehab    OOB    fall precautions    aspiration precautions    DC planning to SNF  DVT prophylaxis  Decubitus prevention- all measures as per RN protocol  Please call or text me with any questions or updates

## 2019-10-28 LAB
ANION GAP SERPL CALC-SCNC: 13 MMOL/L — SIGNIFICANT CHANGE UP (ref 7–14)
BUN SERPL-MCNC: 13 MG/DL — SIGNIFICANT CHANGE UP (ref 10–20)
CALCIUM SERPL-MCNC: 8.2 MG/DL — LOW (ref 8.5–10.1)
CHLORIDE SERPL-SCNC: 105 MMOL/L — SIGNIFICANT CHANGE UP (ref 98–110)
CO2 SERPL-SCNC: 26 MMOL/L — SIGNIFICANT CHANGE UP (ref 17–32)
CREAT SERPL-MCNC: 0.9 MG/DL — SIGNIFICANT CHANGE UP (ref 0.7–1.5)
GLUCOSE SERPL-MCNC: 107 MG/DL — HIGH (ref 70–99)
HCT VFR BLD CALC: 30.8 % — LOW (ref 42–52)
HGB BLD-MCNC: 10.3 G/DL — LOW (ref 14–18)
MCHC RBC-ENTMCNC: 29.8 PG — SIGNIFICANT CHANGE UP (ref 27–31)
MCHC RBC-ENTMCNC: 33.4 G/DL — SIGNIFICANT CHANGE UP (ref 32–37)
MCV RBC AUTO: 89 FL — SIGNIFICANT CHANGE UP (ref 80–94)
NRBC # BLD: 0 /100 WBCS — SIGNIFICANT CHANGE UP (ref 0–0)
PLATELET # BLD AUTO: 277 K/UL — SIGNIFICANT CHANGE UP (ref 130–400)
POTASSIUM SERPL-MCNC: 3.5 MMOL/L — SIGNIFICANT CHANGE UP (ref 3.5–5)
POTASSIUM SERPL-SCNC: 3.5 MMOL/L — SIGNIFICANT CHANGE UP (ref 3.5–5)
RBC # BLD: 3.46 M/UL — LOW (ref 4.7–6.1)
RBC # FLD: 14 % — SIGNIFICANT CHANGE UP (ref 11.5–14.5)
SODIUM SERPL-SCNC: 144 MMOL/L — SIGNIFICANT CHANGE UP (ref 135–146)
WBC # BLD: 6.78 K/UL — SIGNIFICANT CHANGE UP (ref 4.8–10.8)
WBC # FLD AUTO: 6.78 K/UL — SIGNIFICANT CHANGE UP (ref 4.8–10.8)

## 2019-10-28 RX ADMIN — Medication 1 TABLET(S): at 11:09

## 2019-10-28 RX ADMIN — Medication 1 TABLET(S): at 11:10

## 2019-10-28 RX ADMIN — SODIUM CHLORIDE 75 MILLILITER(S): 9 INJECTION, SOLUTION INTRAVENOUS at 04:52

## 2019-10-28 RX ADMIN — Medication 100 MILLIGRAM(S): at 05:58

## 2019-10-28 RX ADMIN — HALOPERIDOL DECANOATE 0.5 MILLIGRAM(S): 100 INJECTION INTRAMUSCULAR at 20:27

## 2019-10-28 RX ADMIN — Medication 81 MILLIGRAM(S): at 11:10

## 2019-10-28 RX ADMIN — Medication 12.5 MILLIGRAM(S): at 05:58

## 2019-10-28 RX ADMIN — AMLODIPINE BESYLATE 5 MILLIGRAM(S): 2.5 TABLET ORAL at 05:58

## 2019-10-28 RX ADMIN — ATORVASTATIN CALCIUM 20 MILLIGRAM(S): 80 TABLET, FILM COATED ORAL at 20:23

## 2019-10-28 RX ADMIN — SODIUM CHLORIDE 75 MILLILITER(S): 9 INJECTION, SOLUTION INTRAVENOUS at 23:24

## 2019-10-28 RX ADMIN — DONEPEZIL HYDROCHLORIDE 5 MILLIGRAM(S): 10 TABLET, FILM COATED ORAL at 20:23

## 2019-10-28 RX ADMIN — ENOXAPARIN SODIUM 40 MILLIGRAM(S): 100 INJECTION SUBCUTANEOUS at 11:10

## 2019-10-28 RX ADMIN — TAMSULOSIN HYDROCHLORIDE 0.4 MILLIGRAM(S): 0.4 CAPSULE ORAL at 20:24

## 2019-10-28 RX ADMIN — SENNA PLUS 1 TABLET(S): 8.6 TABLET ORAL at 11:09

## 2019-10-28 NOTE — PROGRESS NOTE ADULT - SUBJECTIVE AND OBJECTIVE BOX
SUBJECTIVE:    Patient is a 87y old Male who presents with a chief complaint of Hallucinations (28 Oct 2019 11:01)    Overnight Events:  Patient was seen at bed side this morning. patient knows name and denies chest pain, sob, n/v, abdominal pain.     PAST MEDICAL & SURGICAL HISTORY  H/O hypokalemia  Dyslipidemia  Hypertension  Prostate cancer    SOCIAL HISTORY:  Negative for smoking/alcohol/drug use.     ALLERGIES:  No Known Allergies    MEDICATIONS:  STANDING MEDICATIONS  amLODIPine   Tablet 5 milliGRAM(s) Oral daily  aspirin enteric coated 81 milliGRAM(s) Oral daily  atorvastatin 20 milliGRAM(s) Oral at bedtime  calcium carbonate 1250 mG  + Vitamin D (OsCal 500 + D) 1 Tablet(s) Oral daily  dextrose 5% + sodium chloride 0.9%. 1000 milliLiter(s) IV Continuous <Continuous>  docusate sodium 100 milliGRAM(s) Oral two times a day  donepezil 5 milliGRAM(s) Oral at bedtime  enoxaparin Injectable 40 milliGRAM(s) SubCutaneous daily  hydrochlorothiazide 12.5 milliGRAM(s) Oral daily  multivitamin 1 Tablet(s) Oral daily  penicillin   G benzathine Injectable 1.2 Million Unit(s) IntraMuscular every 7 days  senna 1 Tablet(s) Oral daily  tamsulosin 0.4 milliGRAM(s) Oral at bedtime    PRN MEDICATIONS  haloperidol    Injectable 0.5 milliGRAM(s) IntraMuscular daily PRN    VITALS:   T(F): 96.4  HR: 89  BP: 122/68  RR: 18  SpO2: 100%    10-27-19 @ 07:01  -  10-28-19 @ 07:00  --------------------------------------------------------  IN: 1935 mL / OUT: 0 mL / NET: 1935 mL      PHYSICAL EXAM:  GENERAL: NAD, appears stated age, frail  HEENT: NCAT  CHEST/LUNG: CTAB  HEART: Regular rate and rhythm; s1 s2 appreciated, No murmurs, rubs, or gallops  ABDOMEN: Soft, Nontender, Nondistended; Bowel sounds present  EXTREMITIES: No LE edema b/l  NERVOUS SYSTEM: A&Ox1-2     LABS:                        10.3   6.78  )-----------( 277      ( 28 Oct 2019 05:00 )             30.8     10-28    144  |  105  |  13  ----------------------------<  107<H>  3.5   |  26  |  0.9    Ca    8.2<L>      28 Oct 2019 05:00                        10-27-19 @ 07:01  -  10-28-19 @ 07:00  --------------------------------------------------------  IN: 1935 mL / OUT: 0 mL / NET: 1935 mL          Radiology:

## 2019-10-28 NOTE — PROGRESS NOTE ADULT - ASSESSMENT
A 87y old Male who presents with a chief complaint of Currently admitted to medicine with the primary diagnosis of Hallucinations    #Visual Hallucinations 2/2 Delirium likely due to Dehydration vs. Neurosyphilis   - CTH revealed cerebellar and cerebral atrophy-->Not significant in a patient of this age   - B12, Folate, TSH wnl.   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  - C/w Pen G intragluteal, 1 dose per week for 3 weeks  - CSF: Negative for cell count, VRDL negative  - Cryptococcus and acid fast negative  - lyme pcr -  - Haldol IM 0.5 PRN for agitation  - MRI - unremarkable  -carotid duplex- less than 50 % stenosis of right ica and left ica   cleared for Discharge by neurology and infectious disease    Bradycardia with 1st degree AV block and PAC  - f/u outpatient for ELEN work up causing bradycardia per EP      # Hyperlipidemia  c/w statin     # Hypertension  - remains labile in the 120s-170s  - c/w amlodipine and HCTZ    # Metastatic prostate cancer -  Patient was on Zytiga, d/c for unknown reason  c/w tamsulosin     GI PPX: Not indicated    DVT PPX: Lovenox   Activity: ambulate as tolerated   Dispo: Short term rehab placement   , after PT

## 2019-10-28 NOTE — PROGRESS NOTE ADULT - SUBJECTIVE AND OBJECTIVE BOX
Patient was seen and examined. Spoke with RN. Chart reviewed.  No events overnight.  Vital Signs Last 24 Hrs  T(F): 96.5 (28 Oct 2019 05:37), Max: 97.8 (27 Oct 2019 13:11)  HR: 60 (28 Oct 2019 05:37) (39 - 75)  BP: 116/55 (28 Oct 2019 05:37) (96/53 - 126/58)  SpO2: 100% (28 Oct 2019 08:21) (100% - 100%)  MEDICATIONS  (STANDING):  amLODIPine   Tablet 5 milliGRAM(s) Oral daily  aspirin enteric coated 81 milliGRAM(s) Oral daily  atorvastatin 20 milliGRAM(s) Oral at bedtime  calcium carbonate 1250 mG  + Vitamin D (OsCal 500 + D) 1 Tablet(s) Oral daily  dextrose 5% + sodium chloride 0.9%. 1000 milliLiter(s) (75 mL/Hr) IV Continuous <Continuous>  docusate sodium 100 milliGRAM(s) Oral two times a day  donepezil 5 milliGRAM(s) Oral at bedtime  enoxaparin Injectable 40 milliGRAM(s) SubCutaneous daily  hydrochlorothiazide 12.5 milliGRAM(s) Oral daily  multivitamin 1 Tablet(s) Oral daily  penicillin   G benzathine Injectable 1.2 Million Unit(s) IntraMuscular every 7 days  senna 1 Tablet(s) Oral daily  tamsulosin 0.4 milliGRAM(s) Oral at bedtime    MEDICATIONS  (PRN):  haloperidol    Injectable 0.5 milliGRAM(s) IntraMuscular daily PRN Agitation    Labs:                        10.3   6.78  )-----------( 277      ( 28 Oct 2019 05:00 )             30.8                         10.8   6.99  )-----------( 262      ( 27 Oct 2019 06:20 )             32.0     28 Oct 2019 05:00    144    |  105    |  13     ----------------------------<  107    3.5     |  26     |  0.9    27 Oct 2019 06:20    141    |  105    |  9      ----------------------------<  90     4.3     |  23     |  0.9      Ca    8.2        28 Oct 2019 05:00  Ca    8.6        27 Oct 2019 06:20  Mg     1.8       26 Oct 2019 12:32    TPro  6.4    /  Alb  3.6    /  TBili  <0.2   /  DBili  x      /  AST  29     /  ALT  22     /  AlkPhos  49     26 Oct 2019 12:32    PT/INR - ( 26 Oct 2019 12:25 )   PT: 11.70 sec;   INR: 1.02 ratio         PTT - ( 26 Oct 2019 12:25 )  PTT:37.8 sec      General: comfortable, NAD  Neurology: nonfocal  Head:  Normocephalic, atraumatic  ENT:  Mucosa moist, no ulcerations  Neck:  Supple, no JVD,   Resp: CTA B/L  CV: RRR, S1S2,   GI: Soft, NT, bowel sounds  MS: No edema, + peripheral pulses,       A/P:  87y old Man with metastatic prostate Ca with Visual Hallucinations 2/2 Delirium 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, but FTA-ABS positive- is positive for tertiary syphilis  - C/w Pen G intragluteal, 1 dose per week for 3 weeks  - CSF: Negative for cell count, VRDL negative  - Cryptococcus and acid fast negative    PT/rehab    OOB    fall precautions    aspiration precautions    DC planning to SNF  DVT prophylaxis  Decubitus prevention- all measures as per RN protocol  Please call or text me with any questions or updates

## 2019-10-29 ENCOUNTER — TRANSCRIPTION ENCOUNTER (OUTPATIENT)
Age: 84
End: 2019-10-29

## 2019-10-29 VITALS
HEART RATE: 59 BPM | DIASTOLIC BLOOD PRESSURE: 63 MMHG | TEMPERATURE: 96 F | RESPIRATION RATE: 118 BRPM | SYSTOLIC BLOOD PRESSURE: 118 MMHG

## 2019-10-29 DIAGNOSIS — I10 ESSENTIAL (PRIMARY) HYPERTENSION: ICD-10-CM

## 2019-10-29 RX ORDER — HALOPERIDOL DECANOATE 100 MG/ML
0.5 INJECTION INTRAMUSCULAR
Refills: 0 | Status: DISCONTINUED | OUTPATIENT
Start: 2019-10-29 | End: 2019-10-29

## 2019-10-29 RX ORDER — HALOPERIDOL DECANOATE 100 MG/ML
1 INJECTION INTRAMUSCULAR
Qty: 0 | Refills: 0 | DISCHARGE
Start: 2019-10-29

## 2019-10-29 RX ORDER — PENICILLIN G BENZATHINE 1200000 [IU]/2ML
1 INJECTION, SUSPENSION INTRAMUSCULAR
Qty: 0 | Refills: 0 | DISCHARGE
Start: 2019-10-29 | End: 2019-11-03

## 2019-10-29 RX ORDER — DONEPEZIL HYDROCHLORIDE 10 MG/1
1 TABLET, FILM COATED ORAL
Qty: 0 | Refills: 0 | DISCHARGE
Start: 2019-10-29

## 2019-10-29 RX ADMIN — Medication 1 TABLET(S): at 11:38

## 2019-10-29 RX ADMIN — SENNA PLUS 1 TABLET(S): 8.6 TABLET ORAL at 11:38

## 2019-10-29 RX ADMIN — HALOPERIDOL DECANOATE 0.5 MILLIGRAM(S): 100 INJECTION INTRAMUSCULAR at 17:11

## 2019-10-29 RX ADMIN — ENOXAPARIN SODIUM 40 MILLIGRAM(S): 100 INJECTION SUBCUTANEOUS at 11:37

## 2019-10-29 RX ADMIN — HALOPERIDOL DECANOATE 0.5 MILLIGRAM(S): 100 INJECTION INTRAMUSCULAR at 00:23

## 2019-10-29 RX ADMIN — Medication 81 MILLIGRAM(S): at 11:38

## 2019-10-29 RX ADMIN — Medication 100 MILLIGRAM(S): at 17:11

## 2019-10-29 NOTE — PROGRESS NOTE ADULT - REASON FOR ADMISSION
Hallucinations

## 2019-10-29 NOTE — PROGRESS NOTE ADULT - SUBJECTIVE AND OBJECTIVE BOX
Hospital Day:  15d    Subjective:    Patient is a 87y old  Male who presents with a chief complaint of Hallucinations (28 Oct 2019 16:01)      Past Medical Hx:   H/O hypokalemia  Dyslipidemia  Hypertension  Prostate cancer    Past Sx:    Allergies:  No Known Allergies    Current Meds:   Standng Meds:  amLODIPine   Tablet 5 milliGRAM(s) Oral daily  aspirin enteric coated 81 milliGRAM(s) Oral daily  atorvastatin 20 milliGRAM(s) Oral at bedtime  calcium carbonate 1250 mG  + Vitamin D (OsCal 500 + D) 1 Tablet(s) Oral daily  dextrose 5% + sodium chloride 0.9%. 1000 milliLiter(s) (75 mL/Hr) IV Continuous <Continuous>  docusate sodium 100 milliGRAM(s) Oral two times a day  donepezil 5 milliGRAM(s) Oral at bedtime  enoxaparin Injectable 40 milliGRAM(s) SubCutaneous daily  hydrochlorothiazide 12.5 milliGRAM(s) Oral daily  multivitamin 1 Tablet(s) Oral daily  penicillin   G benzathine Injectable 1.2 Million Unit(s) IntraMuscular every 7 days  senna 1 Tablet(s) Oral daily  tamsulosin 0.4 milliGRAM(s) Oral at bedtime    PRN Meds:  haloperidol    Injectable 0.5 milliGRAM(s) IntraMuscular daily PRN Agitation    HOME MEDICATIONS:  amLODIPine 5 mg oral tablet: 1 tab(s) orally once a day  atorvastatin 20 mg oral tablet: 1 tab(s) orally once a day  calcium (as citrate)-vitamin D 250 mg-500 intl units oral tablet, chewable: 2 tab(s) orally 2 times a day (with meals)  hydroCHLOROthiazide 12.5 mg oral capsule: 1 cap(s) orally once a day  tamsulosin 0.4 mg oral capsule: 1 cap(s) orally once a day      Vital Signs:   T(F): 97.4 (10-29-19 @ 04:45), Max: 97.4 (10-29-19 @ 04:30)  HR: 84 (10-29-19 @ 04:45) (47 - 89)  BP: 98/53 (10-29-19 @ 04:45) (98/53 - 122/68)  RR: 18 (10-29-19 @ 04:45) (18 - 18)  SpO2: 99% (10-29-19 @ 04:45) (99% - 100%)      10-28-19 @ 07:01  -  10-29-19 @ 07:00  --------------------------------------------------------  IN: 0 mL / OUT: 5 mL / NET: -5 mL        Physical Exam:   GENERAL: NAD  HEENT: NCAT  CHEST/LUNG: CTAB  HEART: Regular rate and rhythm; s1 s2 appreciated, No murmurs, rubs, or gallops  ABDOMEN: Soft, Nontender, Nondistended; Bowel sounds present  EXTREMITIES: No LE edema b/l  NERVOUS SYSTEM:  Alert & Oriented X3        Labs:                         10.3   6.78  )-----------( 277      ( 28 Oct 2019 05:00 )             30.8       28 Oct 2019 05:00    144    |  105    |  13     ----------------------------<  107    3.5     |  26     |  0.9      Ca    8.2        28 Oct 2019 05:00                Troponin 0.01, CKMB --, CK -- 10-26-19 @ 12:32

## 2019-10-29 NOTE — DISCHARGE NOTE NURSING/CASE MANAGEMENT/SOCIAL WORK - NSDCPEEMAIL_GEN_ALL_CORE
[facility] for Tobacco Control email [email or web address]

## 2019-10-29 NOTE — DISCHARGE NOTE PROVIDER - HOSPITAL COURSE
87y old Man with metastatic prostate Ca with Visual Hallucinations 2/2 Delirium likely due to Neurosyphilis     History of Present Illness goes back to 9/2019 when patient suddenly started having altered behaviour per the son. He was more angry and defiant than usual. The patient then started having hallucinations witnessed by the son as he was called by the patient saying there is a lady in his bed, described as a dressed skeleton however she would not talk. He would also see children playing in his living room silently without responding to him. He denies hearing voices when no one was present. He says he believes these people were trying to steal thing from his home and take his home which is worrying him the most to the point he called the police 3 times. These hallucinations occur mainly at nighttime.     Of note, patient's wife passed away in 6/2019 after long illness and per the son the patient has not been the same since.         In the ED, vitals were stable, labs were unremarkable, UA was negative and a CT scan showed cerebral and cerebellar atrophy. 87y old Man with metastatic prostate cancer, H/O hypokalemia, Dyslipidemia, Hypertensive presented to ED with visual hallucinations.     History of Present Illness goes back to 9/2019 when patient suddenly started having altered behaviour per the son. He was more angry and defiant than usual. The patient then started having hallucinations witnessed by the son as he was called by the patient saying there is a lady in his bed, described as a dressed skeleton however she would not talk. He would also see children playing in his living room silently without responding to him. He denies hearing voices when no one was present. He says he believes these people were trying to steal thing from his home and take his home which is worrying him the most to the point he called the police 3 times. These hallucinations occur mainly at nighttime.     Of note, patient's wife passed away in 6/2019 after long illness and per the son the patient has not been the same since.         In the ED, vitals were stable, labs were unremarkable, UA was negative and a CT scan showed cerebral and cerebellar atrophy.     Admitted to medical floors, was subsequently diagnosed with tertiary syphilis and is being treated with Pen G IM one dose per week for 3 weeks. Pt was pending placement to nursing home. Is currently stable and ready to be discharged.

## 2019-10-29 NOTE — DISCHARGE NOTE PROVIDER - NSDCMRMEDTOKEN_GEN_ALL_CORE_FT
amLODIPine 5 mg oral tablet: 1 tab(s) orally once a day  atorvastatin 20 mg oral tablet: 1 tab(s) orally once a day  calcium (as citrate)-vitamin D 250 mg-500 intl units oral tablet, chewable: 2 tab(s) orally 2 times a day (with meals)  hydroCHLOROthiazide 12.5 mg oral capsule: 1 cap(s) orally once a day  Multiple Vitamins oral tablet: 1 tab(s) orally once a day  penicillin G benzathine: 1 dose(s) intramuscular once a week for one week. Please take one dose on 11/3/19. This medication is to be administered once per week. This will be the last dose necessary to complete abx regimen  potassium chloride 20 mEq oral powder for reconstitution: 40 each orally once a day   tamsulosin 0.4 mg oral capsule: 1 cap(s) orally once a day amLODIPine 5 mg oral tablet: 1 tab(s) orally once a day  atorvastatin 20 mg oral tablet: 1 tab(s) orally once a day  calcium (as citrate)-vitamin D 250 mg-500 intl units oral tablet, chewable: 2 tab(s) orally 2 times a day (with meals)  donepezil 5 mg oral tablet: 1 tab(s) orally once a day (at bedtime)  haloperidol 0.5 mg oral tablet: 1 tab(s) orally 2 times a day  hydroCHLOROthiazide 12.5 mg oral capsule: 1 cap(s) orally once a day  Multiple Vitamins oral tablet: 1 tab(s) orally once a day  penicillin G benzathine: 1 dose(s) intramuscular once a week for one week. Please take one dose on 11/3/19. This medication is to be administered once per week. This will be the last dose necessary to complete abx regimen  potassium chloride 20 mEq oral powder for reconstitution: 40 each orally once a day   tamsulosin 0.4 mg oral capsule: 1 cap(s) orally once a day

## 2019-10-29 NOTE — DISCHARGE NOTE NURSING/CASE MANAGEMENT/SOCIAL WORK - NSDCPEWEB_GEN_ALL_CORE
[facility] for Tobacco Control website --- [email or web address] --- [email or web address]

## 2019-10-29 NOTE — PROGRESS NOTE ADULT - SUBJECTIVE AND OBJECTIVE BOX
Patient was seen and examined. Spoke with RN. Chart reviewed.  Psychotic and aggressive overnight as per RN  Vital Signs Last 24 Hrs  T(F): 97.4 (29 Oct 2019 04:45), Max: 97.4 (29 Oct 2019 04:30)  HR: 84 (29 Oct 2019 04:45) (47 - 89)  BP: 98/53 (29 Oct 2019 04:45) (98/53 - 122/68)  SpO2: 98% (29 Oct 2019 07:44) (98% - 99%)  MEDICATIONS  (STANDING):  amLODIPine   Tablet 5 milliGRAM(s) Oral daily  aspirin enteric coated 81 milliGRAM(s) Oral daily  atorvastatin 20 milliGRAM(s) Oral at bedtime  calcium carbonate 1250 mG  + Vitamin D (OsCal 500 + D) 1 Tablet(s) Oral daily  dextrose 5% + sodium chloride 0.9%. 1000 milliLiter(s) (75 mL/Hr) IV Continuous <Continuous>  docusate sodium 100 milliGRAM(s) Oral two times a day  donepezil 5 milliGRAM(s) Oral at bedtime  enoxaparin Injectable 40 milliGRAM(s) SubCutaneous daily  hydrochlorothiazide 12.5 milliGRAM(s) Oral daily  multivitamin 1 Tablet(s) Oral daily  penicillin   G benzathine Injectable 1.2 Million Unit(s) IntraMuscular every 7 days  senna 1 Tablet(s) Oral daily  tamsulosin 0.4 milliGRAM(s) Oral at bedtime    MEDICATIONS  (PRN):  haloperidol    Injectable 0.5 milliGRAM(s) IntraMuscular daily PRN Agitation    Labs:                        10.3   6.78  )-----------( 277      ( 28 Oct 2019 05:00 )             30.8     28 Oct 2019 05:00    144    |  105    |  13     ----------------------------<  107    3.5     |  26     |  0.9      Ca    8.2        28 Oct 2019 05:00            General: comfortable, NAD at present  Neurology:  nonfocal  Head:  Normocephalic, atraumatic  ENT:  Mucosa moist, no ulcerations  Neck:  Supple, no JVD, )  Resp: CTA B/L  CV: RRR, S1S2,   GI: Soft, NT, bowel sounds  MS: No edema, + peripheral pulses, FROM all 4 extremity      A/P:  87y old Man with metastatic prostate Ca with Visual Hallucinations 2/2 Delirium likely due to Neurosyphilis      Pen G intragluteal, 1 dose per week for 3 weeks    please recall psych for recs on meds to help control agitation    PT/rehab    OOB    fall precautions    aspiration precautions    DC planning to SNF  DVT prophylaxis  Decubitus prevention- all measures as per RN protocol  Please call or text me with any questions or updates

## 2019-10-29 NOTE — CHART NOTE - NSCHARTNOTEFT_GEN_A_CORE
Registered Dietitian Follow-Up     Patient Profile Reviewed                           Yes [v]   No []     Nutrition History Previously Obtained        Yes []  No [x] - pt remains confused/agitated, family not available      Pertinent Subjective Information:   -Pt sleeping soundly at time of assessment and RD asked not to wake pt d/t confusion and occasional agitation. Spoke to PCA who provides 1:1 feed who reports today pt consu,es 50% breakfast but recently has been having adequate intake of 75-80% of meals with assistance. Intake varies greatly with pt mood and confusion. RD recs for supplement and calorie count never placed. RD discussed recs with LIP who reports ply to be d/c'd by end of today. If d/c not complete, please activate recs noted at end of document.      Pertinent Medical Interventions:   1. Visual hallucinations 2/2 Delirium likely due to Dehydration vs. Neurosyphilis.   --s/p unwitnessed fall from bed 10/26: no trauma indicated   --cleared for Discharge by neurology and infectious disease  2. Bradycardia  --f/u outpatient for ELEN work up causing bradycardia per EP  3. Metastatic prostate cancer.  Dispo: likely STR today after PT     Diet order: DASH/TLC       Anthropometrics:  - Ht. 180.34cm  - Wt. 150# (10/29) vs admit wt of 128#. ?discrepancy with wt trends at this time.  will continue with needs using admit wt as wt gain that significant not likely in short period of time as PO intake varies. will monitor   - %wt change  - BMI 17.9  - IBW 172lbs     Pertinent Lab Data: (10/28/19) RBC 3.46, H/H 10.3/30.8, glucose 107, Ca 8.2      Pertinent Meds: aspirin, lovenox, d5 + NS @75mL/hr, colace, senna, amlodipine, atorvastatin, OsCal 500 + vitamin D, hydrochlorothiazide, multivitamin      Physical Findings:  - Appearance: sleeping soundly   - GI function: fecal incontinence, last BM 10/28  - Tubes: none  - Oral/Mouth cavity: no chewing/ swallowing difficulties reported by staff   - Skin: intact     Nutrition Requirements  Weight Used: 128lbs/58.2kg     Estimated energy needs: 1920-2030kcal (MSJx1.5AF vs 30-35kcal/kg)  - prostate ca, underwt  Estimated protein needs: 75-87g (1.3-1.5g/kg) as above  Estimated fluid needs: 1ml/kcal or per LIP    Nutrient Intake: intake varies, pt could benefit from nutrition supplement      [x] Previous Nutrition Diagnosis:  Inadequate Oral Intake.            [x] Ongoing/improving          [] Resolved    Nutrition Intervention: Meal and snacks. Supplements  Recommend:  1. Add Ensure Enlive BID. Continue DASH/TLC diet with 1:1 feeds.   2. Order 3 day calorie count to monitor intake.      Goal/Expected Outcome: PO intake > 50% meals, snacks and supplements over next 3 days.      Indicator/Monitoring: Will monitor diet order, energy intake, labs, body composition, NFPF.

## 2019-10-29 NOTE — DISCHARGE NOTE NURSING/CASE MANAGEMENT/SOCIAL WORK - PATIENT PORTAL LINK FT
You can access the FollowMyHealth Patient Portal offered by [facility] by registering at the following website: [email or web address]. By joining [facility]’s FollowMyHealth portal, you will also be able to view your health information using other applications (apps) compatible with our system.

## 2019-10-29 NOTE — DISCHARGE NOTE PROVIDER - CARE PROVIDER_API CALL
Nicky Vernon ()  Internal Medicine  [address] [place] [address]  Phone: [phone]  Fax: [phone]  Follow Up Time:

## 2019-10-29 NOTE — DISCHARGE NOTE PROVIDER - NSDCCPCAREPLAN_GEN_ALL_CORE_FT
PRINCIPAL DISCHARGE DIAGNOSIS  Diagnosis: Hallucinations  Assessment and Plan of Treatment: PRINCIPAL DISCHARGE DIAGNOSIS  Diagnosis: Tertiary syphilis  Assessment and Plan of Treatment: You were found to have tertiary syphilis via blood work, for which you were treated with intramuscular injections once per week. You have one more dose of antibiotics to complete treatment. Please follow up with your primary care doctor in 1-2 weeks.      SECONDARY DISCHARGE DIAGNOSES  Diagnosis: Delirium  Assessment and Plan of Treatment: You were experiecing delirium which was likely due to a combination of being in a hospital setting, not in familiar surroundings and sundowning. These symptoms should improve with constant reorientation. Please follow with your primary care doctor in 1-2 weeks.

## 2019-10-29 NOTE — PROGRESS NOTE ADULT - PROVIDER SPECIALTY LIST ADULT
Cardiology
Infectious Disease
Infectious Disease
Internal Medicine
Infectious Disease
Internal Medicine

## 2019-11-05 DIAGNOSIS — R44.1 VISUAL HALLUCINATIONS: ICD-10-CM

## 2019-11-05 DIAGNOSIS — G31.9 DEGENERATIVE DISEASE OF NERVOUS SYSTEM, UNSPECIFIED: ICD-10-CM

## 2019-11-05 DIAGNOSIS — G93.41 METABOLIC ENCEPHALOPATHY: ICD-10-CM

## 2019-11-05 DIAGNOSIS — R00.1 BRADYCARDIA, UNSPECIFIED: ICD-10-CM

## 2019-11-05 DIAGNOSIS — C78.7 SECONDARY MALIGNANT NEOPLASM OF LIVER AND INTRAHEPATIC BILE DUCT: ICD-10-CM

## 2019-11-05 DIAGNOSIS — Z85.46 PERSONAL HISTORY OF MALIGNANT NEOPLASM OF PROSTATE: ICD-10-CM

## 2019-11-05 DIAGNOSIS — Z66 DO NOT RESUSCITATE: ICD-10-CM

## 2019-11-05 DIAGNOSIS — Z87.891 PERSONAL HISTORY OF NICOTINE DEPENDENCE: ICD-10-CM

## 2019-11-05 DIAGNOSIS — I44.0 ATRIOVENTRICULAR BLOCK, FIRST DEGREE: ICD-10-CM

## 2019-11-05 DIAGNOSIS — G30.0 ALZHEIMER'S DISEASE WITH EARLY ONSET: ICD-10-CM

## 2019-11-05 DIAGNOSIS — R44.0 AUDITORY HALLUCINATIONS: ICD-10-CM

## 2019-11-05 DIAGNOSIS — F05 DELIRIUM DUE TO KNOWN PHYSIOLOGICAL CONDITION: ICD-10-CM

## 2019-11-05 DIAGNOSIS — Z79.2 LONG TERM (CURRENT) USE OF ANTIBIOTICS: ICD-10-CM

## 2019-11-05 DIAGNOSIS — I10 ESSENTIAL (PRIMARY) HYPERTENSION: ICD-10-CM

## 2019-11-05 DIAGNOSIS — A52.9 LATE SYPHILIS, UNSPECIFIED: ICD-10-CM

## 2019-11-05 DIAGNOSIS — C79.51 SECONDARY MALIGNANT NEOPLASM OF BONE: ICD-10-CM

## 2019-11-05 DIAGNOSIS — E87.6 HYPOKALEMIA: ICD-10-CM

## 2019-11-05 DIAGNOSIS — E86.0 DEHYDRATION: ICD-10-CM

## 2019-11-05 DIAGNOSIS — F02.81 DEMENTIA IN OTHER DISEASES CLASSIFIED ELSEWHERE, UNSPECIFIED SEVERITY, WITH BEHAVIORAL DISTURBANCE: ICD-10-CM

## 2019-11-05 DIAGNOSIS — G31.83 NEUROCOGNITIVE DISORDER WITH LEWY BODIES: ICD-10-CM

## 2019-11-05 DIAGNOSIS — E78.5 HYPERLIPIDEMIA, UNSPECIFIED: ICD-10-CM

## 2019-11-16 LAB
CULTURE RESULTS: SIGNIFICANT CHANGE UP
SPECIMEN SOURCE: SIGNIFICANT CHANGE UP

## 2021-08-11 NOTE — BEHAVIORAL HEALTH ASSESSMENT NOTE - NS ED BHA DEMOGRAPHICS MEDICAL RECORD REVIEWED CONSENT OBTAINED YN
You can access the FollowMyHealth Patient Portal offered by [facility] by registering at the following website: [email or web address]. By joining [facility]’s FollowMyHealth portal, you will also be able to view your health information using other applications (apps) compatible with our system.
Yes

## 2022-05-27 NOTE — STROKE CODE NOTE - NS AS STROKE CODE PATIENT KNOWNTIME
Called pt to get further information, no soon appts available, if poss pt could see her pcp or be seen at urgent care.
Patient complaint of drainage from both ears with pain in right ear. Patient having hard time with hearing aids. Requesting for appointment as soon as possible. Please advise.
Known

## 2023-03-30 NOTE — BEHAVIORAL HEALTH ASSESSMENT NOTE - ORIENTED TO PLACE
Relation Age of Onset    Colon Cancer Mother        PHYSICAL EXAM:      BP (!) 152/84   Pulse 90   Temp 97.2 °F (36.2 °C) (Temporal)   Resp 20   Ht 5' 10.5\" (1.791 m)   Wt 183 lb (83 kg)   SpO2 98%   BMI 25.89 kg/m²  I        Heart:  RRR,  Normal S1S2    Lungs:  CTA Bilat, normal effort    Abdomen:  ND NT      ASA Grade:  ASA 3 - Patient with moderate systemic disease with functional limitations    Mallampati Class:  Class I: Soft palate, uvula, fauces, pillars visible  __________  Class II: Soft palate, uvula, fauces visible  __________   Class III: Soft palate, base of uvula visible  ____X_____  Class IV: Hard palate only visible   __________      ASSESSMENT AND PLAN:    1. Patient is a 79 y.o. male here for EGD with anesthesia  2. Procedure options, risks and benefits reviewed with patient. Patient expresses understanding.      Kita Ware, [address] [place] [address]  3/30/2023
Yes

## 2023-04-03 NOTE — H&P ADULT - NSHPLANGLIMITEDENGLISH_GEN_A_CORE
Use of antibiotic as directed.  New toothbrush in 2-3 days.  On antibiotic for 24 hours before no longer considered contagious.  If you do not receive a phone call than your strep test is negative and you can choose to discontinue the antibiotic if he would like.   
No

## 2025-07-10 NOTE — ED ADULT NURSE NOTE - NS ED NOTE  TALK SOMEONE YN
No pt complaining of chest pain and shortness of breath s/p injury playing basketball. Pt reports getting shoulder in center of chest and heard a " crack"